# Patient Record
Sex: MALE | Race: BLACK OR AFRICAN AMERICAN | Employment: OTHER | URBAN - METROPOLITAN AREA
[De-identification: names, ages, dates, MRNs, and addresses within clinical notes are randomized per-mention and may not be internally consistent; named-entity substitution may affect disease eponyms.]

---

## 2022-11-27 ENCOUNTER — APPOINTMENT (OUTPATIENT)
Dept: CT IMAGING | Age: 77
DRG: 100 | End: 2022-11-27
Attending: EMERGENCY MEDICINE
Payer: MEDICARE

## 2022-11-27 ENCOUNTER — HOSPITAL ENCOUNTER (INPATIENT)
Age: 77
LOS: 2 days | Discharge: HOME HEALTH | DRG: 100 | End: 2022-11-29
Attending: EMERGENCY MEDICINE | Admitting: GENERAL ACUTE CARE HOSPITAL
Payer: MEDICARE

## 2022-11-27 ENCOUNTER — APPOINTMENT (OUTPATIENT)
Dept: GENERAL RADIOLOGY | Age: 77
DRG: 100 | End: 2022-11-27
Attending: EMERGENCY MEDICINE
Payer: MEDICARE

## 2022-11-27 DIAGNOSIS — G40.901 STATUS EPILEPTICUS (HCC): Primary | ICD-10-CM

## 2022-11-27 DIAGNOSIS — G40.919 BREAKTHROUGH SEIZURE (HCC): ICD-10-CM

## 2022-11-27 DIAGNOSIS — F03.C0 SEVERE DEMENTIA WITHOUT BEHAVIORAL DISTURBANCE, PSYCHOTIC DISTURBANCE, MOOD DISTURBANCE, OR ANXIETY, UNSPECIFIED DEMENTIA TYPE: ICD-10-CM

## 2022-11-27 DIAGNOSIS — I67.9 CEREBRAL VASCULAR DISEASE: ICD-10-CM

## 2022-11-27 LAB
ALBUMIN SERPL-MCNC: 2.7 G/DL (ref 3.5–5)
ALBUMIN/GLOB SERPL: 0.5 {RATIO} (ref 1.1–2.2)
ALP SERPL-CCNC: 156 U/L (ref 45–117)
ALT SERPL-CCNC: 17 U/L (ref 12–78)
ANION GAP SERPL CALC-SCNC: 9 MMOL/L (ref 5–15)
APPEARANCE UR: ABNORMAL
AST SERPL-CCNC: 9 U/L (ref 15–37)
BACTERIA URNS QL MICRO: ABNORMAL /HPF
BASE DEFICIT BLD-SCNC: 6 MMOL/L
BASOPHILS # BLD: 0.1 K/UL (ref 0–0.1)
BASOPHILS NFR BLD: 1 % (ref 0–1)
BILIRUB SERPL-MCNC: 0.6 MG/DL (ref 0.2–1)
BILIRUB UR QL: NEGATIVE
BUN SERPL-MCNC: 20 MG/DL (ref 6–20)
BUN/CREAT SERPL: 10 (ref 12–20)
CA-I BLD-MCNC: 1.28 MMOL/L (ref 1.12–1.32)
CALCIUM SERPL-MCNC: 9 MG/DL (ref 8.5–10.1)
CHLORIDE BLD-SCNC: 103 MMOL/L (ref 100–108)
CHLORIDE SERPL-SCNC: 103 MMOL/L (ref 97–108)
CO2 BLD-SCNC: 20 MMOL/L (ref 19–24)
CO2 SERPL-SCNC: 22 MMOL/L (ref 21–32)
COLOR UR: ABNORMAL
COMMENT, HOLDF: NORMAL
CREAT SERPL-MCNC: 2.07 MG/DL (ref 0.7–1.3)
CREAT UR-MCNC: 2.1 MG/DL (ref 0.6–1.3)
DIFFERENTIAL METHOD BLD: ABNORMAL
EOSINOPHIL # BLD: 0.4 K/UL (ref 0–0.4)
EOSINOPHIL NFR BLD: 5 % (ref 0–7)
EPITH CASTS URNS QL MICRO: ABNORMAL /LPF
ERYTHROCYTE [DISTWIDTH] IN BLOOD BY AUTOMATED COUNT: 14.7 % (ref 11.5–14.5)
GLOBULIN SER CALC-MCNC: 5.9 G/DL (ref 2–4)
GLUCOSE BLD STRIP.AUTO-MCNC: 91 MG/DL (ref 74–106)
GLUCOSE SERPL-MCNC: 93 MG/DL (ref 65–100)
GLUCOSE UR STRIP.AUTO-MCNC: NEGATIVE MG/DL
HCO3 BLDA-SCNC: 20 MMOL/L
HCT VFR BLD AUTO: 32.7 % (ref 36.6–50.3)
HGB BLD-MCNC: 10.6 G/DL (ref 12.1–17)
HGB UR QL STRIP: ABNORMAL
IMM GRANULOCYTES # BLD AUTO: 0 K/UL (ref 0–0.04)
IMM GRANULOCYTES NFR BLD AUTO: 0 % (ref 0–0.5)
KETONES UR QL STRIP.AUTO: ABNORMAL MG/DL
LACTATE BLD-SCNC: 0.89 MMOL/L (ref 0.4–2)
LACTATE BLD-SCNC: 10.5 MMOL/L (ref 0.4–2)
LEUKOCYTE ESTERASE UR QL STRIP.AUTO: ABNORMAL
LYMPHOCYTES # BLD: 2.2 K/UL (ref 0.8–3.5)
LYMPHOCYTES NFR BLD: 27 % (ref 12–49)
MCH RBC QN AUTO: 32.1 PG (ref 26–34)
MCHC RBC AUTO-ENTMCNC: 32.4 G/DL (ref 30–36.5)
MCV RBC AUTO: 99.1 FL (ref 80–99)
MONOCYTES # BLD: 0.6 K/UL (ref 0–1)
MONOCYTES NFR BLD: 7 % (ref 5–13)
NEUTS SEG # BLD: 4.9 K/UL (ref 1.8–8)
NEUTS SEG NFR BLD: 60 % (ref 32–75)
NITRITE UR QL STRIP.AUTO: POSITIVE
NRBC # BLD: 0 K/UL (ref 0–0.01)
NRBC BLD-RTO: 0 PER 100 WBC
PCO2 BLDV: 42.5 MMHG (ref 41–51)
PH BLDV: 7.29 [PH] (ref 7.32–7.42)
PH UR STRIP: 5.5 [PH] (ref 5–8)
PLATELET # BLD AUTO: 280 K/UL (ref 150–400)
PMV BLD AUTO: 11.4 FL (ref 8.9–12.9)
PO2 BLDV: 26 MMHG (ref 25–40)
POTASSIUM BLD-SCNC: 5.7 MMOL/L (ref 3.5–5.5)
POTASSIUM SERPL-SCNC: 5.1 MMOL/L (ref 3.5–5.1)
PROT SERPL-MCNC: 8.6 G/DL (ref 6.4–8.2)
PROT UR STRIP-MCNC: 100 MG/DL
RBC # BLD AUTO: 3.3 M/UL (ref 4.1–5.7)
RBC #/AREA URNS HPF: >100 /HPF (ref 0–5)
SAMPLES BEING HELD,HOLD: NORMAL
SERVICE CMNT-IMP: ABNORMAL
SODIUM BLD-SCNC: 137 MMOL/L (ref 136–145)
SODIUM SERPL-SCNC: 134 MMOL/L (ref 136–145)
SP GR UR REFRACTOMETRY: 1.02
SPECIMEN SITE: ABNORMAL
UROBILINOGEN UR QL STRIP.AUTO: 1 EU/DL (ref 0.2–1)
WBC # BLD AUTO: 8.1 K/UL (ref 4.1–11.1)
WBC URNS QL MICRO: >100 /HPF (ref 0–4)

## 2022-11-27 PROCEDURE — 74011250636 HC RX REV CODE- 250/636

## 2022-11-27 PROCEDURE — 70450 CT HEAD/BRAIN W/O DYE: CPT

## 2022-11-27 PROCEDURE — 93005 ELECTROCARDIOGRAM TRACING: CPT

## 2022-11-27 PROCEDURE — 85025 COMPLETE CBC W/AUTO DIFF WBC: CPT

## 2022-11-27 PROCEDURE — 74011000258 HC RX REV CODE- 258: Performed by: EMERGENCY MEDICINE

## 2022-11-27 PROCEDURE — 95706 EEG WO VID 2-12HR INTMT MNTR: CPT | Performed by: EMERGENCY MEDICINE

## 2022-11-27 PROCEDURE — 65270000046 HC RM TELEMETRY

## 2022-11-27 PROCEDURE — 83605 ASSAY OF LACTIC ACID: CPT

## 2022-11-27 PROCEDURE — 36415 COLL VENOUS BLD VENIPUNCTURE: CPT

## 2022-11-27 PROCEDURE — 74011250636 HC RX REV CODE- 250/636: Performed by: STUDENT IN AN ORGANIZED HEALTH CARE EDUCATION/TRAINING PROGRAM

## 2022-11-27 PROCEDURE — 71045 X-RAY EXAM CHEST 1 VIEW: CPT

## 2022-11-27 PROCEDURE — 96374 THER/PROPH/DIAG INJ IV PUSH: CPT

## 2022-11-27 PROCEDURE — 74011250636 HC RX REV CODE- 250/636: Performed by: EMERGENCY MEDICINE

## 2022-11-27 PROCEDURE — 80053 COMPREHEN METABOLIC PANEL: CPT

## 2022-11-27 PROCEDURE — 87040 BLOOD CULTURE FOR BACTERIA: CPT

## 2022-11-27 PROCEDURE — 81001 URINALYSIS AUTO W/SCOPE: CPT

## 2022-11-27 PROCEDURE — 74011000250 HC RX REV CODE- 250: Performed by: STUDENT IN AN ORGANIZED HEALTH CARE EDUCATION/TRAINING PROGRAM

## 2022-11-27 PROCEDURE — 82947 ASSAY GLUCOSE BLOOD QUANT: CPT

## 2022-11-27 PROCEDURE — 99285 EMERGENCY DEPT VISIT HI MDM: CPT

## 2022-11-27 PROCEDURE — C9113 INJ PANTOPRAZOLE SODIUM, VIA: HCPCS | Performed by: STUDENT IN AN ORGANIZED HEALTH CARE EDUCATION/TRAINING PROGRAM

## 2022-11-27 RX ORDER — LEVETIRACETAM 500 MG/5ML
1000 INJECTION, SOLUTION, CONCENTRATE INTRAVENOUS EVERY 12 HOURS
Status: DISCONTINUED | OUTPATIENT
Start: 2022-11-27 | End: 2022-11-27 | Stop reason: SDUPTHER

## 2022-11-27 RX ORDER — SODIUM CHLORIDE 9 MG/ML
75 INJECTION, SOLUTION INTRAVENOUS CONTINUOUS
Status: DISPENSED | OUTPATIENT
Start: 2022-11-27 | End: 2022-11-28

## 2022-11-27 RX ORDER — LORAZEPAM 2 MG/ML
INJECTION INTRAMUSCULAR
Status: COMPLETED
Start: 2022-11-27 | End: 2022-11-27

## 2022-11-27 RX ORDER — LEVETIRACETAM 500 MG/5ML
1000 INJECTION, SOLUTION, CONCENTRATE INTRAVENOUS EVERY 12 HOURS
Status: DISCONTINUED | OUTPATIENT
Start: 2022-11-28 | End: 2022-11-30 | Stop reason: HOSPADM

## 2022-11-27 RX ORDER — LEVETIRACETAM 500 MG/5ML
1000 INJECTION, SOLUTION, CONCENTRATE INTRAVENOUS
Status: COMPLETED | OUTPATIENT
Start: 2022-11-27 | End: 2022-11-27

## 2022-11-27 RX ORDER — IPRATROPIUM BROMIDE AND ALBUTEROL SULFATE 2.5; .5 MG/3ML; MG/3ML
3 SOLUTION RESPIRATORY (INHALATION)
Status: DISCONTINUED | OUTPATIENT
Start: 2022-11-27 | End: 2022-11-30 | Stop reason: HOSPADM

## 2022-11-27 RX ORDER — LORAZEPAM 2 MG/ML
2 CONCENTRATE ORAL
Status: DISCONTINUED | OUTPATIENT
Start: 2022-11-27 | End: 2022-11-30 | Stop reason: HOSPADM

## 2022-11-27 RX ORDER — MIDAZOLAM HYDROCHLORIDE 5 MG/ML
5 INJECTION INTRAMUSCULAR; INTRAVENOUS
Status: ACTIVE | OUTPATIENT
Start: 2022-11-27 | End: 2022-11-28

## 2022-11-27 RX ORDER — ACETAMINOPHEN 325 MG/1
650 TABLET ORAL
Status: DISCONTINUED | OUTPATIENT
Start: 2022-11-27 | End: 2022-11-30 | Stop reason: HOSPADM

## 2022-11-27 RX ORDER — MIDAZOLAM HYDROCHLORIDE 1 MG/ML
INJECTION, SOLUTION INTRAMUSCULAR; INTRAVENOUS
Status: DISCONTINUED
Start: 2022-11-27 | End: 2022-11-27 | Stop reason: WASHOUT

## 2022-11-27 RX ORDER — HYDRALAZINE HYDROCHLORIDE 20 MG/ML
10 INJECTION INTRAMUSCULAR; INTRAVENOUS
Status: DISCONTINUED | OUTPATIENT
Start: 2022-11-27 | End: 2022-11-30 | Stop reason: HOSPADM

## 2022-11-27 RX ORDER — MIDAZOLAM HYDROCHLORIDE 1 MG/ML
INJECTION, SOLUTION INTRAMUSCULAR; INTRAVENOUS
Status: COMPLETED
Start: 2022-11-27 | End: 2022-11-27

## 2022-11-27 RX ORDER — ONDANSETRON 2 MG/ML
4 INJECTION INTRAMUSCULAR; INTRAVENOUS
Status: DISCONTINUED | OUTPATIENT
Start: 2022-11-27 | End: 2022-11-30 | Stop reason: HOSPADM

## 2022-11-27 RX ADMIN — CEFTRIAXONE 1 G: 1 INJECTION, POWDER, FOR SOLUTION INTRAMUSCULAR; INTRAVENOUS at 17:00

## 2022-11-27 RX ADMIN — SODIUM CHLORIDE 75 ML/HR: 9 INJECTION, SOLUTION INTRAVENOUS at 21:03

## 2022-11-27 RX ADMIN — LORAZEPAM 2 MG: 2 INJECTION INTRAMUSCULAR; INTRAVENOUS at 14:09

## 2022-11-27 RX ADMIN — MIDAZOLAM 5 MG: 1 INJECTION INTRAMUSCULAR; INTRAVENOUS at 14:09

## 2022-11-27 RX ADMIN — SODIUM CHLORIDE 1000 ML: 9 INJECTION, SOLUTION INTRAVENOUS at 14:33

## 2022-11-27 RX ADMIN — SODIUM CHLORIDE 40 MG: 9 INJECTION, SOLUTION INTRAMUSCULAR; INTRAVENOUS; SUBCUTANEOUS at 21:27

## 2022-11-27 RX ADMIN — LEVETIRACETAM 1000 MG: 100 INJECTION INTRAVENOUS at 14:08

## 2022-11-27 NOTE — H&P
Hospitalist Admission Note    NAME: Maxx Pena   :  1945   MRN:  [id number]     Date/Time:  2022 5:17 PM    Patient PCP: No primary care provider on file.  ______________________________________________________________________  Given the patient's current clinical presentation, I have a high level of concern for decompensation if discharged from the emergency department. Complex decision making was performed, which includes reviewing the patient's available past medical records, laboratory results, and x-ray films. My assessment of this patient's clinical condition and my plan of care is as follows. Assessment / Plan:  Assessment:  -witnessed convulsive seizure  -Acute encephalopathy 2/2 above  -CKD  -Dehydration  -HX of seizure disorder, CVA with residual left-sided weakness, advanced dementia, HTN, HLD    Plan:    -Patient was admitted for further workup and treatment  -will admit to PCU  -Keppra 1000 mg b.i.d.  -Ativan p.r.n. For seizures  -p.r.n. Hydralazine for SBP greater than 170  -consulted Neurology appreciate recommendations  -Bedside rapid EEG completed by ICU negative for evidence of status epilepticus/seizures  -TSH/UA ordered  -seizure precautions  -frequent neuro checks  -aspiration precautions  -maintain euglycemia  -restart appropriate home medications  Code Status: Full code    DVT Prophylaxis: Heparin    -PT/OT/ST    -further recommendations pending clinical course    This note was dictated using dictation software. There may be some inadvertently misspelled words or grammatical errors. Subjective:   CHIEF COMPLAINT: seizure    HISTORY OF PRESENT ILLNESS:     Maxx Pena is a 76 y.o.  male who presents for seizures. Patient is bedbound at baseline secondary to a stroke in the past.  Wife at bedside. She reports that the patient was taken off Keppra 2 weeks ago and has ran out of Valproate.   The patient has not had seizures for years. The patient has not had seizures for years and therefore he was taken off Keppra per the wife by his PCP. Patient has recently moved down to this area from [place]. Patient with advanced dementia at baseline. No reported fever, chills, chest pain, shortness of breath, cough, abdominal pain, nausea, vomiting, diarrhea, dysuria, new focal neurological deficits, hearing changes, vision changes, changes to her normal bladder/bowel habits, lower extremity swelling, hematuria, hemoptysis, hematochezia, and melena. We were asked to admit for work up and evaluation of the above problems. Past Medical History:   Diagnosis Date    Cholesterolosis     Hypertension     Kidney disease     Seizure ([facility]     Stroke ([facility]         No past surgical history on file. Social History     Tobacco Use    Smoking status: Not on file    Smokeless tobacco: Not on file   Substance Use Topics    Alcohol use: Not on file        No family history on file. Not on File     Prior to Admission medications    Not on File       REVIEW OF SYSTEMS:     I am not able to complete the review of systems because: The patient is intubated and sedated    The patient has altered mental status due to his acute medical problems    The patient has baseline aphasia from prior stroke(s)    The patient has baseline dementia and is not reliable historian    The patient is in acute medical distress and unable to provide information           Total of 12 systems reviewed as follows:       POSITIVE= underlined text  Negative = text not     Review of system limited secondary to the patient's altered mental status    Objective:   VITALS:    Visit Vitals  BP 92/78   Pulse 91   Temp 98.6 °F (37 °C)   Resp 18   Ht 5' 7\" (1.702 m)   Wt 73.9 kg (163 lb)   SpO2 96%   BMI 25.53 kg/m²       PHYSICAL EXAM:    General:    Altered, no distress, appears stated age.      HEENT: Atraumatic, anicteric sclerae, pink conjunctivae     No oral ulcers, mucosa moist, throat clear, dentition fair  Neck:  Supple, symmetrical,  thyroid: non tender  Lungs:   Clear to auscultation bilaterally. No Wheezing or Rhonchi. No rales. Chest wall:  No tenderness  No Accessory muscle use. Heart:   Regular  rhythm,  No  murmur   No edema  Abdomen:   Soft, non-tender. Not distended. Bowel sounds normal  Extremities: No cyanosis. No clubbing,      Skin turgor normal, Capillary refill normal, Radial dial pulse 2+  Skin:     Not pale. Not Jaundiced  No rashes   Psych:  Unable to assess  Neurologic: No new focal neurodeficit. Left-sided weakness/baseline. Patient is altered at this time secondary to administered sedatives  _______________________________________________________________________  Care Plan discussed with:    Comments   Patient     Family      RN     Care Manager                    Consultant:      _______________________________________________________________________  Expected  Disposition:   Home with Family    HH/PT/OT/RN    SNF/LTC    KIRSTEN    ________________________________________________________________________  TOTAL TIME:  39 Minutes    Critical Care Provided     Minutes non procedure based      Comments     Reviewed previous records   >50% of visit spent in counseling and coordination of care  Discussion with patient and/or family and questions answered       ________________________________________________________________________  Signed: Robert Kaufman DO    Procedures: see electronic medical records for all procedures/Xrays and details which were not copied into this note but were reviewed prior to creation of Plan.     LAB DATA REVIEWED:    Recent Results (from the past 24 hour(s))   BLOOD GAS,CHEM8,LACTIC ACID POC    Collection Time: 11/27/22  2:16 PM   Result Value Ref Range    Calcium, ionized (POC) 1.28 1.12 - 1.32 mmol/L    BICARBONATE 20 mmol/L    Base deficit (POC) 6.0 mmol/L    Sample source VENOUS BLOOD      CO2, POC 20 19 - 24 MMOL/L    Sodium, POC 137 136 - 145 MMOL/L    Potassium, POC 5.7 (H) 3.5 - 5.5 MMOL/L    Chloride,  100 - 108 MMOL/L    Glucose, POC 91 74 - 106 MG/DL    Creatinine, POC 2.1 (H) 0.6 - 1.3 MG/DL    Lactic Acid (POC) 10.50 (HH) 0.40 - 2.00 mmol/L    Critical value read back FAMILIA     pH, venous (POC) 7.29 (L) 7.32 - 7.42      pCO2, venous (POC) 42.5 41 - 51 MMHG    pO2, venous (POC) 26 25 - 40 mmHg   CBC WITH AUTOMATED DIFF    Collection Time: 11/27/22  2:18 PM   Result Value Ref Range    WBC 8.1 4.1 - 11.1 K/uL    RBC 3.30 (L) 4.10 - 5.70 M/uL    HGB 10.6 (L) 12.1 - 17.0 g/dL    HCT 32.7 (L) 36.6 - 50.3 %    MCV 99.1 (H) 80.0 - 99.0 FL    MCH 32.1 26.0 - 34.0 PG    MCHC 32.4 30.0 - 36.5 g/dL    RDW 14.7 (H) 11.5 - 14.5 %    PLATELET 654 663 - 665 K/uL    MPV 11.4 8.9 - 12.9 FL    NRBC 0.0 0  WBC    ABSOLUTE NRBC 0.00 0.00 - 0.01 K/uL    NEUTROPHILS 60 32 - 75 %    LYMPHOCYTES 27 12 - 49 %    MONOCYTES 7 5 - 13 %    EOSINOPHILS 5 0 - 7 %    BASOPHILS 1 0 - 1 %    IMMATURE GRANULOCYTES 0 0.0 - 0.5 %    ABS. NEUTROPHILS 4.9 1.8 - 8.0 K/UL    ABS. LYMPHOCYTES 2.2 0.8 - 3.5 K/UL    ABS. MONOCYTES 0.6 0.0 - 1.0 K/UL    ABS. EOSINOPHILS 0.4 0.0 - 0.4 K/UL    ABS. BASOPHILS 0.1 0.0 - 0.1 K/UL    ABS. IMM. GRANS. 0.0 0.00 - 0.04 K/UL    DF AUTOMATED     METABOLIC PANEL, COMPREHENSIVE    Collection Time: 11/27/22  2:18 PM   Result Value Ref Range    Sodium 134 (L) 136 - 145 mmol/L    Potassium 5.1 3.5 - 5.1 mmol/L    Chloride 103 97 - 108 mmol/L    CO2 22 21 - 32 mmol/L    Anion gap 9 5 - 15 mmol/L    Glucose 93 65 - 100 mg/dL    BUN 20 6 - 20 MG/DL    Creatinine 2.07 (H) 0.70 - 1.30 MG/DL    BUN/Creatinine ratio 10 (L) 12 - 20      eGFR 32 (L) >60 ml/min/1.73m2    Calcium 9.0 8.5 - 10.1 MG/DL    Bilirubin, total 0.6 0.2 - 1.0 MG/DL    ALT (SGPT) 17 12 - 78 U/L    AST (SGOT) 9 (L) 15 - 37 U/L    Alk.  phosphatase 156 (H) 45 - 117 U/L    Protein, total 8.6 (H) 6.4 - 8.2 g/dL    Albumin 2.7 (L) 3.5 - 5.0 g/dL    Globulin 5.9 (H) 2.0 - 4.0 g/dL    A-G Ratio 0.5 (L) 1.1 - 2.2     SAMPLES BEING HELD    Collection Time: 11/27/22  2:18 PM   Result Value Ref Range    SAMPLES BEING HELD 1SST 1BLU 1GREEN 2RED     COMMENT        Add-on orders for these samples will be processed based on acceptable specimen integrity and analyte stability, which may vary by analyte.    URINALYSIS W/ RFLX MICROSCOPIC    Collection Time: 11/27/22  2:44 PM   Result Value Ref Range    Color YELLOW/STRAW      Appearance CLOUDY (A) CLEAR      Specific gravity 1.016      pH (UA) 5.5 5.0 - 8.0      Protein 100 (A) NEG mg/dL    Glucose Negative NEG mg/dL    Ketone TRACE (A) NEG mg/dL    Bilirubin Negative NEG      Blood LARGE (A) NEG      Urobilinogen 1.0 0.2 - 1.0 EU/dL    Nitrites Positive (A) NEG      Leukocyte Esterase LARGE (A) NEG      WBC >100 (H) 0 - 4 /hpf    RBC >100 (H) 0 - 5 /hpf    Epithelial cells FEW FEW /lpf    Bacteria 4+ (A) NEG /hpf   POC LACTIC ACID    Collection Time: 11/27/22  4:52 PM   Result Value Ref Range    Lactic Acid (POC) 0.89 0.40 - 2.00 mmol/L

## 2022-11-27 NOTE — CONSULTS
ICU Consult    Name: Eav Miller MRN: [id number]   : 1945 Hospital: [facility]   Date: 2022        IMPRESSION:     Uncontrolled seizures due to recent lack of antiseizure meds     Patient Active Problem List   Diagnosis Code    [facility] G40.919        RECOMMENDATIONS:   At this time, patient is protecting his airway and is hemodynamically stable, o2 sats are 100% on room air and is not actively seizing   Received loading dose of keppra  Will need more fluids as he is appearing dehydrated     Subjective: This patient has been seen and evaluated at the request of Dr. Gerardo Rose for ICU evaluation. Patient is a 76 y.o. male with h/o seizure disorder is on depakote and valproic acid at home for seizures, bedbound due to stroke in the past, fed pureed diet by his wife, was brought to ER with seizures  Patient's son and wife at bedside, He has missed his anti seizure meds as he just moved here from [place] and could no get the prescription filled  No fever or vomiting    No past medical history on file. No past surgical history on file. Prior to Admission medications    Not on File     Not on File   Social History     Tobacco Use    Smoking status: Not on file    Smokeless tobacco: Not on file   Substance Use Topics    Alcohol use: Not on file      No family history on file. Current Facility-Administered Medications   Medication Dose Route Frequency    midazolam (VERSED) injection 5 mg  5 mg IntraMUSCular NOW    sodium chloride 0.9 % bolus infusion 1,000 mL  1,000 mL IntraVENous ONCE    cefTRIAXone (ROCEPHIN) 1 g in 0.9% sodium chloride (MBP/ADV) 50 mL MBP  1 g IntraVENous Q24H       Review of Systems:  Pertinent items are noted in HPI.   ROS : unable to obtain    Objective:   Vital Signs:    Visit Vitals  BP 92/78   Pulse 91   Temp 98.6 °F (37 °C)   Resp 18   Ht 5' 7\" (1.702 m)   Wt 73.9 kg (163 lb)   SpO2 96%   BMI 25.53 kg/m²       O2 Device: None (Room air) Temp (24hrs), Av.6 °F (37 °C), Min:98.6 °F (37 °C), Max:98.6 °F (37 °C)       Intake/Output:   Last shift:      No intake/output data recorded. Last 3 shifts: No intake/output data recorded. No intake or output data in the 24 hours ending 22 1658     Physical Exam:   General:  Thin built, chronically ill appearing,emaciated   Head:  Normocephalic, without obvious abnormality, atraumatic. Eyes:  Conjunctivae/corneas clear. ANicteric   Nose: Nares normal. Mucosa normal. No drainage or sinus tenderness. Throat: Lips, mucosa dry. Neck: Supple, symmetrical, trachea midline, no adenopathy, thyroid: no enlargment/tenderness/nodules, no carotid bruit and no JVD. No crepitus   Back:   Symmetric, no curvature, no spine tenderness or flank pain   Lungs:   Bilateral auscultation - clear to auscultaion   Chest wall:  No tenderness or deformity. Heart:  Regular rate and rhythm, S1, S2 normal, no murmur, click, rub or gallop. Abdomen:   Soft, non-tender. Bowel sounds normal. No masses,  No organomegaly. No paradox   Extremities: normal, atraumatic, no cyanosis or edema. contracted   Pulses: 1-2+ and symmetric all extremities.    Skin: Skin color, texture, turgor normal. No rashes or lesions   Lymph nodes: Cervical, supraclavicular, and axillary nodes normal.   Neurologic: Drowsy, cannot perform proper neurologic exam, contracted          Data review:   Labs:  Recent Results (from the past 24 hour(s))   BLOOD GAS,CHEM8,LACTIC ACID POC    Collection Time: 22  2:16 PM   Result Value Ref Range    Calcium, ionized (POC) 1.28 1.12 - 1.32 mmol/L    BICARBONATE 20 mmol/L    Base deficit (POC) 6.0 mmol/L    Sample source VENOUS BLOOD      CO2, POC 20 19 - 24 MMOL/L    Sodium,  136 - 145 MMOL/L    Potassium, POC 5.7 (H) 3.5 - 5.5 MMOL/L    Chloride,  100 - 108 MMOL/L    Glucose, POC 91 74 - 106 MG/DL    Creatinine, POC 2.1 (H) 0.6 - 1.3 MG/DL    Lactic Acid (POC) 10.50 (HH) 0.40 - 2.00 mmol/L Critical value read back FAMILIA     pH, venous (POC) 7.29 (L) 7.32 - 7.42      pCO2, venous (POC) 42.5 41 - 51 MMHG    pO2, venous (POC) 26 25 - 40 mmHg   CBC WITH AUTOMATED DIFF    Collection Time: 11/27/22  2:18 PM   Result Value Ref Range    WBC 8.1 4.1 - 11.1 K/uL    RBC 3.30 (L) 4.10 - 5.70 M/uL    HGB 10.6 (L) 12.1 - 17.0 g/dL    HCT 32.7 (L) 36.6 - 50.3 %    MCV 99.1 (H) 80.0 - 99.0 FL    MCH 32.1 26.0 - 34.0 PG    MCHC 32.4 30.0 - 36.5 g/dL    RDW 14.7 (H) 11.5 - 14.5 %    PLATELET 045 490 - 671 K/uL    MPV 11.4 8.9 - 12.9 FL    NRBC 0.0 0  WBC    ABSOLUTE NRBC 0.00 0.00 - 0.01 K/uL    NEUTROPHILS 60 32 - 75 %    LYMPHOCYTES 27 12 - 49 %    MONOCYTES 7 5 - 13 %    EOSINOPHILS 5 0 - 7 %    BASOPHILS 1 0 - 1 %    IMMATURE GRANULOCYTES 0 0.0 - 0.5 %    ABS. NEUTROPHILS 4.9 1.8 - 8.0 K/UL    ABS. LYMPHOCYTES 2.2 0.8 - 3.5 K/UL    ABS. MONOCYTES 0.6 0.0 - 1.0 K/UL    ABS. EOSINOPHILS 0.4 0.0 - 0.4 K/UL    ABS. BASOPHILS 0.1 0.0 - 0.1 K/UL    ABS. IMM. GRANS. 0.0 0.00 - 0.04 K/UL    DF AUTOMATED     METABOLIC PANEL, COMPREHENSIVE    Collection Time: 11/27/22  2:18 PM   Result Value Ref Range    Sodium 134 (L) 136 - 145 mmol/L    Potassium 5.1 3.5 - 5.1 mmol/L    Chloride 103 97 - 108 mmol/L    CO2 22 21 - 32 mmol/L    Anion gap 9 5 - 15 mmol/L    Glucose 93 65 - 100 mg/dL    BUN 20 6 - 20 MG/DL    Creatinine 2.07 (H) 0.70 - 1.30 MG/DL    BUN/Creatinine ratio 10 (L) 12 - 20      eGFR 32 (L) >60 ml/min/1.73m2    Calcium 9.0 8.5 - 10.1 MG/DL    Bilirubin, total 0.6 0.2 - 1.0 MG/DL    ALT (SGPT) 17 12 - 78 U/L    AST (SGOT) 9 (L) 15 - 37 U/L    Alk.  phosphatase 156 (H) 45 - 117 U/L    Protein, total 8.6 (H) 6.4 - 8.2 g/dL    Albumin 2.7 (L) 3.5 - 5.0 g/dL    Globulin 5.9 (H) 2.0 - 4.0 g/dL    A-G Ratio 0.5 (L) 1.1 - 2.2     SAMPLES BEING HELD    Collection Time: 11/27/22  2:18 PM   Result Value Ref Range    SAMPLES BEING HELD 1SST 1BLU 1GREEN 2RED     COMMENT        Add-on orders for these samples will be processed based on acceptable specimen integrity and analyte stability, which may vary by analyte. URINALYSIS W/ RFLX MICROSCOPIC    Collection Time: 11/27/22  2:44 PM   Result Value Ref Range    Color YELLOW/STRAW      Appearance CLOUDY (A) CLEAR      Specific gravity 1.016      pH (UA) 5.5 5.0 - 8.0      Protein 100 (A) NEG mg/dL    Glucose Negative NEG mg/dL    Ketone TRACE (A) NEG mg/dL    Bilirubin Negative NEG      Blood LARGE (A) NEG      Urobilinogen 1.0 0.2 - 1.0 EU/dL    Nitrites Positive (A) NEG      Leukocyte Esterase LARGE (A) NEG      WBC >100 (H) 0 - 4 /hpf    RBC >100 (H) 0 - 5 /hpf    Epithelial cells FEW FEW /lpf    Bacteria 4+ (A) NEG /hpf       PFT Results  (Last 48 hours)      None              Imaging:  I have personally reviewed the patients radiographs and have reviewed the reports:  XR Results (most recent):  Results from Hospital Encounter encounter on 11/27/22    XR CHEST PORT    Narrative  EXAM:  XR CHEST PORT    INDICATION: Status epilepticus. COMPARISON: none    TECHNIQUE: Upright portable chest AP view    FINDINGS: Cardiac monitoring leads are noted. The cardiac silhouette is within  normal limits. The pulmonary vasculature is within normal limits. Atherosclerotic calcifications affect the aortic arch and the thoracic aorta is  tortuous. The lungs and pleural spaces are clear. The visualized bones and upper abdomen  are age-appropriate. Impression  No acute findings. CT Results (most recent):  Results from Hospital Encounter encounter on 11/27/22    CT HEAD WO CONT    Narrative  EXAM: Brain CT without contrast.    INDICATION: status epilepticus    TECHNIQUE: Noncontrast CT of the brain is performed with 5 mm collimation. Bone  algorithm axial and sagittal and coronal reconstructions performed and  evaluated.   CT dose reduction was achieved through use of a standardized  protocol tailored for this examination and automatic exposure control for dose  modulation. COMPARISON: None    FINDINGS: There is no acute intracranial hemorrhage, mass, mass effect or  herniation. Ventricles and sulci show proportionate and symmetric prominence. There is periventricular white matter hypodensity. The gray-white matter  differentiation is well-preserved. Atherosclerotic calcifications are seen  within the carotid siphons and vertebral arteries. The mastoid air cells are  well pneumatized. The visualized paranasal sinuses are normal.    Impression  No acute intracranial hemorrhage, mass or infarct. Cerebral atrophy  and white matter change most compatible with chronic small vessel ischemic  and/or senescent change. Intracranial atherosclerosis. High complexity decision making was performed during the evaluation of this patient at high risk for decompensation with multiple organ involvement     Above mentioned total time spent on reviewing the case/medical record/data/notes/EMR/patient examination/documentation/coordinating care with nurse/consultants, exclusive of procedures with complex decision making performed and > 50% time spent in face to face evaluation.      Jourdan Bryant MD

## 2022-11-27 NOTE — ED NOTES
EMERGENCY DEPARTMENT HISTORY AND PHYSICAL EXAM      MRM 2 PROGRESSIVE CARE: EMERGENCY DEPARTMENT ENCOUNTER     Pt Name: Prince Alfaro MRN: [id number]  Birthdate 1945  Date of evaluation: 11/27/2022  Provider: Dr. Tenorio People   Attending: Jose Rafael Larkin MD   PCP: Nate Carmen MD  Note Started: 2:33 PM     [address]   Chief Complaint   Patient presents with    Seizure     Pt was having seizure activity        History Provided By: Patient    HISTORY OF PRESENT ILLNESS  (Location, Timing/Onset, Context/Setting, Quality, Duration, Modifying Factors, Severity, Associated Signs and Symptoms) Note limiting factors. Chief Complaint: seizure   Prince Alfaro is a 76 y.o. male with PMH CVA with residual left sided weakness, HTN, seizures, type II DM who presents by EMS with convulsions and unresponsiveness. EMS reports he has had 3 prior episodes today, reported to last 1-2 minutes. States current episode has been ongoing for 45 minutes. Patient is breathing on his own. Family reports history of seizures for which patient has been on Keppra and Depakote outpatient, but these medications were discontinued two weeks ago by his PCP. Patient was recently in a rehab facility in [place] and recently came to the area. Has baseline dementia and history of CVA. He is non-ambulatory. Wife reports he has not had a seizure in many years. Nursing Notes were all reviewed and agreed with or any disagreements were addressed in the HPI. REVIEW OF SYSTEMS   (2-9 systems for level 4, 10 or more for level 5)  Review of Systems   Constitutional:  Negative for chills and fever. HENT:  Negative for drooling. Eyes:  Negative for discharge and visual disturbance. Gastrointestinal:  Negative for vomiting. Musculoskeletal:  Positive for gait problem. Neurological:  Positive for tremors and seizures. Negative for syncope. Positives and Pertinent negatives as per HPI.  Except as noted above in the ROS, all other systems were reviewed and negative. PAST MEDICAL HISTORY  Past Medical History:   Diagnosis Date    Cholesterolosis     Hypertension     Kidney disease     Seizure ([facility] [address]     Stroke [facility])        SURGICAL HISTORY  No past surgical history on file. CURRENTMEDICATIONS   There are no discharge medications for this patient. ALLERGIES   Patient has no known allergies. FAMILYHISTORY   No family history on file. SOCIAL HISTORY        PHYSICAL EXAM   (up to 7 for level 4, 8 or more for level 5)  Patient Vitals for the past 12 hrs:   Temp Pulse Resp BP SpO2   11/27/22 2107 97.9 °F (36.6 °C) 83 13 (!) 154/81 100 %   11/27/22 2015 -- 75 11 (!) 170/92 --   11/27/22 1830 -- 81 14 (!) 174/94 --   11/27/22 1800 -- 74 12 (!) 175/86 --   11/27/22 1724 -- 78 16 (!) 167/88 100 %   11/27/22 1447 -- 91 -- -- --   11/27/22 1446 98.6 °F (37 °C) -- 18 92/78 96 %      Physical Exam  HENT:      Head: Normocephalic and atraumatic. Cardiovascular:      Rate and Rhythm: Normal rate. Pulmonary:      Effort: Pulmonary effort is normal.   Skin:     General: Skin is warm and dry. Neurological:      Mental Status: He is disoriented and unresponsive. Motor: Tremor and seizure activity present.         DIAGNOSTIC RESULTS  LABS:  Recent Results (from the past 12 hour(s))   BLOOD GAS,CHEM8,LACTIC ACID POC    Collection Time: 11/27/22  2:16 PM   Result Value Ref Range    Calcium, ionized (POC) 1.28 1.12 - 1.32 mmol/L    BICARBONATE 20 mmol/L    Base deficit (POC) 6.0 mmol/L    Sample source VENOUS BLOOD      CO2, POC 20 19 - 24 MMOL/L    Sodium,  136 - 145 MMOL/L    Potassium, POC 5.7 (H) 3.5 - 5.5 MMOL/L    Chloride,  100 - 108 MMOL/L    Glucose, POC 91 74 - 106 MG/DL    Creatinine, POC 2.1 (H) 0.6 - 1.3 MG/DL    Lactic Acid (POC) 10.50 (HH) 0.40 - 2.00 mmol/L    Critical value read back FAMILIA     pH, venous (POC) 7.29 (L) 7.32 - 7.42      pCO2, venous (POC) 42.5 41 - 51 MMHG    pO2, venous (POC) 26 25 - 40 mmHg   CBC WITH AUTOMATED DIFF    Collection Time: 11/27/22  2:18 PM   Result Value Ref Range    WBC 8.1 4.1 - 11.1 K/uL    RBC 3.30 (L) 4.10 - 5.70 M/uL    HGB 10.6 (L) 12.1 - 17.0 g/dL    HCT 32.7 (L) 36.6 - 50.3 %    MCV 99.1 (H) 80.0 - 99.0 FL    MCH 32.1 26.0 - 34.0 PG    MCHC 32.4 30.0 - 36.5 g/dL    RDW 14.7 (H) 11.5 - 14.5 %    PLATELET 503 114 - 633 K/uL    MPV 11.4 8.9 - 12.9 FL    NRBC 0.0 0  WBC    ABSOLUTE NRBC 0.00 0.00 - 0.01 K/uL    NEUTROPHILS 60 32 - 75 %    LYMPHOCYTES 27 12 - 49 %    MONOCYTES 7 5 - 13 %    EOSINOPHILS 5 0 - 7 %    BASOPHILS 1 0 - 1 %    IMMATURE GRANULOCYTES 0 0.0 - 0.5 %    ABS. NEUTROPHILS 4.9 1.8 - 8.0 K/UL    ABS. LYMPHOCYTES 2.2 0.8 - 3.5 K/UL    ABS. MONOCYTES 0.6 0.0 - 1.0 K/UL    ABS. EOSINOPHILS 0.4 0.0 - 0.4 K/UL    ABS. BASOPHILS 0.1 0.0 - 0.1 K/UL    ABS. IMM. GRANS. 0.0 0.00 - 0.04 K/UL    DF AUTOMATED     METABOLIC PANEL, COMPREHENSIVE    Collection Time: 11/27/22  2:18 PM   Result Value Ref Range    Sodium 134 (L) 136 - 145 mmol/L    Potassium 5.1 3.5 - 5.1 mmol/L    Chloride 103 97 - 108 mmol/L    CO2 22 21 - 32 mmol/L    Anion gap 9 5 - 15 mmol/L    Glucose 93 65 - 100 mg/dL    BUN 20 6 - 20 MG/DL    Creatinine 2.07 (H) 0.70 - 1.30 MG/DL    BUN/Creatinine ratio 10 (L) 12 - 20      eGFR 32 (L) >60 ml/min/1.73m2    Calcium 9.0 8.5 - 10.1 MG/DL    Bilirubin, total 0.6 0.2 - 1.0 MG/DL    ALT (SGPT) 17 12 - 78 U/L    AST (SGOT) 9 (L) 15 - 37 U/L    Alk. phosphatase 156 (H) 45 - 117 U/L    Protein, total 8.6 (H) 6.4 - 8.2 g/dL    Albumin 2.7 (L) 3.5 - 5.0 g/dL    Globulin 5.9 (H) 2.0 - 4.0 g/dL    A-G Ratio 0.5 (L) 1.1 - 2.2     SAMPLES BEING HELD    Collection Time: 11/27/22  2:18 PM   Result Value Ref Range    SAMPLES BEING HELD 1SST 1BLU 1GREEN 2RED     COMMENT        Add-on orders for these samples will be processed based on acceptable specimen integrity and analyte stability, which may vary by analyte.    URINALYSIS W/ RFLX MICROSCOPIC    Collection Time: 11/27/22  2:44 PM   Result Value Ref Range    Color YELLOW/STRAW      Appearance CLOUDY (A) CLEAR      Specific gravity 1.016      pH (UA) 5.5 5.0 - 8.0      Protein 100 (A) NEG mg/dL    Glucose Negative NEG mg/dL    Ketone TRACE (A) NEG mg/dL    Bilirubin Negative NEG      Blood LARGE (A) NEG      Urobilinogen 1.0 0.2 - 1.0 EU/dL    Nitrites Positive (A) NEG      Leukocyte Esterase LARGE (A) NEG      WBC >100 (H) 0 - 4 /hpf    RBC >100 (H) 0 - 5 /hpf    Epithelial cells FEW FEW /lpf    Bacteria 4+ (A) NEG /hpf   POC LACTIC ACID    Collection Time: 11/27/22  4:52 PM   Result Value Ref Range    Lactic Acid (POC) 0.89 0.40 - 2.00 mmol/L        EKG: When ordered, EKG's are interpreted by the Emergency Department Physician in the absence of a cardiologist.  Please see their note for interpretation of EKG. RADIOLOGY: All images such as plain films, CT, Ultrasound and MRI are read by the radiologist. Interpretation per the Radiologist below, if available at the time of this note:  CT HEAD WO CONT    Result Date: 11/27/2022  EXAM: Brain CT without contrast. INDICATION: status epilepticus TECHNIQUE: Noncontrast CT of the brain is performed with 5 mm collimation. Bone algorithm axial and sagittal and coronal reconstructions performed and evaluated. CT dose reduction was achieved through use of a standardized protocol tailored for this examination and automatic exposure control for dose modulation. COMPARISON: None FINDINGS: There is no acute intracranial hemorrhage, mass, mass effect or herniation. Ventricles and sulci show proportionate and symmetric prominence. There is periventricular white matter hypodensity. The gray-white matter differentiation is well-preserved. Atherosclerotic calcifications are seen within the carotid siphons and vertebral arteries. The mastoid air cells are well pneumatized.  The visualized paranasal sinuses are normal.     No acute intracranial hemorrhage, mass or infarct. Cerebral atrophy and white matter change most compatible with chronic small vessel ischemic and/or senescent change. Intracranial atherosclerosis. XR CHEST PORT    Result Date: 11/27/2022  EXAM:  XR CHEST PORT INDICATION: Status epilepticus. COMPARISON: none TECHNIQUE: Upright portable chest AP view FINDINGS: Cardiac monitoring leads are noted. The cardiac silhouette is within normal limits. The pulmonary vasculature is within normal limits. Atherosclerotic calcifications affect the aortic arch and the thoracic aorta is tortuous. The lungs and pleural spaces are clear. The visualized bones and upper abdomen are age-appropriate. No acute findings. PROCEDURES - Unless otherwise noted below, none  Procedures    CRITICAL CARE TIME: I have spent 35 minutes of critical care time in evaluating and treating this patient. This includes time spent at bedside, time with family and decision makers, documentation, review of labs and imaging, and/or consultation with specialists. It does not include time spent on separately billed procedures. This patient presents with a critical illness or injury that acutely impairs one or more vital organ systems such that there is a high probability of imminent or life threatening deterioration in the patient's condition. This case involved decision making of high complexity to assess, manipulate, and support vital organ system failure and/or to prevent further life threatening deterioration of the patient's condition. Failure to initiate these interventions on an urgent basis would likely result in sudden, clinically significant or life threatening deterioration in the patient's condition.     Abnormal findings supporting critical care: Status epilepticus  Interventions to support critical care: Multiple rounds of IV antiepileptic drugs  Failure to intervene may result in: Neurologic deterioration      CONSULTS:  IP CONSULT TO HOSPITALIST  IP CONSULT TO NEUROLOGY    DIFFERENTIAL DIAGNOSIS/MDM:  I reviewed the vital signs, available nursing notes, past medical history, past surgical history, family history and social history. Records Reviewed: Nursing Notes    Provider Notes (Medical Decision Making):   Patient presents to ED in status epilepticus. Received ativan, versed, keppra with resolution of seizure after approx. 10 min, total seizure time approx 1 hr. Lactic acid elevated to 10.5 initially. Improved to 0.89. CXR negative. CT head No acute intracranial hemorrhage, mass or infarct. Cerebral atrophy and white matter change most compatible with chronic small vessel ischemic and/or senescent changes. UTI on UA. Patient started on Rocephin. Admitted to medicine for further evaluation and management. EMERGENCY DEPARTMENT COURSE:   I am the first provider for this patient. Initial assessment performed. The patients presenting problems have been discussed, and they are in agreement with the care plan formulated and outlined with them. I have encouraged them to ask questions as they arise throughout their visit. ED Course as of 11/27/22 2225   Sun Nov 27, 2022   1422 I was notified by nursing of patient's critical lactate of 10.4. I will relay this to physician of record, Dr. Dariana Brito. I believe this is secondary to ongoing seizure activity, not sepsis. [MB]   1649 Patient's lactic acidosis is due to seizures and not to sepsis.  [CC]   3799 Patient seen by the intensive care provider [CC]   0827 Violent Restraint Face-to-Face Evaluation  (must be completed within one hour of initiation of restraints)      Evaluate immediate situation: Patient pulling off EEG device and pulling off lines    Reaction to intervention: Unable to cooperate with verbal or tactile stimuli    Medical Condition/Assessment: Status epilepticus    Behavioral Condition/Assessment: Status epilepticus, acute cephalopathy    The patient's review of systems, history, medications, and recent labs were reviewed at this time.      Continue/Discontinue restraints at this time: Continue    One-Hour Review Evaluation Physician Notification:    Provider Notified (Name): Dr. Murphy Bucio     date 2022     time [address]    Physician or Designated One-Hour Reviewer: Vania Pierce MD                      [CC]      ED Course User Index  [CC] Cheral Simmonds, MD  [MB] Juanpablo Lord MD       Vitals:   Patient Vitals for the past 12 hrs:   Temp Pulse Resp BP SpO2   22 97.9 °F (36.6 °C) 83 13 (!) 154/81 100 %   22 2015 -- 75 11 (!) 170/92 --   22 1830 -- 81 14 (!) 174/94 --   22 1800 -- 74 12 (!) 175/86 --   22 1724 -- 78 16 (!) 167/88 100 %   22 1447 -- 91 -- -- --   22 1446 98.6 °F (37 °C) -- 18 92/78 96 %        Patient was given the following medications:  Medications   midazolam (VERSED) injection 5 mg (has no administration in time range)   cefTRIAXone (ROCEPHIN) 1 g in 0.9% sodium chloride (MBP/ADV) 50 mL MBP (1 g IntraVENous New Bag 22)   acetaminophen (TYLENOL) tablet 650 mg (has no administration in time range)   ondansetron (ZOFRAN) injection 4 mg (has no administration in time range)   pantoprazole (PROTONIX) 40 mg in 0.9% sodium chloride 10 mL injection (40 mg IntraVENous Given 22)   albuterol-ipratropium (DUO-NEB) 2.5 MG-0.5 MG/3 ML (has no administration in time range)   hydrALAZINE (APRESOLINE) 20 mg/mL injection 10 mg (has no administration in time range)   LORazepam (INTENSOL) 2 mg/mL oral concentrate 2 mg (has no administration in time range)   0.9% sodium chloride infusion (75 mL/hr IntraVENous New Bag 22)   levETIRAcetam (KEPPRA) injection 1,000 mg (has no administration in time range)   levETIRAcetam (KEPPRA) injection 1,000 mg (1,000 mg IntraVENous Given 22 1408)   LORazepam (ATIVAN) 2 mg/mL injection (2 mg  Given 22 1409)   midazolam (VERSED) 1 mg/mL injection (5 mg  Given 22 1409)   sodium chloride 0.9 % bolus infusion 1,000 mL (1,000 mL IntraVENous New Bag 11/27/22 1433)       Admit Note:  Pt is being admitted by Dr. Digna Young. The results of their tests and reason(s) for their admission have been discussed with pt and/or available family. They convey agreement and understanding for the need to be admitted and for admission diagnosis. FINAL IMPRESSION  1. Status epilepticus ([address]           Pt seen and note written by Ember OSMAN, in conjunction with this provider. Dr. Mark Perdue         Please note that this dictation was completed with [employer], the computer voice recognition software. Quite often unanticipated grammatical, syntax, homophones, and other interpretive errors are inadvertently transcribed by the computer software. Please disregards these errors. Please excuse any errors that have escaped final proofreading.

## 2022-11-28 ENCOUNTER — APPOINTMENT (OUTPATIENT)
Dept: MRI IMAGING | Age: 77
DRG: 100 | End: 2022-11-28
Attending: PSYCHIATRY & NEUROLOGY
Payer: MEDICARE

## 2022-11-28 LAB
ALBUMIN SERPL-MCNC: 2.3 G/DL (ref 3.5–5)
ALBUMIN/GLOB SERPL: 0.5 {RATIO} (ref 1.1–2.2)
ALP SERPL-CCNC: 131 U/L (ref 45–117)
ALT SERPL-CCNC: 12 U/L (ref 12–78)
ANION GAP SERPL CALC-SCNC: 5 MMOL/L (ref 5–15)
AST SERPL-CCNC: 9 U/L (ref 15–37)
ATRIAL RATE: 112 BPM
ATRIAL RATE: 70 BPM
BASOPHILS # BLD: 0.1 K/UL (ref 0–0.1)
BASOPHILS NFR BLD: 1 % (ref 0–1)
BILIRUB SERPL-MCNC: 0.9 MG/DL (ref 0.2–1)
BUN SERPL-MCNC: 18 MG/DL (ref 6–20)
BUN/CREAT SERPL: 11 (ref 12–20)
CALCIUM SERPL-MCNC: 8.6 MG/DL (ref 8.5–10.1)
CALCULATED P AXIS, ECG09: 40 DEGREES
CALCULATED P AXIS, ECG09: 53 DEGREES
CALCULATED R AXIS, ECG10: 0 DEGREES
CALCULATED R AXIS, ECG10: 23 DEGREES
CALCULATED T AXIS, ECG11: 38 DEGREES
CALCULATED T AXIS, ECG11: 51 DEGREES
CHLORIDE SERPL-SCNC: 109 MMOL/L (ref 97–108)
CO2 SERPL-SCNC: 23 MMOL/L (ref 21–32)
CREAT SERPL-MCNC: 1.67 MG/DL (ref 0.7–1.3)
DIAGNOSIS, 93000: NORMAL
DIAGNOSIS, 93000: NORMAL
DIFFERENTIAL METHOD BLD: ABNORMAL
EOSINOPHIL # BLD: 0.5 K/UL (ref 0–0.4)
EOSINOPHIL NFR BLD: 6 % (ref 0–7)
ERYTHROCYTE [DISTWIDTH] IN BLOOD BY AUTOMATED COUNT: 14.6 % (ref 11.5–14.5)
GLOBULIN SER CALC-MCNC: 5.1 G/DL (ref 2–4)
GLUCOSE SERPL-MCNC: 98 MG/DL (ref 65–100)
HCT VFR BLD AUTO: 28.6 % (ref 36.6–50.3)
HGB BLD-MCNC: 9.4 G/DL (ref 12.1–17)
IMM GRANULOCYTES # BLD AUTO: 0 K/UL (ref 0–0.04)
IMM GRANULOCYTES NFR BLD AUTO: 0 % (ref 0–0.5)
LYMPHOCYTES # BLD: 1.4 K/UL (ref 0.8–3.5)
LYMPHOCYTES NFR BLD: 17 % (ref 12–49)
MCH RBC QN AUTO: 32.4 PG (ref 26–34)
MCHC RBC AUTO-ENTMCNC: 32.9 G/DL (ref 30–36.5)
MCV RBC AUTO: 98.6 FL (ref 80–99)
MONOCYTES # BLD: 0.5 K/UL (ref 0–1)
MONOCYTES NFR BLD: 6 % (ref 5–13)
NEUTS SEG # BLD: 5.6 K/UL (ref 1.8–8)
NEUTS SEG NFR BLD: 70 % (ref 32–75)
NRBC # BLD: 0 K/UL (ref 0–0.01)
NRBC BLD-RTO: 0 PER 100 WBC
P-R INTERVAL, ECG05: 174 MS
P-R INTERVAL, ECG05: 182 MS
PLATELET # BLD AUTO: 205 K/UL (ref 150–400)
PMV BLD AUTO: 11.5 FL (ref 8.9–12.9)
POTASSIUM SERPL-SCNC: 4.4 MMOL/L (ref 3.5–5.1)
PROT SERPL-MCNC: 7.4 G/DL (ref 6.4–8.2)
Q-T INTERVAL, ECG07: 324 MS
Q-T INTERVAL, ECG07: 358 MS
QRS DURATION, ECG06: 72 MS
QRS DURATION, ECG06: 72 MS
QTC CALCULATION (BEZET), ECG08: 386 MS
QTC CALCULATION (BEZET), ECG08: 442 MS
RBC # BLD AUTO: 2.9 M/UL (ref 4.1–5.7)
SODIUM SERPL-SCNC: 137 MMOL/L (ref 136–145)
TSH SERPL DL<=0.05 MIU/L-ACNC: 1.62 UIU/ML (ref 0.36–3.74)
VENTRICULAR RATE, ECG03: 112 BPM
VENTRICULAR RATE, ECG03: 70 BPM
WBC # BLD AUTO: 8 K/UL (ref 4.1–11.1)

## 2022-11-28 PROCEDURE — 97110 THERAPEUTIC EXERCISES: CPT | Performed by: PHYSICAL THERAPIST

## 2022-11-28 PROCEDURE — 93005 ELECTROCARDIOGRAM TRACING: CPT

## 2022-11-28 PROCEDURE — 97165 OT EVAL LOW COMPLEX 30 MIN: CPT | Performed by: OCCUPATIONAL THERAPIST

## 2022-11-28 PROCEDURE — 36415 COLL VENOUS BLD VENIPUNCTURE: CPT

## 2022-11-28 PROCEDURE — 97530 THERAPEUTIC ACTIVITIES: CPT | Performed by: PHYSICAL THERAPIST

## 2022-11-28 PROCEDURE — 65270000046 HC RM TELEMETRY

## 2022-11-28 PROCEDURE — 70551 MRI BRAIN STEM W/O DYE: CPT

## 2022-11-28 PROCEDURE — 97110 THERAPEUTIC EXERCISES: CPT | Performed by: OCCUPATIONAL THERAPIST

## 2022-11-28 PROCEDURE — 77010033678 HC OXYGEN DAILY

## 2022-11-28 PROCEDURE — 80053 COMPREHEN METABOLIC PANEL: CPT

## 2022-11-28 PROCEDURE — C9113 INJ PANTOPRAZOLE SODIUM, VIA: HCPCS | Performed by: STUDENT IN AN ORGANIZED HEALTH CARE EDUCATION/TRAINING PROGRAM

## 2022-11-28 PROCEDURE — 74011000250 HC RX REV CODE- 250: Performed by: STUDENT IN AN ORGANIZED HEALTH CARE EDUCATION/TRAINING PROGRAM

## 2022-11-28 PROCEDURE — 84443 ASSAY THYROID STIM HORMONE: CPT

## 2022-11-28 PROCEDURE — 95816 EEG AWAKE AND DROWSY: CPT | Performed by: PSYCHIATRY & NEUROLOGY

## 2022-11-28 PROCEDURE — 95717 EEG PHYS/QHP 2-12 HR W/O VID: CPT | Performed by: PSYCHIATRY & NEUROLOGY

## 2022-11-28 PROCEDURE — 97161 PT EVAL LOW COMPLEX 20 MIN: CPT | Performed by: PHYSICAL THERAPIST

## 2022-11-28 PROCEDURE — 92610 EVALUATE SWALLOWING FUNCTION: CPT

## 2022-11-28 PROCEDURE — 85025 COMPLETE CBC W/AUTO DIFF WBC: CPT

## 2022-11-28 PROCEDURE — 74011250636 HC RX REV CODE- 250/636: Performed by: EMERGENCY MEDICINE

## 2022-11-28 PROCEDURE — 74011250636 HC RX REV CODE- 250/636: Performed by: STUDENT IN AN ORGANIZED HEALTH CARE EDUCATION/TRAINING PROGRAM

## 2022-11-28 PROCEDURE — 99223 1ST HOSP IP/OBS HIGH 75: CPT | Performed by: PSYCHIATRY & NEUROLOGY

## 2022-11-28 PROCEDURE — 74011000258 HC RX REV CODE- 258: Performed by: EMERGENCY MEDICINE

## 2022-11-28 PROCEDURE — 74011250636 HC RX REV CODE- 250/636: Performed by: GENERAL ACUTE CARE HOSPITAL

## 2022-11-28 RX ADMIN — LEVETIRACETAM 1000 MG: 100 INJECTION INTRAVENOUS at 01:59

## 2022-11-28 RX ADMIN — SODIUM CHLORIDE 40 MG: 9 INJECTION, SOLUTION INTRAMUSCULAR; INTRAVENOUS; SUBCUTANEOUS at 09:09

## 2022-11-28 RX ADMIN — CEFTRIAXONE 1 G: 1 INJECTION, POWDER, FOR SOLUTION INTRAMUSCULAR; INTRAVENOUS at 14:39

## 2022-11-28 RX ADMIN — LEVETIRACETAM 1000 MG: 100 INJECTION INTRAVENOUS at 13:21

## 2022-11-28 RX ADMIN — SODIUM CHLORIDE 40 MG: 9 INJECTION, SOLUTION INTRAMUSCULAR; INTRAVENOUS; SUBCUTANEOUS at 21:27

## 2022-11-28 RX ADMIN — SODIUM CHLORIDE 75 ML/HR: 9 INJECTION, SOLUTION INTRAVENOUS at 10:43

## 2022-11-28 NOTE — PROGRESS NOTES
2021H: TRANSFER - IN REPORT:    Verbal report received from [facility] RN(name) on Richard Gutierrez.  being received from ED(unit) for routine progression of care      Report consisted of patients Situation, Background, Assessment and   Recommendations(SBAR). Information from the following report(s) SBAR, Kardex, Intake/Output, MAR, Recent Results, and Cardiac Rhythm SINUS RHYTHM  was reviewed with the receiving nurse. Opportunity for questions and clarification was provided. Assessment completed upon patients arrival to unit and care assumed. -FREQUENT NEURO CHECK DONE  -CHG BATH GIVEN;    End of Shift Note    Bedside shift change [facility] nurse) by Tania Pyle RN (offgoing nurse). Report included the following information SBAR, Kardex, Intake/Output, MAR, Recent Results, Med Rec Status, and Cardiac Rhythm sinus rhythm    Shift worked:  1900h: 0730h     Shift summary and any significant changes:     Frequent rounds done for neuro check; suctioned oral as necessary.  No seizure observed and reported during the shift- wife on the bedside     Concerns for physician to address:  none     Zone phone for oncoming shift:   N/a       Activity:  Activity Level: Bed Rest  Number times ambulated in hallways past shift: 0  Number of times OOB to chair past shift: 0    Cardiac:   Cardiac Monitoring: Yes      Cardiac Rhythm: Sinus Rhythm    Access:  Current line(s): PIV     Genitourinary:   Urinary status: voiding and external catheter    Respiratory:   O2 Device: None (Room air)  Chronic home O2 use?: NO  Incentive spirometer at bedside: NO       GI:     Current diet:  DIET NPO  Passing flatus: YES  Tolerating current diet: no       Pain Management:   Patient states pain is manageable on current regimen: YES    Skin:  Hunter Score: 13  Interventions: turn team, speciality bed, float heels, foam dressing, PT/OT consult, and internal/external urinary devices    Patient Safety:  Fall Score:  Total Score: 2  Interventions: bed/chair alarm, assistive device (walker, cane, etc), gripper socks, and stay with me (per policy)       Length of Stay:  Expected LOS: - - -  Actual LOS: [address], RN

## 2022-11-28 NOTE — PROGRESS NOTES
OCCUPATIONAL THERAPY EVALUATION/DISCHARGE  Patient: Ching Hoffman (73 y.o. male)  Date: 11/28/2022  Primary Diagnosis: Breakthrough seizure ([facility] [address] [G40.919]       Precautions:        ASSESSMENT  Based on the objective data described below, the patient presents with baseline dementia and a h/o CVA with LUE and LLE residual deficits. Patient's dementia related cognitive impairments have been severe for several months now, with patient unable to follow commands, as well as requiring total A for all ADLs, bed mobility and transfers. Some increased tone was noted in the LLE and spontaneous movement was only noted in the RUE and RLE. He was unable to follow commands for active participation during this evaluation as well. Patient's wife was interested in learning how to prevent patient from getting stiff from being immobile and time was taken to instruct her on the performance of BUE PROM. Also educated patient's wife on the importance of using proper body mechanics when providing care/PROM and proper positioning of patient for prevention of skin break down. Upon completion of all training provided the patient's wife was able to verbalize full understanding. PT provided PROM hand outs and instructed patient's wife on BLE PROM. At this time the patient is not appropriate for OT services due to being at his dependent baseline and being unable to follow commands for active participation. Functional Outcome Measure: The patient scored 0/100 on the Barthel Index outcome measure which is indicative of a 100% decline in function. PLAN :  Recommendation for discharge: (in order for the patient to meet his/her long term goals)  No skilled occupational therapy/ follow up rehabilitation needs identified at this time.     Equipment recommendations for successful discharge: Jena Ritter lift       OBJECTIVE DATA SUMMARY:   HISTORY:   Past Medical History:   Diagnosis Date    Cholesterolosis     Hypertension     Kidney disease Seizure ([facility] [address]     Stroke ([facility] [address]    No past surgical history on file. Prior Level of Function/Environment/Context: Per patient's wife the patient has been in an and out of the hospital multiple times this year, with time spent in SNF rehab facilities in between. Just prior to his recent return home with his wife and son, the patient was in a Nursing facility ([facility]) for 30 days without therapy services. Patient's wife reports that therapy staff at  [facility] rehab in [place], where they moved from, stated that the patient was not following commands during attempted therapy intervention. Patient has been dependent for bed mobility, transfers and all ADLs for several months now. Expanded or extensive additional review of patient history:   Home Situation  Patient Expects to be Discharged to[de-identified] Home    Hand dominance: Right    EXAMINATION OF PERFORMANCE DEFICITS:  Cognitive/Behavioral Status:  Neurologic State: Drowsy  Orientation Level: Unable to verbalize  Cognition: No command following             Hearing: Auditory  Auditory Impairment: None    Vision/Perceptual:              Range of Motion:  AROM: Grossly decreased, non-functional    Strength:  Strength: Grossly decreased, non-functional  Coordination:  Coordination: Grossly decreased, non-functional            Tone & Sensation:  Tone: Abnormal         Functional Mobility and Transfers for ADLs:  Bed Mobility:  Rolling: Total assistance    ADL Assessment:  Feeding: Total assistance    Oral Facial Hygiene/Grooming: Total assistance    Bathing: Total assistance    Upper Body Dressing: Total assistance    Lower Body Dressing: Total assistance    Toileting: Total assistance                Intervention and education:   Instructed patient wife on performance of UE PROM, discussed positioning for prevention of skin break down  and use of proper body mechanics when performing bed level care and PROM.    Functional Measure:  Barthel Index:    Bathin  Bladder: 0  Bowels: 0  Groomin  Dressin  Feedin  Mobility: 0  Stairs: 0  Toilet Use: 0  Transfer (Bed to Chair and Back): 0  Total: 0/100        The Barthel ADL Index: Guidelines  1. The index should be used as a record of what a patient does, not as a record of what a patient could do. 2. The main aim is to establish degree of independence from any help, physical or verbal, however minor and for whatever reason. 3. The need for supervision renders the patient not independent. 4. A patient's performance should be established using the best available evidence. Asking the patient, friends/relatives and nurses are the usual sources, but direct observation and common sense are also important. However direct testing is not needed. 5. Usually the patient's performance over the preceding 24-48 hours is important, but occasionally longer periods will be relevant. 6. Middle categories imply that the patient supplies over 50 per cent of the effort. 7. Use of aids to be independent is allowed. Mark Becker., Barthel, D.W. (9623). Functional evaluation: the Barthel Index. [address] [facility] (14)2. [place] Husbands alondra MIGUEL ANGEL Pool, [facility]., [facility]., [place], [address] [place] [address] (). Measuring the change indisability after inpatient rehabilitation; comparison of the responsiveness of the Barthel Index and Functional [place] Measure. Journal of Neurology, Neurosurgery, and Psychiatry, 66(4), 469-986. Idalia Esquivel, [email or web address].[email or web address], SONYA Pichardo, & Fabby Fontana, M.A. (2004.) Assessment of post-stroke quality of life in cost-effectiveness studies: The usefulness of the Barthel Index and the EuroQoL-5D. Quality of Life Research, 13, 352-12       Activity Tolerance:   Poor  Please refer to the flowsheet for vital signs taken during this treatment.     After treatment patient left in no apparent distress:    Supine in bed, Call bell within reach, Bed / chair alarm activated, and Caregiver / family present    COMMUNICATION/EDUCATION: The patients plan of care was discussed with: Physical Therapist and Registered Nurse.     Thank you for this referral.  Waqas Ingram, OTR/L  Time Calculation: 31 mins

## 2022-11-28 NOTE — PROCEDURES
EEG REPORT    Patient Name: Demi Callahan : 1945  Age: 76 y.o. Ordering physician: Dr. Fawn Mason  Date of EE2022  8:58 - 9:19  Diagnosis: seizure  Interpreting physician: Mariza Barrera D.O. FAAN    Procedure: EEG    CLINICAL INDICATION: The patient is a 76 y.o. male who is being evaluated for baseline electro cerebral activities and to rule out seizure focus. Current Facility-Administered Medications   Medication Dose Route Frequency    cefTRIAXone (ROCEPHIN) 1 g in 0.9% sodium chloride (MBP/ADV) 50 mL MBP  1 g IntraVENous Q24H    acetaminophen (TYLENOL) tablet 650 mg  650 mg Oral Q6H PRN    ondansetron (ZOFRAN) injection 4 mg  4 mg IntraVENous Q4H PRN    pantoprazole (PROTONIX) 40 mg in 0.9% sodium chloride 10 mL injection  40 mg IntraVENous Q12H    albuterol-ipratropium (DUO-NEB) 2.5 MG-0.5 MG/3 ML  3 mL Nebulization Q6H PRN    hydrALAZINE (APRESOLINE) 20 mg/mL injection 10 mg  10 mg IntraVENous Q6H PRN    LORazepam (INTENSOL) 2 mg/mL oral concentrate 2 mg  2 mg Oral Q4H PRN    0.9% sodium chloride infusion  75 mL/hr IntraVENous CONTINUOUS    levETIRAcetam (KEPPRA) injection 1,000 mg  1,000 mg IntraVENous Q12H           DESCRIPTION OF PROCEDURE:     This is a digitally recorded electroencephalogram  Electrodes were applied in accordance with the international 10-20 system of electrode placement. 18 channels of scalp EEG are recorded  A channel was used for EoG  Another channel was used for ECG   The data is stored digitally and reviewed in reformatted montages for optimal display  EEG  was reviewed in both bipolar and referential montages    Description of Activity: The background of this recording contains no well-formed alpha activity seen. There was a mixture of diffuse theta and delta activity identified throughout the study. Throughout the recording, there were no clear areas of focal slowing nor spike or spike-and-wave discharges seen.      Hyperventilation was not performed. Photic stimulation produced no response in the posterior head regions. During the recording, the patient did not achieve stage II sleep      Clinical Interpretation: This EEG is abnormal. There is mild generalized slowing as seen in encephalopathies. There is no focal asymmetry, seizures or epileptiform discharges seen. Clinical correlation is recommended        LIBERTAD Reeves

## 2022-11-28 NOTE — PROGRESS NOTES
MRI Pending:    Need completed online [employer] MRI screening form. Sign and fax to [phone]. Call [phone] once faxed.

## 2022-11-28 NOTE — PROGRESS NOTES
Spiritual Care Assessment/Progress Note  [facility]      NAME: Eliza England MRN: [id number]  AGE: 76 y.o.  SEX: male  Quaker Affiliation: No Pentecostalism   Language: English     11/28/2022     Total Time (in minutes): 15     Spiritual Assessment begun in [facility] 2 PROGRESSIVE CARE through conversation with:         []Patient        [x] Family    [] Friend(s)        Reason for Consult: Advance medical directive consult     Spiritual beliefs: (Please include comment if needed)     [] Identifies with a frances tradition:         [] Supported by a frances community:            [] Claims no spiritual orientation:           [] Seeking spiritual identity:                [] Adheres to an individual form of spirituality:           [x] Not able to assess:                           Identified resources for coping:      [] Prayer                               [] Music                  [] Guided Imagery     [x] Family/friends                 [] Pet visits     [] Devotional reading                         [] Unknown     [] Other:                                                Interventions offered during this visit: (See comments for more details)          Family/Friend(s): Advance medical directive consult, Affirmation of frances, Catharsis/review of pertinent events in supportive environment, Prayer (assurance of)     Plan of Care:     [x] Support spiritual and/or cultural needs    [] Support AMD and/or advance care planning process      [] Support grieving process   [] Coordinate Rites and/or Rituals    [] Coordination with community clergy   [] No spiritual needs identified at this time   [] Detailed Plan of Care below (See Comments)  [] Make referral to Music Therapy  [] Make referral to Pet Therapy     [] Make referral to Addiction services  [] Make referral to [facility]  [] Make referral to Spiritual Care Partner  [] No future visits requested        [x] Contact Spiritual Care for further referrals Comments:  ACP Assessment:     Received request from in basket request for advance medical directive (AMD). Upon review of chart and communication with care team, Spiritual Care will defer Advance Care planning with patient at this time. Patient and Spouse were present in the room during visit. Patient was sleeping. When this  first spoke with wife concerning AMD, she indicated her son would be the person to speak with. Explained to wife that given patient's medical condition, we would be unable to assist with an advance medical directive. She expressed understanding. No spiritual needs or concerns were expressed at this time. Mrs Nikolas Zarate was receptive to assurance of prayer.        NISHA Viveros, [facility], Staff [address]    [address] Paging Service  [phone]

## 2022-11-28 NOTE — PROGRESS NOTES
PHYSICAL THERAPY EVALUATION/DISCHARGE  Patient: Jamey Wilson (73 y.o. male)  Date: 11/28/2022  Primary Diagnosis: Breakthrough seizure ([facility] [address] [G40.919]        ASSESSMENT  Based on the objective data described below, the patient presents with dementia and inability to follow commands and activity participate in therapy. Patient with h/o prior CVA with residual L sided impairment. Spontaneous active movement noted in R UE and LE. He demonstrates abnormal tone in all extremities and lacks end-range ROM in B ankles, knees hips and elbows. Patient is dependent for all mobility. Wife present and reports that patient is dependent for mobility at baseline. Patient had recently started HHPT for family training with only one session completed. Wife interested in information regarding PROM exercises. Wife educated on PROM of B UEs and LEs and written HEP issued. No further PT needs identified at this time. Family is awaiting delivery of w/c and patient has a hospital bed. Will need kendra lift for OOB transfers. Family would benefit from HHPT for transfer training following discharge    Functional Outcome Measure: The patient scored 0/100 on the Barthel Index outcome measure which is indicative of dependence. Further skilled acute physical therapy is not indicated at this time. PLAN :  Recommendation for discharge: (in order for the patient to meet his/her long term goals)  HHPT for family transfer training    This discharge recommendation:  Has not yet been discussed the attending provider and/or case management    IF patient discharges home will need the following DME: mechanical lift       SUBJECTIVE:   Patient is non-verbal    OBJECTIVE DATA SUMMARY:   HISTORY:    Past Medical History:   Diagnosis Date    Cholesterolosis     Hypertension     Kidney disease     Seizure ([facility] [address]     Stroke ([facility] [address]    No past surgical history on file.     Prior level of function: dependence      Home Situation  Patient Expects to be Discharged to[de-identified] Home    EXAMINATION/PRESENTATION/DECISION MAKING:   Critical Behavior:  Neurologic State: Lethargic, Eyes open spontaneously  Orientation Level: Unable to verbalize  Cognition: Decreased attention/concentration, No command following, Poor safety awareness     Hearing: Auditory  Auditory Impairment: None    Range Of Motion:  AROM: Grossly decreased, non-functional           PROM: Generally decreased, functional           Strength:    Strength: Grossly decreased, non-functional                    Tone & Sensation:   Tone: Abnormal                              Coordination:  Coordination: Grossly decreased, non-functional  Vision:      Functional Mobility:  Bed Mobility:  Rolling: Total assistance            Therapeutic Exercises:   Performed and educated wife on PROM exercises to all extremities     Functional Measure:  Barthel Index:    Bathin  Bladder: 0  Bowels: 0  Groomin  Dressin  Feedin  Mobility: 0  Stairs: 0  Toilet Use: 0  Transfer (Bed to Chair and Back): 0  Total: 0/100       The Barthel ADL Index: Guidelines  1. The index should be used as a record of what a patient does, not as a record of what a patient could do. 2. The main aim is to establish degree of independence from any help, physical or verbal, however minor and for whatever reason. 3. The need for supervision renders the patient not independent. 4. A patient's performance should be established using the best available evidence. Asking the patient, friends/relatives and nurses are the usual sources, but direct observation and common sense are also important. However direct testing is not needed. 5. Usually the patient's performance over the preceding 24-48 hours is important, but occasionally longer periods will be relevant. 6. Middle categories imply that the patient supplies over 50 per cent of the effort. 7. Use of aids to be independent is allowed.     Score Interpretation (from [address] [place] [address])    Independent   60-79 Minimally independent   40-59 Partially dependent   20-39 Very dependent   <20 Totally dependent     -Vicki Hernandez., BarthelNGUYEN. (1965). Functional evaluation: the Barthel Index. [address] ([address] (1997). The Barthel activities of daily living index: self-reporting versus actual performance in the old (> or = 75 years). Journal of [address] [place] [address] 457), [facility], MIGUEL ANGEL, Artemio Noel., [place] Gasca. (1999). Measuring the change in disability after inpatient rehabilitation; comparison of the responsiveness of the Barthel Index and Functional [place] Measure. Journal of Neurology, Neurosurgery, and Psychiatry, 66(4), 000-747. DAVIDE Montes, SONYA Pichardo, & Dylan Weaver M.A. (2004) Assessment of post-stroke quality of life in cost-effectiveness studies: The usefulness of the Barthel Index and the EuroQoL-5D. Quality of Life Research, 13, 125-73          After treatment patient left in no apparent distress:   Supine in bed, Call bell within reach, and Caregiver / family present    COMMUNICATION/EDUCATION:   The patients plan of care was discussed with: Occupational therapist, Speech therapist, and Registered nurse. Fall prevention education was provided and the patient/caregiver indicated understanding., Patient/family have participated as able in goal setting and plan of care. , and Patient/family agree to work toward stated goals and plan of care.     Thank you for this referral.  Asuncion Cooper, PT   Time Calculation: 35 mins

## 2022-11-28 NOTE — PROGRESS NOTES
Reason for Admission:  Breakthrough Seizure                     RUR Score:  13%                   Plan for utilizing home health:   Current with [facility]       PCP: First and Last name:  Radha Lima MD     Name of Practice:    Are you a current patient: Yes/No:    Approximate date of last visit: Unsure   Can you participate in a virtual visit with your PCP:                     Current Advanced Directive/Advance Care Plan: Full Code  CM confirmed patient is a Full Code    Healthcare Decision Maker:   Click here to complete [email or web address] including selection of the Healthcare Decision Maker Relationship (ie \"Primary\")           Wife, Jazzy Braxton, [phone]                  Transition of Care Plan:                    Patient lives at home with his wife and son, but was currently at [facility] for rehab. Patient uses a wheelchair at home. Patient requires assistance in care. Patient is current with [facility]. Patient will resume services with them at discharge. Patient uses [facility] on [address]. Current Dispo: Home with .

## 2022-11-28 NOTE — PROGRESS NOTES
Problem: Dysphagia (Adult)  Goal: *Acute Goals and Plan of Care (Insert Text)  Description: Speech pathology goals initiated 11/28/2022   1. Patient will tolerate a puree diet/ thin liquids free of s/s aspiration within 7 days   2. Patient will demonstrate timely mastication of soft solids in order to advance diet within 7 days  Outcome: Not Met   [address] EVALUATION  Patient: Lesley Blackmon (73 y.o. male)  Date: 11/28/2022  Primary Diagnosis: Breakthrough seizure ([facility] [address] [G40.919]       Precautions:       ASSESSMENT :  Based on the objective data described below, the patient presents with moderate oral dysphagia characterized by prolonged bolus manupulation with softened solid, delayed posterior propulsion. Strong cough x1 with successive straw sips of thin only but no overt s/s aspiration with 1 cup applesauce or further thin trials. Wife at bedside who served as historian as patient with advanced Dementia, non-verbal at baseline. Wife reporting patient was at [address] [place] [address],[address] on a puree diet; however, SLP recently started trialing softer solids. At this time, patient at increased aspiration given lethargy, history of CVA, advanced Dementia and dependence for feeding. Patient will benefit from skilled intervention to address the above impairments. Patients rehabilitation potential is considered to be Guarded     PLAN :  Recommendations and Planned Interventions:  Puree diet/ thin liquids. SINGLE straw sips ok  Crush meds  Alterate liquid/puree, Assist with all meals  Hold if too lethargic  Strict upright positioning with all PO intake    Frequency/Duration: Patient will be followed by speech-language pathology 3 times a week to address goals. Discharge Recommendations: Skilled Nursing Facility     SUBJECTIVE:   Patient's wife at bedside providing history.     OBJECTIVE:     Past Medical History:   Diagnosis Date    Cholesterolosis     Hypertension     Kidney disease Seizure ([facility] [address]     Stroke ([facility] [address]    No past surgical history on file. Prior Level of Function/Home Situation:   Home Situation  Patient Expects to be Discharged to[de-identified] Home  Diet prior to admission: puree/thin immediately pta at [address] [place] [address],[address]  Current Diet:  npo   Cognitive and Communication Status:  Neurologic State: Drowsy  Orientation Level: Unable to verbalize  Cognition: No command following           Oral Assessment:  Oral Assessment  Labial:  (no command following)  Dentition: Natural  P.O. Trials:  Patient Position: up in bed  Vocal quality prior to P.O.:    Consistency Presented: Thin liquid;Puree;Mechanical soft  How Presented: Successive swallows;Straw;SLP-fed/presented;Spoon     Bolus Acceptance: No impairment  Bolus Formation/Control: Impaired  Type of Impairment: Delayed;Mastication  Propulsion: Delayed (# of seconds)  Oral Residue: Less than 10% of bolus        Aspiration Signs/Symptoms: Strong cough (x1 with successive sips)     Effective Modifications: Small sips and bites          Oral Phase Severity: Moderate       NOMS:   The NOMS functional outcome measure was used to quantify this patient's level of swallowing impairment. Based on the NOMS, the patient was determined to be at level 4 for swallow function       NOMS Swallowing Levels:  Level 1 (CN): NPO  Level 2 (CM): NPO but takes consistency in therapy  Level 3 (CL): Takes less than 50% of nutrition p.o. and continues with nonoral feedings; and/or safe with mod cues; and/or max diet restriction  Level 4 (CK): Safe swallow but needs mod cues; and/or mod diet restriction; and/or still requires some nonoral feeding/supplements  Level 5 (CJ): Safe swallow with min diet restriction; and/or needs min cues  Level 6 (CI): Independent with p.o.; rare cues; usually self cues; may need to avoid some foods or needs extra time  Level 7 ([address] [place] [address]): Independent for all p.o.  ANÍBAL. (2003).  National Outcomes Measurement System (NOMS): Adult Speech-Language Pathology User's Guide. Pain:  Pain Scale 1: FLACC  Pain Intensity 1: 0       After treatment:   Patient left in no apparent distress in bed, Call bell within reach, Nursing notified, and Caregiver / family present    COMMUNICATION/EDUCATION:     The patient's plan of care including recommendations, planned interventions, and recommended diet changes were discussed with: Registered nurse. Patient/family agree to work toward stated goals and plan of care.     Thank you for this referral.  Marty Booth M.S. CCC-SLP  Time Calculation: 22 mins

## 2022-11-28 NOTE — PROCEDURES
EEG REPORT    Patient Name: Angel Bailon. : 1945  Age: 76 y.o. Ordering physician: Dr. Solo Wilson  Date of EE2022   16:555 - 17:04,  17:23 - 20:20  Diagnosis: encephalopathy  Interpreting physician: Susu Barrera D.O. FAAN    Procedure: EEG    CLINICAL INDICATION: The patient is a 76 y.o. male who is being evaluated for baseline electro cerebral activities and to rule out seizure focus. Current Facility-Administered Medications   Medication Dose Route Frequency    cefTRIAXone (ROCEPHIN) 1 g in 0.9% sodium chloride (MBP/ADV) 50 mL MBP  1 g IntraVENous Q24H    acetaminophen (TYLENOL) tablet 650 mg  650 mg Oral Q6H PRN    ondansetron (ZOFRAN) injection 4 mg  4 mg IntraVENous Q4H PRN    pantoprazole (PROTONIX) 40 mg in 0.9% sodium chloride 10 mL injection  40 mg IntraVENous Q12H    albuterol-ipratropium (DUO-NEB) 2.5 MG-0.5 MG/3 ML  3 mL Nebulization Q6H PRN    hydrALAZINE (APRESOLINE) 20 mg/mL injection 10 mg  10 mg IntraVENous Q6H PRN    LORazepam (INTENSOL) 2 mg/mL oral concentrate 2 mg  2 mg Oral Q4H PRN    levETIRAcetam (KEPPRA) injection 1,000 mg  1,000 mg IntraVENous Q12H           DESCRIPTION OF PROCEDURE:     This is a digitally recorded electroencephalogram    Description of procedure: This EEG was obtained using a 10 lead, 8 channel system positioned circumferentially without any parasagittal coverage (rapid EEG). Computer selected EEG is reviewed as well as background features and all clinically significant events. Clarity algorithm utilized and implemented to provide analysis of underlying activity and seizure detection used to facilitate reading. Description of recording: The background of this recording contains no well-formed alpha activity seen. There was a mixtures of diffuse theta and delta activity identified throughout the study. Throughout the recording, there were no clear areas of focal slowing nor spike or spike-and-wave discharges seen.   Automated seizure detection burden was reviewed. Hyperventilation and photic stimulation were not performed. Clinical Interpretation: This EEG is abnormal. There is mild generalized slowing as seen in encephalopathies. There is no focal asymmetry, seizures or epileptiform discharges seen    Clinical correlation is recommended      Comment:   If there is still persistent suspicion for continued seizure-like  activity, I would recommend obtaining an electroencephalogram with the 10-20 international system for improved spatial resolution and parasagittal coverage. Brigido Barrera D.O.   Roula Nair

## 2022-11-28 NOTE — ACP (ADVANCE CARE PLANNING)
Advance Care Planning   Advance Care Planning Inpatient Note  [address]  Spiritual Care Department    Today's Date: 11/28/2022  Unit: [facility] 2 PROGRESSIVE CARE    Received request from  in basket request for advance medical directive (AMD) . Upon review of chart and communication with care team, Spiritual Care will defer Advance Care planning with patient at this time. Patient and Spouse were present in the room during visit. Patient was sleeping. Explained to wife that given patient's medical condition, we would be unable to assist with an advance medical directive. She expressed understanding. Goals of ACP Conversation:       Health Care Decision Makers:    No healthcare decision makers have been documented. Click here to complete [address] including selection of the Healthcare Decision Maker Relationship (ie \"Primary\")      Summary:  No Decision Maker named by patient at this time    Advance Care Planning Documents (Patient Wishes) on file:  None     Assessment:     Received request from in basket request for advance medical directive (AMD). Upon review of chart and communication with care team, Spiritual Care will defer Advance Care planning with patient at this time. Patient and Spouse were present in the room during visit. Patient was sleeping. When this  first spoke with wife concerning AMD, she indicated her son would be the person to speak with. Explained to wife that given patient's medical condition, we would be unable to assist with an advance medical directive. She expressed understanding.      Interventions:  Deferred conversation as patient is unable to participate or understand AMD conversation    Care Preferences Communicated:  No    Outcomes/Plan:  ACP Discussion Refused    Elliot Valenzuela, [facility]  on 11/28/2022 at 2:12 PM

## 2022-11-28 NOTE — PROGRESS NOTES
End of Shift Note    Bedside shift change report given to Willam Gasca (oncoming nurse) by Tamara Ferguson RN (offgoing nurse). Report included the following information SBAR    Shift worked:  7am-7pm     Shift summary and any significant changes:     No significant pacheco MRI form completed     Concerns for physician to address:  None      Zone phone for oncoming shift:          Activity:  Activity Level: Bed Rest  Number times ambulated in hallways past shift: 0  Number of times OOB to chair past shift: 0    Cardiac:   Cardiac Monitoring: Yes      Cardiac Rhythm: Sinus Rhythm    Access:  Current line(s): PIV     Genitourinary:   Urinary status: incontinent and external catheter    Respiratory:   O2 Device: None (Room air)  Chronic home O2 use? NO  Incentive spirometer at bedside: NO       GI:     Current diet:  ADULT DIET Dysphagia - Pureed  Passing flatus: NO  Tolerating current diet: YES       Pain Management:   Patient states pain is manageable on current regimen: YES    Skin:  Hunter Score: 9  Interventions: float heels    Patient Safety:  Fall Score:  Total Score: 2  Interventions: bed/chair alarm  High Fall Risk: Yes    Length of Stay:  Expected LOS: - - -  Actual LOS: 1      Blaine Pineda RN

## 2022-11-28 NOTE — PROGRESS NOTES
Problem: Falls - Risk of  Goal: *Absence of Falls  Description: Document Yesy Litter Fall Risk and appropriate interventions in the flowsheet. Outcome: Progressing Towards Goal  Note: Fall Risk Interventions:       Mentation Interventions: Adequate sleep, hydration, pain control    Medication Interventions: Assess postural VS orthostatic hypotension, Bed/chair exit alarm    Elimination Interventions: Bed/chair exit alarm, Call light in reach              Problem: Patient Education: Go to Patient Education Activity  Goal: Patient/Family Education  Outcome: Progressing Towards Goal     Problem: Pressure Injury - Risk of  Goal: *Prevention of pressure injury  Description: Document Hunter Scale and appropriate interventions in the flowsheet.   Outcome: Progressing Towards Goal  Note: Pressure Injury Interventions:  Sensory Interventions: Assess changes in LOC    Moisture Interventions: Absorbent underpads, Apply protective barrier, creams and emollients    Activity Interventions: Assess need for specialty bed    Mobility Interventions: Assess need for specialty bed, Float heels    Nutrition Interventions: Document food/fluid/supplement intake    Friction and Shear Interventions: Apply protective barrier, creams and emollients, Feet elevated on foot rest

## 2022-11-29 VITALS
BODY MASS INDEX: 25.58 KG/M2 | OXYGEN SATURATION: 100 % | HEART RATE: 91 BPM | TEMPERATURE: 98.6 F | HEIGHT: 67 IN | DIASTOLIC BLOOD PRESSURE: 61 MMHG | WEIGHT: 163 LBS | SYSTOLIC BLOOD PRESSURE: 117 MMHG | RESPIRATION RATE: 16 BRPM

## 2022-11-29 LAB
ANION GAP SERPL CALC-SCNC: 5 MMOL/L (ref 5–15)
BASOPHILS # BLD: 0.1 K/UL (ref 0–0.1)
BASOPHILS NFR BLD: 1 % (ref 0–1)
BUN SERPL-MCNC: 20 MG/DL (ref 6–20)
BUN/CREAT SERPL: 12 (ref 12–20)
CALCIUM SERPL-MCNC: 8.3 MG/DL (ref 8.5–10.1)
CHLORIDE SERPL-SCNC: 111 MMOL/L (ref 97–108)
CO2 SERPL-SCNC: 23 MMOL/L (ref 21–32)
CREAT SERPL-MCNC: 1.66 MG/DL (ref 0.7–1.3)
DIFFERENTIAL METHOD BLD: ABNORMAL
EOSINOPHIL # BLD: 0.5 K/UL (ref 0–0.4)
EOSINOPHIL NFR BLD: 7 % (ref 0–7)
ERYTHROCYTE [DISTWIDTH] IN BLOOD BY AUTOMATED COUNT: 14.6 % (ref 11.5–14.5)
GLUCOSE SERPL-MCNC: 116 MG/DL (ref 65–100)
HCT VFR BLD AUTO: 27.7 % (ref 36.6–50.3)
HGB BLD-MCNC: 9.1 G/DL (ref 12.1–17)
IMM GRANULOCYTES # BLD AUTO: 0 K/UL (ref 0–0.04)
IMM GRANULOCYTES NFR BLD AUTO: 0 % (ref 0–0.5)
LYMPHOCYTES # BLD: 1.5 K/UL (ref 0.8–3.5)
LYMPHOCYTES NFR BLD: 21 % (ref 12–49)
MCH RBC QN AUTO: 32.4 PG (ref 26–34)
MCHC RBC AUTO-ENTMCNC: 32.9 G/DL (ref 30–36.5)
MCV RBC AUTO: 98.6 FL (ref 80–99)
MONOCYTES # BLD: 0.4 K/UL (ref 0–1)
MONOCYTES NFR BLD: 6 % (ref 5–13)
NEUTS SEG # BLD: 4.8 K/UL (ref 1.8–8)
NEUTS SEG NFR BLD: 65 % (ref 32–75)
NRBC # BLD: 0 K/UL (ref 0–0.01)
NRBC BLD-RTO: 0 PER 100 WBC
PLATELET # BLD AUTO: 218 K/UL (ref 150–400)
PMV BLD AUTO: 11.2 FL (ref 8.9–12.9)
POTASSIUM SERPL-SCNC: 4.3 MMOL/L (ref 3.5–5.1)
RBC # BLD AUTO: 2.81 M/UL (ref 4.1–5.7)
SODIUM SERPL-SCNC: 139 MMOL/L (ref 136–145)
WBC # BLD AUTO: 7.3 K/UL (ref 4.1–11.1)

## 2022-11-29 PROCEDURE — 85025 COMPLETE CBC W/AUTO DIFF WBC: CPT

## 2022-11-29 PROCEDURE — 74011000258 HC RX REV CODE- 258: Performed by: EMERGENCY MEDICINE

## 2022-11-29 PROCEDURE — 74011000250 HC RX REV CODE- 250: Performed by: STUDENT IN AN ORGANIZED HEALTH CARE EDUCATION/TRAINING PROGRAM

## 2022-11-29 PROCEDURE — 74011250636 HC RX REV CODE- 250/636: Performed by: STUDENT IN AN ORGANIZED HEALTH CARE EDUCATION/TRAINING PROGRAM

## 2022-11-29 PROCEDURE — 80048 BASIC METABOLIC PNL TOTAL CA: CPT

## 2022-11-29 PROCEDURE — C9113 INJ PANTOPRAZOLE SODIUM, VIA: HCPCS | Performed by: STUDENT IN AN ORGANIZED HEALTH CARE EDUCATION/TRAINING PROGRAM

## 2022-11-29 PROCEDURE — 74011250636 HC RX REV CODE- 250/636: Performed by: GENERAL ACUTE CARE HOSPITAL

## 2022-11-29 PROCEDURE — 99232 SBSQ HOSP IP/OBS MODERATE 35: CPT | Performed by: PSYCHIATRY & NEUROLOGY

## 2022-11-29 PROCEDURE — 74011250636 HC RX REV CODE- 250/636: Performed by: EMERGENCY MEDICINE

## 2022-11-29 PROCEDURE — 36415 COLL VENOUS BLD VENIPUNCTURE: CPT

## 2022-11-29 RX ORDER — PANTOPRAZOLE SODIUM 40 MG/1
40 GRANULE, DELAYED RELEASE ORAL DAILY
COMMUNITY

## 2022-11-29 RX ORDER — LEVETIRACETAM 1000 MG/1
1000 TABLET ORAL 2 TIMES DAILY
Qty: 60 TABLET | Refills: 0 | Status: SHIPPED | OUTPATIENT
Start: 2022-11-29

## 2022-11-29 RX ORDER — LANOLIN ALCOHOL/MO/W.PET/CERES
CREAM (GRAM) TOPICAL
COMMUNITY

## 2022-11-29 RX ORDER — LEVOFLOXACIN 500 MG/1
500 TABLET, FILM COATED ORAL DAILY
Qty: 3 TABLET | Refills: 0 | Status: SHIPPED | OUTPATIENT
Start: 2022-11-29 | End: 2022-12-02

## 2022-11-29 RX ORDER — GUAIFENESIN 100 MG/5ML
81 LIQUID (ML) ORAL DAILY
COMMUNITY

## 2022-11-29 RX ORDER — DONEPEZIL HYDROCHLORIDE 10 MG/1
10 TABLET, FILM COATED ORAL
COMMUNITY

## 2022-11-29 RX ORDER — FOLIC ACID 1 MG/1
TABLET ORAL DAILY
COMMUNITY

## 2022-11-29 RX ORDER — ISOSORBIDE DINITRATE 20 MG/1
20 TABLET ORAL 3 TIMES DAILY
COMMUNITY

## 2022-11-29 RX ORDER — CARVEDILOL 25 MG/1
25 TABLET ORAL 2 TIMES DAILY WITH MEALS
COMMUNITY

## 2022-11-29 RX ORDER — ATORVASTATIN CALCIUM 40 MG/1
40 TABLET, FILM COATED ORAL DAILY
COMMUNITY

## 2022-11-29 RX ORDER — VALPROIC ACID 250 MG/5ML
250 SOLUTION ORAL 3 TIMES DAILY
COMMUNITY
End: 2022-11-29

## 2022-11-29 RX ADMIN — LEVETIRACETAM 1000 MG: 100 INJECTION INTRAVENOUS at 14:35

## 2022-11-29 RX ADMIN — HYDRALAZINE HYDROCHLORIDE 10 MG: 20 INJECTION INTRAMUSCULAR; INTRAVENOUS at 03:42

## 2022-11-29 RX ADMIN — CEFTRIAXONE 1 G: 1 INJECTION, POWDER, FOR SOLUTION INTRAMUSCULAR; INTRAVENOUS at 14:35

## 2022-11-29 RX ADMIN — SODIUM CHLORIDE 40 MG: 9 INJECTION, SOLUTION INTRAMUSCULAR; INTRAVENOUS; SUBCUTANEOUS at 08:46

## 2022-11-29 RX ADMIN — LEVETIRACETAM 1000 MG: 100 INJECTION INTRAVENOUS at 01:57

## 2022-11-29 NOTE — PROGRESS NOTES
Transition of Care Plan:    RUR: 14% - Medium Risk  Disposition: Home with Win Maier  Follow up appointments:  PCP,Specialist  DME needed:  has wheelchair  Transportation at Discharge:  [facility] to provide transportation at 7:00pm.  [address] or means to access home:  Wife has access to home   IM Medicare Letter: 1st IM Letter was given on 11/27/22  Is patient a  and connected with the [address] ? N/A            If yes, was [place] transfer form completed and [facility] notified? N/A  Caregiver Contact: Wife- March Gens- [phone]  Discharge Caregiver contacted prior to discharge? CM discussed d/c plan with wife via phone. She verbalized understanding. Care Conference needed?:    n/a       Pt cleared for d/c from CM standpoint. Wife request CM to arrange transportation for 7:00pm due to wife having an appointment and no one will be home with pt. Referral sent to [facility] for [facility] services via [employer]. Care Management Interventions  PCP Verified by CM:  Yes  Mode of Transport at Discharge: BLS ([facility])  Transition of Care Consult (CM Consult): Discharge Planning, Home Health ([address])  Support Systems: Spouse/Significant Other, Child(donta)  Confirm Follow Up Transport: Family  Discharge Location  Patient Expects to be Discharged to[de-identified] Home with home health      [address]  Phone: [phone]

## 2022-11-29 NOTE — PROGRESS NOTES
Problem: Falls - Risk of  Goal: *Absence of Falls  Description: Document Yesy Litter Fall Risk and appropriate interventions in the flowsheet. Outcome: Progressing Towards Goal  Note: Fall Risk Interventions:       Mentation Interventions: Adequate sleep, hydration, pain control    Medication Interventions: Assess postural VS orthostatic hypotension, Bed/chair exit alarm    Elimination Interventions: Bed/chair exit alarm, Call light in reach              Problem: Pressure Injury - Risk of  Goal: *Prevention of pressure injury  Description: Document Hunter Scale and appropriate interventions in the flowsheet.   Outcome: Progressing Towards Goal  Note: Pressure Injury Interventions:  Sensory Interventions: Assess changes in LOC    Moisture Interventions: Absorbent underpads, Apply protective barrier, creams and emollients    Activity Interventions: Assess need for specialty bed    Mobility Interventions: Assess need for specialty bed, Float heels    Nutrition Interventions: Document food/fluid/supplement intake    Friction and Shear Interventions: Apply protective barrier, creams and emollients, Feet elevated on foot rest                Problem: Patient Education: Go to Patient Education Activity  Goal: Patient/Family Education  Outcome: Progressing Towards Goal

## 2022-11-29 NOTE — CONSULTS
Neurology Note    Patient ID:  Kay Morrell  [id number]  76 y.o.  1945      Date of Consultation:  November 29, 2022        Assessment and Plan:    The patient is a pleasant 68-year-old gentleman admitted to the hospital with a breakthrough seizure. His neurological examination reveals significant cognitive impairment, generalized quadriparesis with mild asymmetry. Breakthrough seizures:    associated with urinary tract infection reducing the patient's seizure threshold and not receiving seizure medication for close to 2 weeks  MRI with no evidence of an acute stroke however there is severe chronic microvascular ischemic disease and severe atrophy. Continue Keppra 1000 mg twice a day. Dementia:  Severe atrophy seen on brain imaging. Urinary tract infection producing worsening mentation. UTI: Started on antibiotics. Prior history of stroke:  Unless contraindicated, the patient should be on 81 mg aspirin    There is no other additional neurology recommendations at this time. If questions arise, please not hesitate to contact me and I will return to see the patient. The patient should follow-up in clinic in 3 to 4 weeks time after discharge. Subjective: no verbal output       History of Present Illness:   Kay Hernandez is a 76 y.o. male with a history of prior stroke with residual inability to ambulate, hypertension, dementia, and seizure disorder who presented to the hospital for a seizure. The history is from the patient's wife and son who are at the bedside today. The patient has had a difficult 2022 which included a prolonged hospitalization with COVID as well as having had a stroke. He has been in and out of the hospital, skilled nursing facility, rehabilitation. They are just moving to [place] from [place] and are currently living with his son.   After the patient's last discharge from rehabilitation, the patient was supposed to be on seizure medication but appears there has been difficulty in obtaining the medication and he has not been on his seizure medicine for approximately 2 weeks. The wife and son were not exactly sure on the amount of medication he was taking and the frequency of the medication. At baseline, the patient has been bedbound. He does recognize his wife by voice but has a rather severe dementia. He also does have generalized weakness but worse on the left side. He does need help with all of his activities of daily living. No further seizures since admission    Past Medical History:   Diagnosis Date    Cholesterolosis     Hypertension     Kidney disease     Seizure ([facility] [address]     Stroke ([facility] [address]         No pertinent past surgical history    No family history of neurodegenerative disease.       Social History     Tobacco Use    Smoking status: Per family , none    Smokeless tobacco: Not on file   Substance Use Topics    Alcohol use: Per family, none        No Known Allergies       Current Facility-Administered Medications   Medication Dose Route Frequency    cefTRIAXone (ROCEPHIN) 1 g in 0.9% sodium chloride (MBP/ADV) 50 mL MBP  1 g IntraVENous Q24H    acetaminophen (TYLENOL) tablet 650 mg  650 mg Oral Q6H PRN    ondansetron (ZOFRAN) injection 4 mg  4 mg IntraVENous Q4H PRN    pantoprazole (PROTONIX) 40 mg in 0.9% sodium chloride 10 mL injection  40 mg IntraVENous Q12H    albuterol-ipratropium (DUO-NEB) 2.5 MG-0.5 MG/3 ML  3 mL Nebulization Q6H PRN    hydrALAZINE (APRESOLINE) 20 mg/mL injection 10 mg  10 mg IntraVENous Q6H PRN    LORazepam (INTENSOL) 2 mg/mL oral concentrate 2 mg  2 mg Oral Q4H PRN    levETIRAcetam (KEPPRA) injection 1,000 mg  1,000 mg IntraVENous Q12H       Review of Systems:    [x]Unable to obtain  ROS due to  [x]mental status change  []sedated   []intubated    Objective:     Visit Vitals  /64   Pulse 87   Temp 98.5 °F (36.9 °C)   Resp 14   Ht 5' 7\" (1.702 m)   Wt 163 lb (73.9 kg)   SpO2 99%   BMI 25.53 kg/m²       Physical Exam:      General:  appears in no acute distress  Neck: no carotid bruits  Lungs: clear to auscultation  Heart:  no murmurs, regular rate  Lower extremity: peripheral pulses palpable and no edema  Skin: intact. There is a dry healing wound on his heel       Neurological exam:  The patient was minimally arousable. He will not follow any commands. There was no verbal output for me. Cranial nerves:   II-XII were tested    He does resist eye opening  Facial sensation:  normal and symmetric  Facial motor: Symmetric grimace to noxious stimulation  He would not protrude his tongue    Motor: Tone -increased in his left upper and lower extremity    No evidence of fasciculations    Strength testing: He would not participate with manual motor testing. There was spontaneous movement noted in his right upper extremity. He would not follow any commands with that extremity. He does grimace to noxious stimulation to deep painful stimulation in all 4 extremities. He does have a withdrawal in the right upper and lower extremity. Labs:     Lab Results   Component Value Date/Time    Sodium 139 11/29/2022 03:40 AM    Potassium 4.3 11/29/2022 03:40 AM    Chloride 111 (H) 11/29/2022 03:40 AM    Glucose 116 (H) 11/29/2022 03:40 AM    BUN 20 11/29/2022 03:40 AM    Creatinine 1.66 (H) 11/29/2022 03:40 AM    Calcium 8.3 (L) 11/29/2022 03:40 AM    WBC 7.3 11/29/2022 03:40 AM    HCT 27.7 (L) 11/29/2022 03:40 AM    HGB 9.1 (L) 11/29/2022 03:40 AM    PLATELET 631 74/30/2460 03:40 AM       Imaging:    Results from Hospital Encounter encounter on 11/27/22    MRI BRAIN WO CONT    Narrative  Clinical history: Weakness  INDICATION:   Weakness, seizures    COMPARISON: 11/27/2022  TECHNIQUE: MR examination of the brain includes axial and sagittal T1 , axial  T2, axial FLAIR, axial gradient echo, axial DWI, coronal T1 .  CONTRAST: FINDINGS:  There is no intracranial mass, hemorrhage or acute infarction.   There are confluent periventricular and scattered foci of increased T2 signal  intensity demonstrated in the corona radiata, centrum semiovale and central  [place]. There is extensive sulcal and ventricular prominence. There is no evidence of  midline shift or mass-effect. The ventricles are normal in size, position and  configuration. There are no extra-axial fluid collections. Major intracranial  vascular flow-voids are unremarkable. The orbits are grossly symmetric. Dural  venous sinuses are grossly unremarkable. There is no Chiari or syrinx. Pituitary  and infundibulum grossly unremarkable. Cerebellopontine angles are unremarkable. No skull base mass is identified. Cavernous sinuses are symmetric. The mastoid  air cells and are well pneumatized and clear. Impression  Severe chronic microvascular ischemic change and severe cerebral atrophy. No intracranial mass, hemorrhage or evidence of acute infarction. Results from [place] encounter on 11/27/22    CT HEAD WO CONT    Narrative  EXAM: Brain CT without contrast.    INDICATION: status epilepticus    TECHNIQUE: Noncontrast CT of the brain is performed with 5 mm collimation. Bone  algorithm axial and sagittal and coronal reconstructions performed and  evaluated. CT dose reduction was achieved through use of a standardized  protocol tailored for this examination and automatic exposure control for dose  modulation. COMPARISON: None    FINDINGS: There is no acute intracranial hemorrhage, mass, mass effect or  herniation. Ventricles and sulci show proportionate and symmetric prominence. There is periventricular white matter hypodensity. The gray-white matter  differentiation is well-preserved. Atherosclerotic calcifications are seen  within the carotid siphons and vertebral arteries. The mastoid air cells are  well pneumatized. The visualized paranasal sinuses are normal.    Impression  No acute intracranial hemorrhage, mass or infarct.  Cerebral atrophy  and white matter change most compatible with chronic small vessel ischemic  and/or senescent change. Intracranial atherosclerosis. head CT from November 27, 2022. There is no acute abnormalities. There was significant atrophy and chronic microvascular ischemic disease. I spent  30   minutes providing care to this  acutely ill inpatient with > 50% of the time counseling and assisting in the coordination of care of the patient on the patient's hospital floor/unit.            Active Problems:    Breakthrough seizure ([facility] [address] (11/27/2022)                 Signed By:  Milton Doty DO FAAN    November 29, 2022

## 2022-11-29 NOTE — PROGRESS NOTES
1900h: Bedside shift change report given to bernie dhillon (oncoming nurse) by Arcelia Higginbotham (offgoing nurse). Report included the following information SBAR, Kardex, Intake/Output, MAR, Recent Results, Med Rec Status, and Cardiac Rhythm SINUS RHYTHM . -sent TO MRI for diagnostics  2010H: came back from MRI. End of Shift Note    Bedside shift change report given to Afsaneh (oncoming nurse) by Nancy Powers RN (offgoing nurse). Report included the following information SBAR, Kardex, Intake/Output, MAR, Recent Results, Med Rec Status, and Cardiac Rhythm sinus rhythm    Shift worked:  1900h-0730h     Shift summary and any significant changes:     No episode of seizure noted. Patient had uneventful night     Concerns for physician to address:       Zone phone for oncoming shift:   N/a       Activity:  Activity Level: Bed Rest  Number times ambulated in hallways past shift: 0  Number of times OOB to chair past shift: 0    Cardiac:   Cardiac Monitoring: Yes      Cardiac Rhythm: Sinus Rhythm    Access:  Current line(s): PIV     Genitourinary:   Urinary status: voiding and external catheter    Respiratory:   O2 Device: None (Room air)  Chronic home O2 use?: NO  Incentive spirometer at bedside: NO       GI:  Last Bowel Movement Date: 11/28/22  Current diet:  ADULT DIET Dysphagia - Pureed  Passing flatus: YES  Tolerating current diet: YES       Pain Management:   Patient states pain is manageable on current regimen: YES    Skin:  Hunter Score: 9  Interventions: turn team, speciality bed, float heels, and PT/OT consult    Patient Safety:  Fall Score:  Total Score: 2  Interventions: bed/chair alarm, assistive device (walker, cane, etc), gripper socks, and pt to call before getting OOB  High Fall Risk: Yes    Length of Stay:  Expected LOS: - - -  Actual LOS: [facility], RN

## 2022-11-29 NOTE — PROGRESS NOTES
Bedside shift change report given to Madeleine Suarez RN (oncoming nurse) by Juan J Silva RN (offgoing nurse). Report included the following information SBAR, Kardex, ED Summary, Intake/Output, MAR, and Cardiac Rhythm sinus . Patient has discharge order- called CM about transport- CM will follow up. Discharge instructions discussed with wife- wife needs time of transport . Called CM to call wife. Bedside shift change report given to Derrek Dejesus RN (oncoming nurse) by Madeleine Suarez RN (offgoing nurse). Report included the following information SBAR, Kardex, ED Summary, Intake/Output, MAR, and Cardiac Rhythm sinus .

## 2022-11-30 NOTE — PROGRESS NOTES
1930 Bedside and Verbal shift change report given to Rica (oncoming nurse) by Sugar Merino (offgoing nurse). Report included the following information SBAR, Kardex, and Cardiac Rhythm Sinus Rhythm . 2030 Cannulas removed. Placed pt on a diaper. [address] [facility] arrived and came to collect pt. Wife Shauna Lew updated on .

## 2022-12-02 NOTE — PROGRESS NOTES
Physician Progress Note      PATIENT:               Luis A Carranza  [facility] #:                  [id number]  :                       1945  ADMIT DATE:       2022 1:43 PM  DISCH DATE:        2022 10:00 PM  RESPONDING  PROVIDER #:        Holland White MD          QUERY TEXT:    Pt is 75 y/o male noted to be bedbound with generalized weakness post stroke. If possible, please document in the progress notes and/or discharge summary if you are treating and/or evaluating any of the following: The medical record reflects the following:  Risk Factors: He is dependent for all of his activities of daily living. Per neurology \"His neurological examination reveals significant cognitive impairment, generalized quadriparesis with mild asymmetry\"    Clinical Indicators:  H&P: Hx Seizure disorder, CVA with residual left sided weakness, advanced dementia, HTN, HLD  RN flow sheets document Weakness LUE/LLE/RUE/RLE. RN flow sheets document unable to verbalize, speech slurred,  unequal R>L, LLE/RLE Rigid    Treatment: Admit, hospitalist consult, Neurology consult, Keppra, ativan, EEG, seizure precautions, neuro checks, PT/OT/Speech, cardiac monitoring, vitals per unit routine,    Functional quadriplegia (or quadriparesis) is defined as the complete inability to move due to severe disability or frailty caused by another medical condition without physical injury or damage to the brain or spinal cord. 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     Query created by: Shruthi Soler on 12/2/2022 9:30 AM      QUERY TEXT:    Pt admitted with Seizure. Pt noted to have prior CVA. If possible, please document in progress notes and discharge summary if you are evaluating and/or treating any of the following: The medical record reflects the following:  Risk Factors: 77 y/o male noted to be bedbound with generalized weakness post stroke  Hx Seizure disorder, CVA with residual left sided weakness, advanced dementia, HTN, HLD    Clinical Indicators: Patient was admitted with seizure and has a history of stroke. Patient was treated with Keppra. Treatment: Admit, hospitalist consult, Neurology consult, Keppra, Ativan, EEG, seizure precautions, neuro checks, PT/OT/Speech, cardiac monitoring, vitals per unit routine,  Options provided:  -- Seizure is a sequela of prior CVA  -- Seizure is not a sequela of prior CVA  -- Other - I will add my own diagnosis  -- Disagree - Not applicable / Not valid  -- Disagree - Clinically unable to determine / Unknown  -- Refer to Clinical Documentation Reviewer    PROVIDER RESPONSE TEXT:    Seizure is a sequela of prior CVA.     Query created by: Shruthi Soler on 12/2/2022 10:11 AM      Electronically signed by:  Deana Warren MD 12/2/2022 12:29 PM

## 2022-12-03 LAB
BACTERIA SPEC CULT: NORMAL
SERVICE CMNT-IMP: NORMAL

## 2022-12-13 ENCOUNTER — DOCUMENTATION ONLY (OUTPATIENT)
Dept: NEUROLOGY | Age: 77
End: 2022-12-13

## 2022-12-13 NOTE — PROGRESS NOTES
Called patient to schedule hospital follow up per Dr. Fauzia Hurley spoke with patient's wife stated patient is not able to be seen at this time will call back to schedule appointment when available

## 2022-12-18 ENCOUNTER — HOSPITAL ENCOUNTER (EMERGENCY)
Age: 77
Discharge: HOME | End: 2022-12-18
Attending: STUDENT IN AN ORGANIZED HEALTH CARE EDUCATION/TRAINING PROGRAM
Payer: MEDICARE

## 2022-12-18 ENCOUNTER — APPOINTMENT (OUTPATIENT)
Dept: GENERAL RADIOLOGY | Age: 77
End: 2022-12-18
Attending: STUDENT IN AN ORGANIZED HEALTH CARE EDUCATION/TRAINING PROGRAM
Payer: MEDICARE

## 2022-12-18 VITALS
HEART RATE: 54 BPM | WEIGHT: 163 LBS | OXYGEN SATURATION: 95 % | BODY MASS INDEX: 25.58 KG/M2 | DIASTOLIC BLOOD PRESSURE: 93 MMHG | HEIGHT: 67 IN | RESPIRATION RATE: 16 BRPM | TEMPERATURE: 97.9 F | SYSTOLIC BLOOD PRESSURE: 160 MMHG

## 2022-12-18 DIAGNOSIS — R11.11 VOMITING WITHOUT NAUSEA, UNSPECIFIED VOMITING TYPE: Primary | ICD-10-CM

## 2022-12-18 LAB
ALBUMIN SERPL-MCNC: 2.4 G/DL (ref 3.5–5)
ALBUMIN/GLOB SERPL: 0.5 {RATIO} (ref 1.1–2.2)
ALP SERPL-CCNC: 179 U/L (ref 45–117)
ALT SERPL-CCNC: 17 U/L (ref 12–78)
ANION GAP SERPL CALC-SCNC: 6 MMOL/L (ref 5–15)
AST SERPL-CCNC: 16 U/L (ref 15–37)
BASOPHILS # BLD: 0.1 K/UL (ref 0–0.1)
BASOPHILS NFR BLD: 1 % (ref 0–1)
BILIRUB SERPL-MCNC: 0.2 MG/DL (ref 0.2–1)
BUN SERPL-MCNC: 16 MG/DL (ref 6–20)
BUN/CREAT SERPL: 10 (ref 12–20)
CALCIUM SERPL-MCNC: 8.6 MG/DL (ref 8.5–10.1)
CHLORIDE SERPL-SCNC: 108 MMOL/L (ref 97–108)
CO2 SERPL-SCNC: 26 MMOL/L (ref 21–32)
CREAT SERPL-MCNC: 1.68 MG/DL (ref 0.7–1.3)
DIFFERENTIAL METHOD BLD: ABNORMAL
EOSINOPHIL # BLD: 0.3 K/UL (ref 0–0.4)
EOSINOPHIL NFR BLD: 3 % (ref 0–7)
ERYTHROCYTE [DISTWIDTH] IN BLOOD BY AUTOMATED COUNT: 13.8 % (ref 11.5–14.5)
GLOBULIN SER CALC-MCNC: 5.3 G/DL (ref 2–4)
GLUCOSE SERPL-MCNC: 160 MG/DL (ref 65–100)
HCT VFR BLD AUTO: 28.9 % (ref 36.6–50.3)
HGB BLD-MCNC: 9.4 G/DL (ref 12.1–17)
IMM GRANULOCYTES # BLD AUTO: 0 K/UL (ref 0–0.04)
IMM GRANULOCYTES NFR BLD AUTO: 0 % (ref 0–0.5)
LYMPHOCYTES # BLD: 2 K/UL (ref 0.8–3.5)
LYMPHOCYTES NFR BLD: 22 % (ref 12–49)
MCH RBC QN AUTO: 31.9 PG (ref 26–34)
MCHC RBC AUTO-ENTMCNC: 32.5 G/DL (ref 30–36.5)
MCV RBC AUTO: 98 FL (ref 80–99)
MONOCYTES # BLD: 0.5 K/UL (ref 0–1)
MONOCYTES NFR BLD: 6 % (ref 5–13)
NEUTS SEG # BLD: 6.2 K/UL (ref 1.8–8)
NEUTS SEG NFR BLD: 68 % (ref 32–75)
NRBC # BLD: 0 K/UL (ref 0–0.01)
NRBC BLD-RTO: 0 PER 100 WBC
PLATELET # BLD AUTO: 382 K/UL (ref 150–400)
PMV BLD AUTO: 10 FL (ref 8.9–12.9)
POTASSIUM SERPL-SCNC: 4.2 MMOL/L (ref 3.5–5.1)
PROT SERPL-MCNC: 7.7 G/DL (ref 6.4–8.2)
RBC # BLD AUTO: 2.95 M/UL (ref 4.1–5.7)
SODIUM SERPL-SCNC: 140 MMOL/L (ref 136–145)
TROPONIN-HIGH SENSITIVITY: 11 NG/L (ref 0–76)
WBC # BLD AUTO: 9.1 K/UL (ref 4.1–11.1)

## 2022-12-18 PROCEDURE — 99285 EMERGENCY DEPT VISIT HI MDM: CPT

## 2022-12-18 PROCEDURE — 71045 X-RAY EXAM CHEST 1 VIEW: CPT

## 2022-12-18 PROCEDURE — 36415 COLL VENOUS BLD VENIPUNCTURE: CPT

## 2022-12-18 PROCEDURE — 84484 ASSAY OF TROPONIN QUANT: CPT

## 2022-12-18 PROCEDURE — 85025 COMPLETE CBC W/AUTO DIFF WBC: CPT

## 2022-12-18 PROCEDURE — 80053 COMPREHEN METABOLIC PANEL: CPT

## 2022-12-18 PROCEDURE — 93005 ELECTROCARDIOGRAM TRACING: CPT

## 2022-12-18 NOTE — ED PROVIDER NOTES
EMERGENCY DEPARTMENT HISTORY AND PHYSICAL EXAM      Date: 12/18/2022  Patient Name: Trace Luna. History of Presenting Illness     Chief Complaint   Patient presents with    Aspiration     Patient arrives via EMS from home where family state he choked while eating and then vomited. Baseline nonverbal with L sided weakness/paralysis from previous CVA. Patient RR even non labored on arrival. BG en route 212. History Provided By: Patient    HPI: Trace Salazar, 76 y.o. male presents to the ED with cc of concern for aspiration. Family states that he choked while eating today and then vomited. Baseline left-sided weakness and paralysis from previous stroke. EMS reports blood sugar in route was 212. EMS reports patient's neurologic status is at baseline, he has baseline contractures and is nonverbal.  No recent illnesses. No fever, cough, shortness of breath per family. Hx is limited, patient is nonverbal     PCP: Keshia Rivera MD    No current facility-administered medications on file prior to encounter. Current Outpatient Medications on File Prior to Encounter   Medication Sig Dispense Refill    atorvastatin (LIPITOR) 40 mg tablet Take 40 mg by mouth daily. carvediloL (COREG) 25 mg tablet Take 25 mg by mouth two (2) times daily (with meals). isosorbide dinitrate (ISORDIL) 20 mg tablet Take 20 mg by mouth three (3) times daily. donepeziL (Aricept) 10 mg tablet Take 10 mg by mouth nightly. aspirin 81 mg chewable tablet Take 81 mg by mouth daily. pantoprazole (PROTONIX) 40 mg granules for oral suspension 40 mg daily. ferrous sulfate 325 mg (65 mg iron) tablet Take  by mouth Daily (before breakfast). folic acid (FOLVITE) 1 mg tablet Take  by mouth daily. levETIRAcetam (Keppra) 1,000 mg tablet Take 1 Tablet by mouth two (2) times a day.  60 Tablet 0       Past History     Past Medical History:  Past Medical History:   Diagnosis Date Cholesterolosis     Hypertension     Kidney disease     Seizure ([facility] [address]     Stroke [facility])        Past Surgical History:  No past surgical history on file. Family History:  No family history on file. Social History: Allergies:  No Known Allergies      Review of Systems   Review of Systems   Unable to perform ROS: Patient nonverbal     Physical Exam   Physical Exam  Vitals and nursing note reviewed. Constitutional:       Appearance: He is cachectic. HENT:      Head: Normocephalic and atraumatic. Eyes:      Conjunctiva/sclera: Conjunctivae normal.   Cardiovascular:      Rate and Rhythm: Normal rate and regular rhythm. Pulses: Normal pulses. Pulmonary:      Effort: Pulmonary effort is normal. No tachypnea or bradypnea. Breath sounds: Normal breath sounds. No decreased air movement. No decreased breath sounds. Chest:      Chest wall: No mass. Abdominal:      General: Abdomen is flat. Palpations: Abdomen is soft. Musculoskeletal:      Cervical back: Full passive range of motion without pain and neck supple. Skin:     General: Skin is warm. Neurological:      General: No focal deficit present. Mental Status: He is alert. Comments: Largely nonverbal, responds no when asked if he is in pain.   Contracted left upper and left lower extremity, otherwise moving extremities spontaneously, reported neurologic baseline, unable to assess coordination or sensation or cranial nerves due to baseline neurologic deficits   Psychiatric:         Mood and Affect: Mood normal.         Behavior: Behavior normal.       Diagnostic Study Results     Labs -     Recent Results (from the past 12 hour(s))   EKG, 12 LEAD, INITIAL    Collection Time: 12/18/22 12:23 PM   Result Value Ref Range    Ventricular Rate 49 BPM    Atrial Rate 49 BPM    P-R Interval 200 ms    QRS Duration 88 ms    Q-T Interval 422 ms    QTC Calculation (Bezet) 381 ms    Calculated P Axis 89 degrees    Calculated R Axis 47 degrees    Calculated T Axis 60 degrees    Diagnosis       Sinus bradycardia  Early repolarization  When compared with ECG of 28-NOV-2022 10:05,  No significant change was found     CBC WITH AUTOMATED DIFF    Collection Time: 12/18/22  1:20 PM   Result Value Ref Range    WBC 9.1 4.1 - 11.1 K/uL    RBC 2.95 (L) 4.10 - 5.70 M/uL    HGB 9.4 (L) 12.1 - 17.0 g/dL    HCT 28.9 (L) 36.6 - 50.3 %    MCV 98.0 80.0 - 99.0 FL    MCH 31.9 26.0 - 34.0 PG    MCHC 32.5 30.0 - 36.5 g/dL    RDW 13.8 11.5 - 14.5 %    PLATELET 706 286 - 149 K/uL    MPV 10.0 8.9 - 12.9 FL    NRBC 0.0 0  WBC    ABSOLUTE NRBC 0.00 0.00 - 0.01 K/uL    NEUTROPHILS 68 32 - 75 %    LYMPHOCYTES 22 12 - 49 %    MONOCYTES 6 5 - 13 %    EOSINOPHILS 3 0 - 7 %    BASOPHILS 1 0 - 1 %    IMMATURE GRANULOCYTES 0 0.0 - 0.5 %    ABS. NEUTROPHILS 6.2 1.8 - 8.0 K/UL    ABS. LYMPHOCYTES 2.0 0.8 - 3.5 K/UL    ABS. MONOCYTES 0.5 0.0 - 1.0 K/UL    ABS. EOSINOPHILS 0.3 0.0 - 0.4 K/UL    ABS. BASOPHILS 0.1 0.0 - 0.1 K/UL    ABS. IMM. GRANS. 0.0 0.00 - 0.04 K/UL    DF AUTOMATED     METABOLIC PANEL, COMPREHENSIVE    Collection Time: 12/18/22  1:20 PM   Result Value Ref Range    Sodium 140 136 - 145 mmol/L    Potassium 4.2 3.5 - 5.1 mmol/L    Chloride 108 97 - 108 mmol/L    CO2 26 21 - 32 mmol/L    Anion gap 6 5 - 15 mmol/L    Glucose 160 (H) 65 - 100 mg/dL    BUN 16 6 - 20 MG/DL    Creatinine 1.68 (H) 0.70 - 1.30 MG/DL    BUN/Creatinine ratio 10 (L) 12 - 20      eGFR 41 (L) >60 ml/min/1.73m2    Calcium 8.6 8.5 - 10.1 MG/DL    Bilirubin, total 0.2 0.2 - 1.0 MG/DL    ALT (SGPT) 17 12 - 78 U/L    AST (SGOT) 16 15 - 37 U/L    Alk.  phosphatase 179 (H) 45 - 117 U/L    Protein, total 7.7 6.4 - 8.2 g/dL    Albumin 2.4 (L) 3.5 - 5.0 g/dL    Globulin 5.3 (H) 2.0 - 4.0 g/dL    A-G Ratio 0.5 (L) 1.1 - 2.2     TROPONIN-HIGH SENSITIVITY    Collection Time: 12/18/22  1:20 PM   Result Value Ref Range    Troponin-High Sensitivity 11 0 - 76 ng/L       Radiologic Studies -   XR CHEST PORT Final Result   No acute process identified. CT Results  (Last 48 hours)      None          CXR Results  (Last 48 hours)                 12/18/22 1346  XR CHEST PORT Final result    Impression:  No acute process identified. Narrative:  Clinical history: aspiration   INDICATION:   aspiration   COMPARISON: 11/27/2022       FINDINGS:   AP portable upright view of the chest demonstrates a stable  cardiopericardial   silhouette. There is no pleural effusion. .There is no focal consolidation. .There   is no pneumothorax. .                    Medical Decision Making   I am the first provider for this patient. I reviewed the vital signs, available nursing notes, past medical history, past surgical history, family history and social history. Vital Signs-Reviewed the patient's vital signs. Patient Vitals for the past 12 hrs:   Temp Pulse Resp BP SpO2   12/18/22 1537 -- -- -- (!) 160/93 --   12/18/22 1507 -- -- -- -- 95 %   12/18/22 1236 97.9 °F (36.6 °C) (!) 54 16 111/72 100 %       EKG interpretation: (Preliminary)  Sinus bradycardia rate: 49, VA: Normal, QRS: Normal, no significant ST elevations or depressions    Records Reviewed: Nursing Notes, Previous electrocardiograms, Previous Radiology Studies, and Previous Laboratory Studies    Provider Notes (Medical Decision Making):   Patient presenting to the emergency department with chief complaint of aspiration. On exam he is hemodynamically stable, vital signs stable, no abnormal lung sounds auscultated. Rentals include aspiration pneumonitis versus pneumonia versus other abnormality versus anemia. Consider CVA however history does not seem consistent. Consider seizure although no reported shaking by family. Lower suspicion for intra-abdominal process given benign exam and onset of vomiting after choking.   No alternative caregivers with family at bedside to corroborate story, basic labs, chest x-ray, discuss further with family regarding history of today. ED Course:   Initial assessment performed. The patients presenting problems have been discussed, and they are in agreement with the care plan formulated and outlined with them. I have encouraged them to ask questions as they arise throughout their visit. Family at bedside, stated that patient ate and then became hot, coughed and then vomited. She states that he was not unresponsive after the episode, he is acting normally now. This was some time after he had eaten. She stated that sometimes she feels as if he overheats and he improved after she placed a cold towel on his forehead. She denies any other concerns or any recent illnesses. States he has been otherwise well. He eats pureed foods. Labs, work up negative, discussed plan of care for discharge home with outpatient follow up. If he experiences any further episodes, develops fever or any other concerning symptoms to return to ER. Disposition:    DC- Adult Discharges: All of the diagnostic tests were reviewed and questions answered. Diagnosis, care plan and treatment options were discussed. The patient understands the instructions and will follow up as directed. The patients results have been reviewed with them. They have been counseled regarding their diagnosis. The patient verbally convey understanding and agreement of the signs, symptoms, diagnosis, treatment and prognosis and additionally agrees to follow up as recommended with their PCP in 24 - 48 hours. They also agree with the care-plan and convey that all of their questions have been answered. I have also put together some discharge instructions for them that include: 1) educational information regarding their diagnosis, 2) how to care for their diagnosis at home, as well a 3) list of reasons why they would want to return to the ED prior to their follow-up appointment, should their condition change.   DC-The spouse was given verbal follow-up instructions      DISCHARGE PLAN:  1. Discharge Medication List as of 12/18/2022  4:05 PM        2. Follow-up Information       Follow up With Specialties Details Why Contact Info    [facility] EMERGENCY DEPT Emergency Medicine  If symptoms worsen [address] [place] [address]  [phone]    Paresh Mckenzie MD General Practice Schedule an appointment as soon as possible for a visit in 3 days  [address]  [phone]            3. Return to ED if worse     Diagnosis     Clinical Impression:   1. Vomiting without nausea, unspecified vomiting type        Attestations:    Horacio Crowell, DO        Please note that this dictation was completed with HDF, the computer voice recognition software. Quite often unanticipated grammatical, syntax, homophones, and other interpretive errors are inadvertently transcribed by the computer software. Please disregard these errors. Please excuse any errors that have escaped final proofreading. Thank you.

## 2022-12-18 NOTE — DISCHARGE INSTRUCTIONS
If your symptoms change or worsen, return to the ER as soon as possible. Recent Results (from the past 12 hour(s))   EKG, 12 LEAD, INITIAL    Collection Time: 12/18/22 12:23 PM   Result Value Ref Range    Ventricular Rate 49 BPM    Atrial Rate 49 BPM    P-R Interval 200 ms    QRS Duration 88 ms    Q-T Interval 422 ms    QTC Calculation (Bezet) 381 ms    Calculated P Axis 89 degrees    Calculated R Axis 47 degrees    Calculated T Axis 60 degrees    Diagnosis       Sinus bradycardia  Early repolarization  When compared with ECG of 28-NOV-2022 10:05,  No significant change was found     CBC WITH AUTOMATED DIFF    Collection Time: 12/18/22  1:20 PM   Result Value Ref Range    WBC 9.1 4.1 - 11.1 K/uL    RBC 2.95 (L) 4.10 - 5.70 M/uL    HGB 9.4 (L) 12.1 - 17.0 g/dL    HCT 28.9 (L) 36.6 - 50.3 %    MCV 98.0 80.0 - 99.0 FL    MCH 31.9 26.0 - 34.0 PG    MCHC 32.5 30.0 - 36.5 g/dL    RDW 13.8 11.5 - 14.5 %    PLATELET 629 967 - 810 K/uL    MPV 10.0 8.9 - 12.9 FL    NRBC 0.0 0  WBC    ABSOLUTE NRBC 0.00 0.00 - 0.01 K/uL    NEUTROPHILS 68 32 - 75 %    LYMPHOCYTES 22 12 - 49 %    MONOCYTES 6 5 - 13 %    EOSINOPHILS 3 0 - 7 %    BASOPHILS 1 0 - 1 %    IMMATURE GRANULOCYTES 0 0.0 - 0.5 %    ABS. NEUTROPHILS 6.2 1.8 - 8.0 K/UL    ABS. LYMPHOCYTES 2.0 0.8 - 3.5 K/UL    ABS. MONOCYTES 0.5 0.0 - 1.0 K/UL    ABS. EOSINOPHILS 0.3 0.0 - 0.4 K/UL    ABS. BASOPHILS 0.1 0.0 - 0.1 K/UL    ABS. IMM.  GRANS. 0.0 0.00 - 0.04 K/UL    DF AUTOMATED     METABOLIC PANEL, COMPREHENSIVE    Collection Time: 12/18/22  1:20 PM   Result Value Ref Range    Sodium 140 136 - 145 mmol/L    Potassium 4.2 3.5 - 5.1 mmol/L    Chloride 108 97 - 108 mmol/L    CO2 26 21 - 32 mmol/L    Anion gap 6 5 - 15 mmol/L    Glucose 160 (H) 65 - 100 mg/dL    BUN 16 6 - 20 MG/DL    Creatinine 1.68 (H) 0.70 - 1.30 MG/DL    BUN/Creatinine ratio 10 (L) 12 - 20      eGFR 41 (L) >60 ml/min/1.73m2    Calcium 8.6 8.5 - 10.1 MG/DL    Bilirubin, total 0.2 0.2 - 1.0 MG/DL ALT (SGPT) 17 12 - 78 U/L    AST (SGOT) 16 15 - 37 U/L    Alk.  phosphatase 179 (H) 45 - 117 U/L    Protein, total 7.7 6.4 - 8.2 g/dL    Albumin 2.4 (L) 3.5 - 5.0 g/dL    Globulin 5.3 (H) 2.0 - 4.0 g/dL    A-G Ratio 0.5 (L) 1.1 - 2.2     TROPONIN-HIGH SENSITIVITY    Collection Time: 12/18/22  1:20 PM   Result Value Ref Range    Troponin-High Sensitivity 11 0 - 76 ng/L       Please return if any additional symptoms return

## 2022-12-18 NOTE — ED NOTES
Family at bedside: state patient was sitting in wheelchair \"sometime after eating\" and became sweaty and pale, then vomited.

## 2022-12-19 LAB
ATRIAL RATE: 49 BPM
CALCULATED P AXIS, ECG09: 89 DEGREES
CALCULATED R AXIS, ECG10: 47 DEGREES
CALCULATED T AXIS, ECG11: 60 DEGREES
DIAGNOSIS, 93000: NORMAL
P-R INTERVAL, ECG05: 200 MS
Q-T INTERVAL, ECG07: 422 MS
QRS DURATION, ECG06: 88 MS
QTC CALCULATION (BEZET), ECG08: 381 MS
VENTRICULAR RATE, ECG03: 49 BPM

## 2023-02-15 ENCOUNTER — TELEPHONE (OUTPATIENT)
Dept: FAMILY MEDICINE CLINIC | Age: 78
End: 2023-02-15

## 2023-02-21 NOTE — TELEPHONE ENCOUNTER
[address] [place] [address]  Phone:  [phone]        Fax:  [phone]     Patient:  Mike Rojas. YOB: 1945     Incoming call from patient's mother/caregiver Marisol Randolph. She is requesting [facility] for her  Mike Rojas. She states that they moved to [place] from [place] about 6 months ago. Her  established care with [facility] (Dr. Azucena Curry). Ms. Vishal Singleton says her son also needs a PCP that makes home visits and [facility] does not accept son's Medicaid so she is hoping to get [facility] to see both her son and . She would like for them to have the same provider. Preliminary Intake Note:      Address:   [facility] [address] [place] [address]     Does patient live within 15 miles of [address] [place] [address] at address listed above?      yes       Distance from [address] [place] [address]/Travel Time:   8.2 miles/ 19 minutes  Region:   [place] [address]:   Medicare A&B with [employer] supplement    Does patient qualify based on address and insurance? yes     Date of Referral:  2/15/23     Reason for Referral:   Wife would like for  and son to have same PCP     Diagnosis:   hx of CVA, hx seizures, dementia     Last PCP visit:   Dr. Jian Barber, about a month ago     Last Hospitalization:   11/27/22-11/29/22  [id number] (seizure)     Nursing Home/Rehab stay in the past year? no     Primary Caregiver:   Marisol Randolph  Relation to Patient:   mother  Contact Information:   [phone]     Records Needed:   yes, Authorization to Release Health Information form sent to mother via [employer]. Email [email or web address]     Current Controlled Substances:   none per         This nurse explained that the [address] [place] [address] team discusses new referrals at our weekly IDG meetings. The next meeting is scheduled for 2/21/23. This nurse will present the referral to the [address] [place] [address] team on 2/21/23.  Acceptance into the [facility] practice is determined by the [facility] providers. This nurse or SW will call Ms. Vance back to notify her of the decision. Nurse informed MsLisbet Severiano Elder that if patient is accepted for [facility],  [facility] providers may make scheduled home visits between the hours of 8:30am and 5:00pm.   Special appointment time requests (such as no morning appts) are not able to be honored due to the fact that visits are scheduled to optimize the travel route of the provider. We inform patient/family in advance of date of visit and window of arrival time (morning or afternoon). We do not give exact appointment times. Some visits may be Virtual Visits.  will call to arrange a visit prior to provider's first visit for signing of new patient paperwork and review of Emergency Action Plan booklet. She voices understanding.

## 2023-02-28 ENCOUNTER — TELEPHONE (OUTPATIENT)
Dept: FAMILY MEDICINE CLINIC | Age: 78
End: 2023-02-28

## 2023-02-28 NOTE — TELEPHONE ENCOUNTER
LCSW called and spoke with pt's spouse, Irma Lr, to inform [facility] on 3/[facility]/23 with Mary Grace Felton NP. Spouse inquired if there were any sooner appointments. LCSW informed that 3/22/23 was the next available appointment. She expressed understanding. LCSW scheduled in-home appointment for initial social work assessment and completion of intake paperwork on 3/22/23 @ 1:00pm. She said she signs on behalf of her pt, who has dementia.      LORY Lopez, [facility]    [place] Based [address] [place] [address]  (v) [phone] [id number]

## 2023-03-22 ENCOUNTER — OFFICE VISIT (OUTPATIENT)
Dept: FAMILY MEDICINE CLINIC | Age: 78
End: 2023-03-22

## 2023-03-22 DIAGNOSIS — Z76.89 ENCOUNTER FOR SOCIAL WORK INTERVENTION: Primary | ICD-10-CM

## 2023-03-22 NOTE — PROGRESS NOTES
Home Based Primary Care   [phone]  Initial Social Work Assessment    Date and Time of Initial In-Home Visit: 3/22/23, 12:30pm    Reason for Assessment: Completion of [facility] intake paperwork, initial psychosocial assessment    Sources of Information: Pt's spouse, Carlos Shell    SOCIAL HISTORY  Relationship Status:   [] Single  [x] :  for 54 years, 2 sons Aide Solomon and Grand chisholm)  [] Significant Other  []   [] /  [] Other:     Living Circumstances: Pt lives in one-level rental home with his wife and son, Tootie Blackwell    Current/Type of Housing:  [] Public/subsidized housing  [] Apartment, Is there an elevator:   [] Assisted Living  [x] [employer], How many floors: 1    FINANCIAL/INSURANCE  Source of Income:    [x] Social Security   [] SSI   [x] Pension   [] Employment Income   [] [address]:   [x] Medicare   [] Medicaid   [] [employer]   [] Other:     Are you having trouble paying for things like rent, food, medications? Not at this time    Is there an agency or person who pays your bills? If yes, who? Pt's spouse manages finances    [address]: No concerns at this time. Pt's son, Grand chisholm, lives 10 minutes away and can bring groceries to the home. Pt's spouse can drive but her cars are still at their home that they own in [facility]). They moved to [place] in the summer of 2022 to be closer to [place], and lived with him for 3 months before moving to this rental home in November 2022. Problem with bed bugs, odors, pests, or pets? Not at this time    Working smoke alarm? [x] Yes [] No    Difficulty getting to exits from the home? Yes    Firearm Assessment:   Are there firearms in the home? [] Yes, Where are they kept? [x] No      SOCIAL SUPPORT ENVIRONMENT  Support System: Pt's spouse and 2 sons are primary support    How often do social supports visit? Daily, pt lives with spouse and 1 son, Tootie Blackwell. How do you socialize? When people come to visit    Are you involved with any community agencies? Name/Contact: Not at this time, pt had been seen by [facility] practice prior to this transition to [address] [place] [address]    Is or has Adult Protective Services ever been involved? No    In the last 6 months, have you seen a ? Psychiatrist? If yes, they be contacted by [address] [place] [address] team? No    Substance Use:   Tobacco? [] Yes [x] No    Alcohol? [] Yes, How many drinks per week: [x] No   Drugs? [] Yes, which drugs:   [x] No    Do you have an Emergency Call Device system? [] Yes, Which brand? [x] No, Are you interested in obtaining and subscribing to an Emergency Call Device system? No, pt is bedbound, does not attempt to get out of bed    COGNITIVE/EMOTIONAL   Mental Status: Pt was awake but unable to engage in meaningful conversation with LCSW today    How is your memory? Unable to assess    In the last 6 months, has anyone told you that you are forgetful? Unable to assess    Do you receive help with ADLs? [x] Yes, Who helps you? How many hours/days? Pt's wife provides assistance with ADLs  [] No, Do you need help? TRANSPORTATION NEEDS ASSESSMENT  Can you use public transportation? [] Yes [x] No  Do you use transportation services provided by: Managed Care Organization? Community Service Resource? No    EMERGENCY ACTION PLAN  VALDEMAR reviewed the Emergency Action Plan with pt and/or family during this initial in-home Social Work assessment. VALDEMAR completed Emergency Numbers, reviewed the content areas of Power Loss, Natural Disasters, Clinical Emergencies, Severe Weather, Safety in the Home, and Infection Control. Patient's acuity value consider to be HIGH. ADVANCED CARE PLANNING  ACP conversation not initiated during today's initial social work assessment    CAREGIVER BURDEN ASSESSMENT  Does the caregiver feel confident administering medication? Yes  Does the caregiver need any help connecting with community resources?  Not at this time  Does the caregiver feel confident assisting with activities of daily living? Yes      Narrative:  SW visited with pt and spouse in the home. New patient assessment of psychosocial needs and social determinants of health completed. SW introduced Home Based Primary Care and Supportive Services and reviewed Emergency Action Plan booklet. Pt was resting in bed when LCSW arrived. He was awake, but he was unable to engage in conversation due to cognitive limitations. He smiled pleasantly, appeared comfortable and well groomed. LCSW sat with pt's spouse, Nevin Iverson, at the kitchen table to discuss psychosocial history. Pt worked most of his career with the [employer]. He retired when he \"got sick\" in the early 2000's. He rec's Social Security income, a small nursing home from [employer], and a small stipend from [employer]. His insurance ([address]) is through [employer]. Nevin Iverson stated that pt enjoyed his work, and people he worked with enjoyed him as well. Pt and spouse are originally from [place], [place], but had been living in [facility]) for the past 50+ years. They moved to [place] in 2022 to be closer to their son, Lauri Hubbard. Pt has another son, Mala Stratton, who had a stroke and has cognitive limitations of his own. Lauri Hubbard lives 10 minutes away. Pt and spouse still own their home in [place]. Nevin Iverson stated that she plans on selling the home in the next few months. She has 2 vehicles that are still at their house in [place]. She believes her son Lauri Hubbard is going to arrange for at least one of the vehicles to be brought down to [place], as she has no vehicle at this time. LCSW to remain available for support and resources as needed.      Plan:   [] Establish therapeutic relationship through in home visits and telephonic touch points  [] Assist in optimizing levels of psychosocial function  [] Assess safety concerns and address as appropriate  [] Assess for caregiver burden and intervene as psychosocially indicated  [] Assess patient for caregiver needs to optimize psychosocial function and safety  [] Provide information on available community resources  [] Initiate conversation regarding health care wishes   [] Assess current Advance Care Planning documents and make ACP recommendations as appropriate  [] Discuss goals of care and treatment preferences  [] Screen for mental health conditions including but not limited to anxiety, depression, and anticipatory grief  [] Offer counseling services for mental health conditions including but not limited to anxiety, depression, and anticipatory grief  [] Engage family in patient health care goals to ensure support for those goals and minimize patient/family conflict  [] Assess cultural needs of patient/family, educate interdisciplinary team and engage appropriate resources    Frequency of Social Work Follow-Up/Visits  [] As needed  [] Bi-monthly  [] Monthly  [] Weekly  [] Other:    LORY Light, [facility]   Home Based [address] [place] [address]  [phone] [id number]

## 2023-03-23 ENCOUNTER — DOCUMENTATION ONLY (OUTPATIENT)
Dept: FAMILY MEDICINE CLINIC | Age: 78
End: 2023-03-23

## 2023-03-23 ENCOUNTER — HOME VISIT (OUTPATIENT)
Dept: FAMILY MEDICINE CLINIC | Age: 78
End: 2023-03-23
Payer: MEDICARE

## 2023-03-23 VITALS
HEART RATE: 87 BPM | DIASTOLIC BLOOD PRESSURE: 82 MMHG | SYSTOLIC BLOOD PRESSURE: 132 MMHG | RESPIRATION RATE: 14 BRPM | OXYGEN SATURATION: 97 %

## 2023-03-23 DIAGNOSIS — F03.C0 SEVERE DEMENTIA WITHOUT BEHAVIORAL DISTURBANCE, PSYCHOTIC DISTURBANCE, MOOD DISTURBANCE, OR ANXIETY, UNSPECIFIED DEMENTIA TYPE (HCC): Primary | ICD-10-CM

## 2023-03-23 DIAGNOSIS — L89.152 PRESSURE INJURY OF SACRAL REGION, STAGE 2 (HCC): ICD-10-CM

## 2023-03-23 DIAGNOSIS — E78.2 MIXED HYPERLIPIDEMIA: ICD-10-CM

## 2023-03-23 DIAGNOSIS — F03.C0 SEVERE DEMENTIA WITHOUT BEHAVIORAL DISTURBANCE, PSYCHOTIC DISTURBANCE, MOOD DISTURBANCE, OR ANXIETY, UNSPECIFIED DEMENTIA TYPE (HCC): ICD-10-CM

## 2023-03-23 DIAGNOSIS — R73.9 HYPERGLYCEMIA: ICD-10-CM

## 2023-03-23 DIAGNOSIS — R13.10 DYSPHAGIA, UNSPECIFIED TYPE: ICD-10-CM

## 2023-03-23 DIAGNOSIS — I10 PRIMARY HYPERTENSION: ICD-10-CM

## 2023-03-23 DIAGNOSIS — D64.9 NORMOCYTIC ANEMIA: ICD-10-CM

## 2023-03-23 DIAGNOSIS — Z00.00 ENCOUNTER FOR SUBSEQUENT ANNUAL WELLNESS VISIT IN MEDICARE PATIENT: ICD-10-CM

## 2023-03-23 DIAGNOSIS — K21.9 GASTROESOPHAGEAL REFLUX DISEASE WITHOUT ESOPHAGITIS: ICD-10-CM

## 2023-03-23 DIAGNOSIS — Z11.59 NEED FOR HEPATITIS C SCREENING TEST: ICD-10-CM

## 2023-03-23 DIAGNOSIS — Z86.73 HISTORY OF STROKE: ICD-10-CM

## 2023-03-23 DIAGNOSIS — G40.909 SEIZURE DISORDER (HCC): ICD-10-CM

## 2023-03-23 PROBLEM — G40.919 BREAKTHROUGH SEIZURE (HCC): Status: RESOLVED | Noted: 2022-11-27 | Resolved: 2023-03-23

## 2023-03-23 PROCEDURE — 1123F ACP DISCUSS/DSCN MKR DOCD: CPT | Performed by: NURSE PRACTITIONER

## 2023-03-23 PROCEDURE — 99344 HOME/RES VST NEW MOD MDM 60: CPT | Performed by: NURSE PRACTITIONER

## 2023-03-23 PROCEDURE — 3075F SYST BP GE 130 - 139MM HG: CPT | Performed by: NURSE PRACTITIONER

## 2023-03-23 PROCEDURE — 3079F DIAST BP 80-89 MM HG: CPT | Performed by: NURSE PRACTITIONER

## 2023-03-23 PROCEDURE — G0439 PPPS, SUBSEQ VISIT: HCPCS | Performed by: NURSE PRACTITIONER

## 2023-03-23 RX ORDER — ISOSORBIDE DINITRATE 20 MG/1
20 TABLET ORAL 3 TIMES DAILY
Qty: 270 TABLET | Refills: 1 | Status: SHIPPED | OUTPATIENT
Start: 2023-03-23

## 2023-03-23 RX ORDER — ATORVASTATIN CALCIUM 40 MG/1
40 TABLET, FILM COATED ORAL DAILY
Qty: 90 TABLET | Refills: 1 | Status: SHIPPED | OUTPATIENT
Start: 2023-03-23

## 2023-03-23 RX ORDER — CARVEDILOL 25 MG/1
25 TABLET ORAL 2 TIMES DAILY WITH MEALS
Qty: 180 TABLET | Refills: 1 | Status: SHIPPED | OUTPATIENT
Start: 2023-03-23

## 2023-03-23 RX ORDER — LEVETIRACETAM 100 MG/ML
SOLUTION ORAL
Qty: 473 ML | Refills: 2 | Status: SHIPPED | OUTPATIENT
Start: 2023-03-23

## 2023-03-23 RX ORDER — LANOLIN ALCOHOL/MO/W.PET/CERES
325 CREAM (GRAM) TOPICAL
Qty: 90 TABLET | Refills: 1 | Status: SHIPPED | OUTPATIENT
Start: 2023-03-23

## 2023-03-23 RX ORDER — FOLIC ACID 1 MG/1
1 TABLET ORAL DAILY
Qty: 90 TABLET | Refills: 1 | Status: SHIPPED | OUTPATIENT
Start: 2023-03-23

## 2023-03-23 RX ORDER — LANOLIN ALCOHOL/MO/W.PET/CERES
1000 CREAM (GRAM) TOPICAL DAILY
COMMUNITY

## 2023-03-23 RX ORDER — PANTOPRAZOLE SODIUM 40 MG/1
40 GRANULE, DELAYED RELEASE ORAL DAILY
Qty: 90 EACH | Refills: 1 | Status: SHIPPED | OUTPATIENT
Start: 2023-03-23

## 2023-03-23 NOTE — PROGRESS NOTES
Home Based [address] [facility]   [id number]          NOTE: Home Based Primary Care is a PROVIDER (MD/NP) based interdisciplinary practice (RN, LCSW, Pharmacy, Dietician) for patients who cannot access (or have a taxing effort) primary / specialty medical care in an office setting. [facility] is NOT [facility] but works with [facility] when there is a skilled need. Our MD/NPs are integral in Care Transitions; PLEASE CALL [phone] if this patient arrives in the Emergency Department or Hospital.         Date of Current Visit: 3/23/2023     SUMMARY     Marija Tee is a 76 y.o. male seen in the home today due to taxing effort to seek primary care services in the community s/t advanced dementia, bedbound status s/t stroke    Patient/Family present for Current Visit:  Patient, wife Robyn Ndiaye, son Leonora Sosa of Care as stated by the patient/family is: Improve current functional status, would want hospitalization if indicated     ASSESSMENT AND PLAN       ICD-10-CM ICD-9-CM    1. Severe dementia without behavioral disturbance, psychotic disturbance, mood disturbance, or anxiety, unspecified dementia type  F03. C0 294.20 CBC WITH AUTOMATED DIFF      METABOLIC PANEL, COMPREHENSIVE      2. History of stroke  Z86.73 V12.54       3. Primary hypertension  I10 401.9 CBC WITH AUTOMATED DIFF      METABOLIC PANEL, COMPREHENSIVE      4. Seizure disorder ([facility] [address]  G40.909 345.90       5. Mixed hyperlipidemia  E78.2 272.2 LIPID PANEL      6. Normocytic anemia  D64.9 285.9 IRON PROFILE      FERRITIN      7. Gastroesophageal reflux disease without esophagitis  K21.9 530.81       8. Dysphagia, unspecified type  R13.10 787.20       9. Pressure injury of sacral region, stage 2 (HCC)  L89.152 707.03      707.22       10. Need for hepatitis C screening test  Z11.59 V73.89 HEPATITIS C AB      11. Hyperglycemia  R73.9 790.29 HEMOGLOBIN A1C WITH EAG      12.  Encounter for subsequent annual wellness visit in Medicare patient  Z00.00 V70.0         -Dementia: Likely with vascular component given history of multiple strokes. MRI 11/2022 significant for \"Severe chronic microvascular ischemic change and severe cerebral atrophy. \"  Patient says only a few words, states \"yes\" or \"ok\" to questions asked but does not seem related to question. Does not follow commands, very little meaningful movement, reliant on family for total care. Previously saw neurologist Tere Valladares MD in [place] but does not have neurologist in [place] and wife does not believe it would be realistic for him to attend outpatient appointments. Previously managed on donepezil and memantine which he is no longer taking.    -History of CVA: Per wife, two prior strokes with most significant about 4 years ago. She describes activity like TIAs since that time. Per chart review, also had stroke in 2022 during series of hospitalization and rehabilitation events (also with Covid-19 at the time). Has residual L-sided spastic hemiplegia and dysphagia. Currently on aspirin 81mg, atorvastatin 40mg. Wife requesting PT due to increased weakness and stiffness- will consult at this time. -HTN: BP at goal during visit today on current therapy with isosorbide, carvedilol. CBC, CMP to be attempted by team nurse at visit tomorrow.  -Seizure disorder: Secondary to stroke. Last known seizure activity Nov 28, 2022 with subsequent hospitalization. Seizure occurred in setting of UTI and patient had not had medication for about 2 weeks. Seen by Dr. Ko Bridges, neurologist, during hospitalization and he is currently managed on Keppra 500mg BID (liquid). No longer followed by neurologist as he is unable to leave the home for outpatient appointments.    -Anemia: Last H/H in Dec 2022 was 9.4/28.9, normocytic normochromic. He is currently taking Y33, folic acid, and ferrous sulfate daily.   No recent bleeding events, taking aspirin 81mg daily for secondary stroke prevention and pantoprazole for GI protection. Will attempt CBC repeat with nurse visit tomorrow. -GERD: Currently managed on pantoprazole. Also with dysphagia and increased oral secretions, high risk for aspiration. Seated upright for all meals- continue current therapy.   -Dysphagia: Seen by SLP during hospitalization 11/18/22 for bedside swallow exam.  Noted to have moderate oral dysphagia, recommended pureed diet with thin liquids, supervision for all intake and sit upright for all meals. Last ED visit 12/18/22 for vomiting/concern for aspiration pneumonia. CXR negative at that time. Wife giving pureed diet, medications crushed in food. She describes advancing his diet, team nurse counseled to continue to puree as he is high risk for aspiration pneumonia. Wife notes some sputum and worsening secretions overnight. Will consult SLP for further evaluation and treatment.   -Stage II PU: Improving per wife, surrounding pink scar tissue, wound bed with granulation tissue. Wife is applying Vaseline to buttocks and sacrum. Counseled to apply cream with Zinc for additional protection. Continue offloading, encourage protein sources in diet, prompt changing of briefs and incontinence care to keep skin clean/dry. Will consult SN  for additional management. Skin of heels intact bilaterally. -Medicare Wellness Visit: completed today (see separate note for details)     -Labs: CBC, CMP, A1C, Lipid panel, Iron, Ferritin, Hep C screen to be attempted by nurse 3/24/23  -AMD/Code status: Wife working to   -Follow up: nurse visit tomorrow to attempt labs, follow-up with NP in 5 weeks (5/3/23). Will need AMD discussion.        Health Maintenance Due   Topic Date Due    Hepatitis C Screening  Never done    Depression Screen  Never done    COVID-19 Vaccine (1) Never done    Lipid Screen  Never done    DTaP/Tdap/Td series (1 - Tdap) Never done    Shingles Vaccine (1 of 2) Never done    Pneumococcal 65+ years (1 - PCV) Never done    Flu Vaccine (1) Never done    Medicare Yearly Exam  Never done     I have left a SUMMARY / INSTRUCTIONS from today's visit with the patient/family in the home        HISTORY:      CHIEF COMPLAINT:    Chief Complaint   Patient presents with    Establish Care              HPI/SUBJECTIVE:    Mr. Vikki Branch is a 76 y.o. M seen today to establish care with Home Based Primary Care. Visit today is joint visit with team nurse, April. Mr. Khadar Mesa is currently living at home with his two sons and his wife, Ester Lopez. Ester Lopez is primary caretaker and supplements HPI at today's visit. Family moved from [place] to [place] this past year so that they could live with their son. Mr. Khadar Mesa is bedbound from a stroke that occurred, per wife, about 4 years ago. He has had one additional stroke since that time in 2022. He has residual quadriparesis, seizure disorder, dysphagia. He had a hospitalization in Nov 2022 for breakthrough seizure in the setting of UTI and missing medications for about 2 weeks (difficulty obtaining medications after relocation). He is now managed on Keppra 500mg BID and is compliant, has not had any seizures since that time. He is staying on a hospital bed, wife gets him up to wheelchair using a Beni lift several times per week. He has contractures of his left arm and hand, and grimaces if this extremity is moved. He was previously followed by a neurologist in [place], but due to his functional decline, wife feels he would not be able to tolerate outpatient visits at this time. She feels that his stiffness and immobility have worsened recently and requests therapy for strengthening and bed exercises. She also notes worsening drooling and coughing with sputum production in the morning. Patient is eating regular meals that are pureed, and wife crushes medications into his food.   Per chart review, SLP saw him in Nov 2022 during hospitalization and performed bedside swallow evaluation, recommending pureed diet with thin liquids. Wife reports trying to upgrade his diet, but team nurse discussed not advancing diet without further recommendations. Lungs sound clear on exam today, though exam is limited by patient cooperation. Discussed s/s of aspiration pneumonia and risks. Will consult PT and SLP home health at this time. Due to immobility, he has a current pressure ulcer (stage 2) of his sacrum. Wife has been applying Vaseline and it has started healing. Wound bed with pink/red granulation and surrounding scar tissue. Will consult home health nursing for wound care. He also has dementia that worsened after his second stroke. He is mostly nonverbal, but does make eye contact and say \"yes\" or \"ok\" throughout exam, though these answers appear unrelated to question. He is calm, but does not cooperate with exam.  Per chart review, he had several hospitalizations in 2022 for stroke, seizure, and Covid-19. He has had a significant functional decline since that time. His past medical history also includes hypertension, hyperlipidemia, GERD, and anemia. His BP is at goal today, currently managed with carvedilol and isosorbide. He is continued on aspirin 81mg for secondary stroke prevention and takes Pantoprazole for GERD and GI protection. His Hgb has ranged between 9.1-10.6 in the past year. He has had no known bleeding events, but stool is chronically dark as he takes oral iron. He is also currently taking B12 and folate. We completed Medicare Wellness Visit today. Wife was provided with phone number for mobile podiatrist as patient has very thickened toenails. Nurse attempted venipuncture for labwork but patient is very difficult stick, so scheduled appointment 3/24 to re-attempt. REVIEW OF SYSTEMS:     HPI obtained from wife, Mike Pearson, due to patient's cognitive limitations/ mostly nonverbal status    Review of Systems   Constitutional:  Negative for fever.    HENT:  Negative for hearing loss and sinus pain. Difficulty swallowing   Eyes:         Unable to assess vision due to cognitive limitations   Respiratory:  Positive for cough and sputum production. Negative for shortness of breath and wheezing. Morning cough with thick, clear sputum production   Cardiovascular:  Negative for chest pain and leg swelling. Gastrointestinal:  Negative for blood in stool, constipation, diarrhea, melena and vomiting. Genitourinary:  Negative for hematuria. Musculoskeletal:  Positive for joint pain. Negative for falls and myalgias. Apparent pain with movement of L arm   Skin:  Negative for rash. Pressure ulcer to sacrum     Neurological:  Positive for seizures and weakness. Last seizure 11/2022   Psychiatric/Behavioral:  The patient does not have insomnia. PHYSICAL EXAM:     Visit Vitals  /82 (BP 1 Location: Right upper arm, BP Patient Position: Semi fowlers, BP Cuff Size: Adult)   Pulse 87   Resp 14   SpO2 97%       Physical Exam  Constitutional:       Comments: Cachectic, chronically ill-appearing male lying in hospital bed in no acute distress   HENT:      Head: Normocephalic and atraumatic. Nose: Nose normal. No rhinorrhea. Mouth/Throat:      Mouth: Mucous membranes are moist.      Pharynx: Oropharynx is clear. Comments: Some missing teeth  Eyes:      General: No scleral icterus. Conjunctiva/sclera: Conjunctivae normal.      Pupils: Pupils are equal, round, and reactive to light. Comments: Gaze conjugate- unable to assess EOM as he will not follow commands   Neck:      Vascular: No carotid bruit. Cardiovascular:      Rate and Rhythm: Normal rate and regular rhythm. Pulses: Normal pulses. Dorsalis pedis pulses are 2+ on the right side and 2+ on the left side. Heart sounds: Normal heart sounds. Pulmonary:      Effort: Pulmonary effort is normal.      Breath sounds: Normal breath sounds. No wheezing or rhonchi. Abdominal:      General: Abdomen is flat. Bowel sounds are normal. There is no distension. Palpations: Abdomen is soft. There is no mass. Tenderness: There is no guarding. Comments: Healed abdominal scar from previous PEG   Genitourinary:     Penis: Normal.       Testes: Normal.   Musculoskeletal:      Cervical back: Normal range of motion. Right lower leg: No edema. Left lower leg: No edema. Comments: Generalized quadriparesis. Contracture of left arm and hand. Some increased rigidity of RUE- resists extension. Feet:      Right foot:      Skin integrity: Skin integrity normal.      Toenail Condition: Right toenails are abnormally thick. Left foot:      Skin integrity: Skin integrity normal.      Toenail Condition: Left toenails are abnormally thick. Skin:     General: Skin is warm and dry. Comments: Stage II to sacrum- wound edges with pink scarring. Wound bed with pink/red granulation tissue without drainage. Neurological:      Motor: Weakness present. Comments: Grimaces with any movement of L arm. Facial features symmetric during grimace. Verbalizes in response to questions but only states \"yes\" and \"ok. \"  Does not follow commands. HISTORY:     Past Medical and Surgical History reviewed in [facility] on date of initial visit    Patient Active Problem List   Diagnosis Code    Severe dementia without behavioral disturbance, psychotic disturbance, mood disturbance, or anxiety F03. C0    Primary hypertension I10    Mixed hyperlipidemia E78.2    Normocytic anemia D64.9    Gastroesophageal reflux disease without esophagitis K21.9    Dysphagia R13.10    Pressure injury of sacral region, stage 2 ([address] L89.152    History of stroke Z86.73     History reviewed. No pertinent family history.    Social History     Tobacco Use    Smoking status: Not on file    Smokeless tobacco: Not on file   Substance Use Topics    Alcohol use: Not on file     No Known Allergies   Current Outpatient Medications   Medication Sig    cyanocobalamin 1,000 mcg tablet Take 1,000 mcg by mouth daily. atorvastatin (LIPITOR) 40 mg tablet Take 40 mg by mouth daily. carvediloL (COREG) 25 mg tablet Take 25 mg by mouth two (2) times daily (with meals). isosorbide dinitrate (ISORDIL) 20 mg tablet Take 20 mg by mouth three (3) times daily. aspirin 81 mg chewable tablet Take 81 mg by mouth daily. pantoprazole (PROTONIX) 40 mg granules for oral suspension 40 mg daily. ferrous sulfate 325 mg (65 mg iron) tablet Take  by mouth Daily (before breakfast). folic acid (FOLVITE) 1 mg tablet Take  by mouth daily. levETIRAcetam (Keppra) 1,000 mg tablet Take 1 Tablet by mouth two (2) times a day. No current facility-administered medications for this visit. LAB DATA REVIEWED:     No results found for: HBA1C, [id number], [id number]  No results found for: MCACR, MCA1, MCA2, MCA3, MCAU, MCAU2, MCALPOCT  Lab Results   Component Value Date/Time    TSH 1.62 11/28/2022 03:27 AM     No results found for: Rissa Mancilla [id number]      Lab Results   Component Value Date/Time    WBC 9.1 12/18/2022 01:20 PM    HGB 9.4 (L) 12/18/2022 01:20 PM    PLATELET 443 26/15/9201 01:20 PM     Lab Results   Component Value Date/Time    Sodium 140 12/18/2022 01:20 PM    Potassium 4.2 12/18/2022 01:20 PM    Chloride 108 12/18/2022 01:20 PM    CO2 26 12/18/2022 01:20 PM    BUN 16 12/18/2022 01:20 PM    Creatinine 1.68 (H) 12/18/2022 01:20 PM    Calcium 8.6 12/18/2022 01:20 PM      Lab Results   Component Value Date/Time    Alk.  phosphatase 179 (H) 12/18/2022 01:20 PM    Protein, total 7.7 12/18/2022 01:20 PM    Albumin 2.4 (L) 12/18/2022 01:20 PM    Globulin 5.3 (H) 12/18/2022 01:20 PM     No results found for: IRON, FE, TIBC, IBCT, PSAT, FERR             Alvaro Majano, NP

## 2023-03-23 NOTE — PROGRESS NOTES
Joint home visit with Liset Lindsay NP to establish primary care relationship. Met with patient, wife Severo Lindsay and son Jourdan Tejada. S - He had his first stroke back in 2019 per wife. O - Patient lying in bed semi fowlers, he is alert, mumbles when spoken to, makes occasional eye contact. Is cooperative with BP and venipuncture. He is dependent in all ADLs, incontinent of bowel and bladder. His wife feeds him for two pureed meals per day. When wife attempts to move his left arm he immediately grimaces. We discussed passive ROM left arm to prevent contractures, we discussed using rolled washcloth in left hand to prevent nails digging into palm. She is also using ball in left hand to passively exercise and prevent contracture from worsening. He has a sacral wound that has beefy red tissue and evidence of granulation tissue. His skin turgor is good, skin is moisturized and apparently well cared for by wife. He has nail fungus all toes bilateral feet. Attempted venipuncture x 1 without success. Pt tolerated without reaction. A - /82 P 87 R 14  Pulse ox 97%    P - Refer for PT and ST, nurse to return tomorrow to again attempt to draw labs.

## 2023-03-23 NOTE — PROGRESS NOTES
Date: 03/23/23    This is a Subsequent Medicare Annual Wellness Visit (AWV), (Performed more than 12 months after effective date of Medicare Part B enrollment and 12 months after last preventive visit, Once in a lifetime)    I have reviewed the patient's medical history in detail and updated the computerized patient record. History obtained from: spouse. Concerns today   (Patient understands that medical problems addressed today may incur additional cost as this is a preventive visit)  -See separate note for management of chronic conditions. History     Patient Active Problem List   Diagnosis Code    Severe dementia without behavioral disturbance, psychotic disturbance, mood disturbance, or anxiety F03. C0    Primary hypertension I10    Mixed hyperlipidemia E78.2    Normocytic anemia D64.9    Gastroesophageal reflux disease without esophagitis K21.9    Dysphagia R13.10    Pressure injury of sacral region, stage 2 (HCC) L89.152    History of stroke Z86.73     Past Medical History:   Diagnosis Date    Cholesterolosis     Chronic kidney disease     GERD (gastroesophageal reflux disease)     Hypercholesterolemia     Hypertension     Kidney disease     Seizure ([address]     Stroke [facility])       History reviewed. No pertinent surgical history. No Known Allergies  Current Outpatient Medications   Medication Sig Dispense Refill    cyanocobalamin 1,000 mcg tablet Take 1,000 mcg by mouth daily. atorvastatin (LIPITOR) 40 mg tablet Take 1 Tablet by mouth daily. 90 Tablet 1    carvediloL (COREG) 25 mg tablet Take 1 Tablet by mouth two (2) times daily (with meals). 180 Tablet 1    ferrous sulfate 325 mg (65 mg iron) tablet Take 1 Tablet by mouth Daily (before breakfast). 90 Tablet 1    folic acid (FOLVITE) 1 mg tablet Take 1 Tablet by mouth daily. 90 Tablet 1    isosorbide dinitrate (ISORDIL) 20 mg tablet Take 1 Tablet by mouth three (3) times daily.  270 Tablet 1    levETIRAcetam (Keppra) 100 mg/mL solution Give 5 ml by mouth two times daily 473 mL 2    pantoprazole (PROTONIX) 40 mg granules for oral suspension Take 40 mg by mouth daily. 90 Each 1    aspirin 81 mg chewable tablet Take 81 mg by mouth daily. History reviewed. No pertinent family history. Social History     Tobacco Use    Smoking status: Never    Smokeless tobacco: Not on file   Substance Use Topics    Alcohol use: Not Currently       Specialists/Care Team   Dudley iL. has established care with the following healthcare providers:  PCP: Lokesh Smith NP  No other specialists at this time due to difficulty tolerating outpatient visits    Health Risk Assessment     Demographics   male  76 y.o. BLACK/    General Health Questions   -During the past 4 weeks:   -How would you rate your health in general? Fair   -How often have you been bothered by feeling dizzy when standing up? N/A unable to stand   -How much have you been bothered by bodily pain? mildly   -Have you noticed any hearing difficulties? no   -Has your physical and emotional health limited your social activities with family or friends? yes    Emotional Health Questions   -Do you have a history of depression, anxiety, or emotional problems? no  -Over the past 2 weeks, have you felt down, depressed or hopeless? RICKI, mostly nonverbal  -Over the past 2 weeks, have you felt little interest or pleasure in doing things? RICKI, mostly nonverbal    Health Habits   Describe the following:  Diet: Eating 3 meals per day, pureed by wife  Do you get 5 servings of fruits or vegetables daily: yes  Do you exercise regularly? N/A bedbound  Tobacco use: Never  Alcohol use: None current    Activities of Daily Living and Functional Status   -Do you need help with eating, walking, dressing, bathing, toileting, the phone, transportation, shopping, preparing meals, housework, laundry, medications or managing money?  yes  -In the past four weeks, was someone available to help you if you needed and wanted help with anything? yes  -Are you confident are you that you can control and manage most of your health problems? yes  -Have you been given information to help you keep track of your medications? yes  -How often do you have trouble taking your medications as prescribed? occasionally    Fall Risk and Home Safety   Have you fallen 2 or more times in the past year? no  Does your home have rugs in the hallway, lack grab bars in the bathroom, lack handrails on the stairs or have poor lighting? no  Do you have smoke detectors and check them regularly? yes  Do you have difficulties driving a car? yes  Do you always fasten your seat belt when you are in a car? N/a does not travel in car    Review of Systems (if indicated for problems addressed today)   -See separate note for management of chronic conditions. Physical Examination     Vitals:    03/23/23 1015   BP: 132/82   Pulse: 87   Resp: 14   SpO2: 97%     There is no height or weight on file to calculate BMI. No results found. Was the patient's timed Up & Go test unsteady or longer than 30 seconds? N/A unable to obtain    Evaluation of Cognitive Function   Mood/affect:  neutral  Orientation: Only states \"yes\" and \"ok\"- opens eyes to name  Appearance: casually dressed  Family member/caregiver input: -See separate note for management of chronic conditions. Additional exam if indicated for problems addressed today:  -See separate note for physical exam.    Advice/Referrals/Counseling (as indicated)   Education and counseling provided for any problems identified above:   -See separate note for management of chronic conditions. Preventive Services   Hep C and Lipic Screening completed today. UTD on Covid vaccinations. Records requested for other prior vaccinations.     Health Maintenance Due   Topic Date Due    Hepatitis C Screening  Never done    Depression Screen  Never done    COVID-19 Vaccine (1) Never done    Lipid Screen  Never done    DTaP/Tdap/Td series (1 - Tdap) Never done    Shingles Vaccine (1 of 2) Never done    Pneumococcal 65+ years (1 - PCV) Never done    Flu Vaccine (1) Never done    Medicare Yearly Exam  Never done       (Preventive care checklist to be included in patient instructions)  Discussed today Done Previously Not Needed     X  Pneumococcal vaccines    X  Flu vaccine     X Hepatitis B vaccine (if at risk)    X  Shingles vaccine    X  TDAP vaccine     X Digital rectal exam     X PSA     X Colorectal cancer screening     X Low-dose CT for lung cancer screening     X Bone density test     X Glaucoma screening   X   Cholesterol test    X  Diabetes screening test      X Diabetes self-management class     X Nutritionist referral for diabetes or renal disease     Discussion of Advance Directive   Discussed with David Suh. his ability to prepare and advance directive in the case that an injury or illness causes him to be unable to make health care decisions. Wife and son are preparing POA paperwork currently. Wife is primary healthcare surrogate and legal next of kin    Assessment/Plan       ICD-10-CM ICD-9-CM    1. Severe dementia without behavioral disturbance, psychotic disturbance, mood disturbance, or anxiety, unspecified dementia type  F03. C0 294.20 CBC WITH AUTOMATED DIFF      METABOLIC PANEL, COMPREHENSIVE      REFERRAL TO HOME HEALTH      2. History of stroke  Z86.73 V12.54 REFERRAL TO HOME HEALTH      3. Primary hypertension  I10 401.9 CBC WITH AUTOMATED DIFF      METABOLIC PANEL, COMPREHENSIVE      4. Seizure disorder ([facility] [address]  G40.909 345.90       5. Mixed hyperlipidemia  E78.2 272.2 LIPID PANEL      6. Normocytic anemia  D64.9 285.9 IRON PROFILE      FERRITIN      7. Gastroesophageal reflux disease without esophagitis  K21.9 530.81       8. Dysphagia, unspecified type  R13.10 787.20 REFERRAL TO HOME HEALTH      9. Pressure injury of sacral region, stage 2 (HCC)  L89.152 707.03      707.22       10.  Need for hepatitis C screening test Z11.59 V73.89 HEPATITIS C AB      11. Hyperglycemia  R73.9 790.29 HEMOGLOBIN A1C WITH EAG      12. Encounter for subsequent annual wellness visit in Medicare patient  Z00.00 V70.0           -MWV completed today.  -Immunizations: Records requested, Covid-19 vaccination series complete  -Preventive Services: Discussed   -AMD and Code status: Discussed today, formal paperwork pending  -Continue routine follow up for management of chronic conditions.     George Siddiqui NP

## 2023-03-23 NOTE — PROGRESS NOTES
Home Based Primary Care  Plan of Care    [facility] Team Members: Juan Quiroz MD; Jake Miranda NP; Nimesh Mena NP; Mandeep England, RN; Griselda Rooney, RN; Tania Hopson, DEREK; Cristhian Del Real LCSW    Zoila Greyin  1945 / [id number]  male    Date of Initial Visit (Start of Care): 3/23/23    Diagnosis:   Patient Active Problem List   Diagnosis Code    Severe dementia without behavioral disturbance, psychotic disturbance, mood disturbance, or anxiety F03. C0    Primary hypertension I10    Mixed hyperlipidemia E78.2    Normocytic anemia D64.9    Gastroesophageal reflux disease without esophagitis K21.9    Dysphagia R13.10    Pressure injury of sacral region, stage 2 ([facility] [address] L89.152    History of stroke Z86.73       Patient status summary: 76 y.o. male who was referred to the [facility] program due to quadriparesis s/t stroke, severe dementia. Patient discussed today for initial POC review. Advance Care Planning:  Code status: Prior      Primary Decision Maker: Mia Hair - [phone]   Advance Care Planning 11/28/2022   Patient's Healthcare Decision Maker is: Legal Next of Kin   Confirm Advance Directive -   Patient Would Like to Complete Advance Directive Unable       DME/Supplies:  Hospital Bed, [employer], and Wheelchair (non-motorized)     No Known Allergies    Nutritional Requirements:   Oral with supported feeding    Functional/Activity Level:  Bedbound only    Safety Measures:   Aspiration risk and Self-care deficity    Acuity Level Rating: High    Current Outpatient Medications   Medication Sig    cyanocobalamin 1,000 mcg tablet Take 1,000 mcg by mouth daily. atorvastatin (LIPITOR) 40 mg tablet Take 1 Tablet by mouth daily. carvediloL (COREG) 25 mg tablet Take 1 Tablet by mouth two (2) times daily (with meals). ferrous sulfate 325 mg (65 mg iron) tablet Take 1 Tablet by mouth Daily (before breakfast). folic acid (FOLVITE) 1 mg tablet Take 1 Tablet by mouth daily.     isosorbide dinitrate (ISORDIL) 20 mg tablet Take 1 Tablet by mouth three (3) times daily. levETIRAcetam (Keppra) 100 mg/mL solution Give 5 ml by mouth two times daily    pantoprazole (PROTONIX) 40 mg granules for oral suspension Take 40 mg by mouth daily. aspirin 81 mg chewable tablet Take 81 mg by mouth daily. No current facility-administered medications for this visit. The Plan of Care has been initiated for within 15 days of New Visit  and reviewed and updated by the Interdisciplinary Group (IDG) as frequently as the patient condition warrants. Plan of Care by Discipline:    1. Provider  Identify patient's health care goal(s), Reduction of polypharmacy within benefit/burden framework appropriate to age, function and disease state, Determine need for laboratory assessment based on patient disease status , Assess results of laboratory testing and adjust treatment plan as appropriate, and Create and implement disease /symptom specific plan to manage high risk conditions / symptoms    2. Nursing  Identify patient's health care goal(s), Coordination of care with specialty services, Education regarding disease state, Education regarding current medications and adverse effects, and Education for safety; falls    3. Social Work  Crissy [id number], Establish therapeutic relationship through in home visits and telephonic touch points, Assess safety concerns and address as appropriate, Assess for caregiver burden and intervene as psychosocially indicated, and Provide information on available community resources      Plan of Care Orders / Action Items:  -Dementia: Likely with vascular component given history of multiple strokes. MRI 11/2022 significant for \"Severe chronic microvascular ischemic change and severe cerebral atrophy. \"  Patient says only a few words, states \"yes\" or \"ok\" to questions asked but does not seem related to question.   Does not follow commands, very little meaningful movement, reliant on family for total care. Previously saw neurologist Casa Crouch MD in [place] but does not have neurologist in [place] and wife does not believe it would be realistic for him to attend outpatient appointments. Previously managed on donepezil and memantine which he is no longer taking.    -History of CVA: Per wife, two prior strokes with most significant about 4 years ago. She describes activity like TIAs since that time. Per chart review, also had stroke in 2022 during series of hospitalization and rehabilitation events (also with Covid-19 at the time). Has residual L-sided spastic hemiplegia and dysphagia. Currently on aspirin 81mg, atorvastatin 40mg. Wife requesting PT due to increased weakness and stiffness- will consult at this time. -HTN: BP at goal during visit today on current therapy with isosorbide, carvedilol. CBC, CMP to be attempted by team nurse at visit tomorrow.  -Seizure disorder: Secondary to stroke. Last known seizure activity Nov 28, 2022 with subsequent hospitalization. Seizure occurred in setting of UTI and patient had not had medication for about 2 weeks. Seen by Dr. Angelina Sanchez, neurologist, during hospitalization and he is currently managed on Keppra 500mg BID (liquid). No longer followed by neurologist as he is unable to leave the home for outpatient appointments.    -Anemia: Last H/H in Dec 2022 was 9.4/28.9, normocytic normochromic. He is currently taking Z58, folic acid, and ferrous sulfate daily. No recent bleeding events, taking aspirin 81mg daily for secondary stroke prevention and pantoprazole for GI protection. Will attempt CBC repeat with nurse visit tomorrow. -GERD: Currently managed on pantoprazole. Also with dysphagia and increased oral secretions, high risk for aspiration.   Seated upright for all meals- continue current therapy.   -Dysphagia: Seen by SLP during hospitalization 11/18/22 for bedside swallow exam.  Noted to have moderate oral dysphagia, recommended pureed diet with thin liquids, supervision for all intake and sit upright for all meals. Last ED visit 12/18/22 for vomiting/concern for aspiration pneumonia. CXR negative at that time. Wife giving pureed diet, medications crushed in food. She describes advancing his diet, team nurse counseled to continue to puree as he is high risk for aspiration pneumonia. Wife notes some sputum and worsening secretions overnight. Will consult SLP for further evaluation and treatment.   -Stage II PU: Improving per wife, surrounding pink scar tissue, wound bed with granulation tissue. Wife is applying Vaseline to buttocks and sacrum. Counseled to apply cream with Zinc for additional protection. Continue offloading, encourage protein sources in diet, prompt changing of briefs and incontinence care to keep skin clean/dry. Will consult [facility] for additional management. Skin of heels intact bilaterally. -Medicare Wellness Visit: completed today (see separate note for details)      -Labs: CBC, CMP, A1C, Lipid panel, Iron, Ferritin, Hep C screen to be attempted by nurse 3/24/23  -AMD/Code status: Wife working to   -Follow up: nurse visit tomorrow to attempt labs, follow-up with NP in 5 weeks (5/3/23). Will need AMD discussion.        Health Maintenance   Topic Date Due    Hepatitis C Screening  Never done    Depression Screen  Never done    COVID-19 Vaccine (1) Never done    Lipid Screen  Never done    DTaP/Tdap/Td series (1 - Tdap) Never done    Shingles Vaccine (1 of 2) Never done    Pneumococcal 65+ years (1 - PCV) Never done    Flu Vaccine (1) Never done    Medicare Yearly Exam  03/23/2024         Estimated Visit Frequency:  Every 2 month Provider Visit  SW visits PRN

## 2023-03-24 ENCOUNTER — HOME VISIT (OUTPATIENT)
Dept: FAMILY MEDICINE CLINIC | Age: 78
End: 2023-03-24

## 2023-03-24 LAB
ALBUMIN SERPL-MCNC: 2.8 G/DL (ref 3.5–5)
ALBUMIN/GLOB SERPL: 0.7 (ref 1.1–2.2)
ALP SERPL-CCNC: 151 U/L (ref 45–117)
ALT SERPL-CCNC: 8 U/L (ref 12–78)
ANION GAP SERPL CALC-SCNC: 3 MMOL/L (ref 5–15)
AST SERPL-CCNC: 5 U/L (ref 15–37)
BASOPHILS # BLD: 0.1 K/UL (ref 0–0.1)
BASOPHILS NFR BLD: 1 % (ref 0–1)
BILIRUB SERPL-MCNC: 0.3 MG/DL (ref 0.2–1)
BUN SERPL-MCNC: 23 MG/DL (ref 6–20)
BUN/CREAT SERPL: 14 (ref 12–20)
CALCIUM SERPL-MCNC: 9.1 MG/DL (ref 8.5–10.1)
CHLORIDE SERPL-SCNC: 106 MMOL/L (ref 97–108)
CHOLEST SERPL-MCNC: 92 MG/DL
CO2 SERPL-SCNC: 27 MMOL/L (ref 21–32)
CREAT SERPL-MCNC: 1.66 MG/DL (ref 0.7–1.3)
DIFFERENTIAL METHOD BLD: ABNORMAL
EOSINOPHIL # BLD: 0.4 K/UL (ref 0–0.4)
EOSINOPHIL NFR BLD: 5 % (ref 0–7)
ERYTHROCYTE [DISTWIDTH] IN BLOOD BY AUTOMATED COUNT: 13.8 % (ref 11.5–14.5)
EST. AVERAGE GLUCOSE BLD GHB EST-MCNC: 108 MG/DL
FERRITIN SERPL-MCNC: 148 NG/ML (ref 26–388)
GLOBULIN SER CALC-MCNC: 3.8 G/DL (ref 2–4)
GLUCOSE SERPL-MCNC: 105 MG/DL (ref 65–100)
HBA1C MFR BLD: 5.4 % (ref 4–5.6)
HCT VFR BLD AUTO: 31.1 % (ref 36.6–50.3)
HCV AB SERPL QL IA: NONREACTIVE
HDLC SERPL-MCNC: 48 MG/DL
HDLC SERPL: 1.9 (ref 0–5)
HGB BLD-MCNC: 9.7 G/DL (ref 12.1–17)
IMM GRANULOCYTES # BLD AUTO: 0 K/UL (ref 0–0.04)
IMM GRANULOCYTES NFR BLD AUTO: 0 % (ref 0–0.5)
IRON SATN MFR SERPL: 23 % (ref 20–50)
IRON SERPL-MCNC: 42 UG/DL (ref 35–150)
LDLC SERPL CALC-MCNC: 31.6 MG/DL (ref 0–100)
LYMPHOCYTES # BLD: 2 K/UL (ref 0.8–3.5)
LYMPHOCYTES NFR BLD: 27 % (ref 12–49)
MCH RBC QN AUTO: 29.8 PG (ref 26–34)
MCHC RBC AUTO-ENTMCNC: 31.2 G/DL (ref 30–36.5)
MCV RBC AUTO: 95.4 FL (ref 80–99)
MONOCYTES # BLD: 0.6 K/UL (ref 0–1)
MONOCYTES NFR BLD: 8 % (ref 5–13)
NEUTS SEG # BLD: 4.3 K/UL (ref 1.8–8)
NEUTS SEG NFR BLD: 59 % (ref 32–75)
NRBC # BLD: 0 K/UL (ref 0–0.01)
NRBC BLD-RTO: 0 PER 100 WBC
PLATELET # BLD AUTO: 265 K/UL (ref 150–400)
PMV BLD AUTO: 11.3 FL (ref 8.9–12.9)
POTASSIUM SERPL-SCNC: 5.2 MMOL/L (ref 3.5–5.1)
PROT SERPL-MCNC: 6.6 G/DL (ref 6.4–8.2)
RBC # BLD AUTO: 3.26 M/UL (ref 4.1–5.7)
SODIUM SERPL-SCNC: 136 MMOL/L (ref 136–145)
TIBC SERPL-MCNC: 186 UG/DL (ref 250–450)
TRIGL SERPL-MCNC: 62 MG/DL (ref ?–150)
VLDLC SERPL CALC-MCNC: 12.4 MG/DL
WBC # BLD AUTO: 7.4 K/UL (ref 4.1–11.1)

## 2023-03-24 NOTE — PROGRESS NOTES
Home visit for lab draw. Patient in bed, semi-beltrán's position, opens eyes to calling his name. Venipuncture x 1 in right inner forearm, blood sample obtained for Hep C AB, lipid panel, CBC, CMP, Hgb A1c, iron profile, ferritin. Pt tolerated well. Labs taken to [facility] lab.

## 2023-03-29 ENCOUNTER — TELEPHONE (OUTPATIENT)
Dept: FAMILY MEDICINE CLINIC | Age: 78
End: 2023-03-29

## 2023-03-29 NOTE — TELEPHONE ENCOUNTER
Clinical information - Last NP visit - faxed to [facility] as requested. Confirmation uploaded to Media.

## 2023-03-29 NOTE — TELEPHONE ENCOUNTER
Call placed to [facility] to follow up on the Referral status. I spoke with Mustapha Syed, who informed that she did not see any referral pending for this patient. She did see the referral to his son, and that was declined due to insurance. Mustapha Syed informed me that she will be watching the fax income in case we decide to send the Referral F# [phone]. I discussed the information above with Lisa Gan NP. NP clarified that patient does need to be referred to this agency as he NEEDS speech therapy and [facility] is the only agency that provides the service in the area. Referral was faxed to [facility]. Confirmation scanned to Media.

## 2023-03-29 NOTE — TELEPHONE ENCOUNTER
Aldine Schilder called from [facility], [place]. She called to request an H & P for the patient be faxed to [phone]. She said that they are getting ready to call the patient and are hoping to visit the patient tomorrow.   Yessy Bauer said thank you for the referral.

## 2023-04-21 ENCOUNTER — TELEPHONE (OUTPATIENT)
Dept: FAMILY MEDICINE CLINIC | Age: 78
End: 2023-04-21

## 2023-04-21 NOTE — TELEPHONE ENCOUNTER
Jesusita Bose reports that ST has advanced diet to finely chopped. States that patient had [facility] chicken nuggets yesterday and was very enthusiastic chewing them. She states that [address] and PT have discharged but ST is still visiting.

## 2023-04-27 ENCOUNTER — TELEPHONE (OUTPATIENT)
Dept: FAMILY MEDICINE CLINIC | Age: 78
End: 2023-04-27

## 2023-04-27 NOTE — TELEPHONE ENCOUNTER
Called patient to confirm their in home visit with Ramona Sr on Wednesday, 5/3/2023, arrival between 12-4pm.  Spoke with Marisol Randolph, they confirmed the appointment and answered the covid screen questions.  Does anyone in the household have covid NO  Have there been any changes to insurance NO or location NO

## 2023-05-01 ENCOUNTER — TELEPHONE (OUTPATIENT)
Dept: FAMILY MEDICINE CLINIC | Age: 78
End: 2023-05-01

## 2023-05-01 NOTE — TELEPHONE ENCOUNTER
LCSW called and spoke to pt's spouse for routine psychosocial check-in. She stated that she was currently feeding pt, but is open to in-home visit from LCSW. LCSW to visit on Friday 5/5/23 @ 1:30pm for routine psychosocial assessment of pt's needs and resources.      Luis Alberto Walsh, MSW, [facility]    [facility] [address] [place] [address]  (n) [phone]

## 2023-05-03 ENCOUNTER — DOCUMENTATION ONLY (OUTPATIENT)
Dept: FAMILY MEDICINE CLINIC | Age: 78
End: 2023-05-03

## 2023-05-03 ENCOUNTER — HOME VISIT (OUTPATIENT)
Dept: FAMILY MEDICINE CLINIC | Age: 78
End: 2023-05-03
Payer: MEDICARE

## 2023-05-03 VITALS
DIASTOLIC BLOOD PRESSURE: 62 MMHG | RESPIRATION RATE: 16 BRPM | OXYGEN SATURATION: 96 % | HEART RATE: 67 BPM | TEMPERATURE: 98.2 F | SYSTOLIC BLOOD PRESSURE: 110 MMHG

## 2023-05-03 DIAGNOSIS — G40.909 SEIZURE DISORDER (HCC): ICD-10-CM

## 2023-05-03 DIAGNOSIS — E87.5 HYPERKALEMIA: ICD-10-CM

## 2023-05-03 DIAGNOSIS — F03.C0 SEVERE DEMENTIA WITHOUT BEHAVIORAL DISTURBANCE, PSYCHOTIC DISTURBANCE, MOOD DISTURBANCE, OR ANXIETY, UNSPECIFIED DEMENTIA TYPE (HCC): Primary | ICD-10-CM

## 2023-05-03 DIAGNOSIS — Z86.73 HISTORY OF STROKE: ICD-10-CM

## 2023-05-03 DIAGNOSIS — Z23 NEED FOR VACCINATION: ICD-10-CM

## 2023-05-03 DIAGNOSIS — D64.9 NORMOCYTIC ANEMIA: ICD-10-CM

## 2023-05-03 DIAGNOSIS — E78.2 MIXED HYPERLIPIDEMIA: ICD-10-CM

## 2023-05-03 DIAGNOSIS — T14.8XXA FRICTION INJURY TO SKIN: ICD-10-CM

## 2023-05-03 DIAGNOSIS — I10 PRIMARY HYPERTENSION: ICD-10-CM

## 2023-05-03 DIAGNOSIS — N18.32 STAGE 3B CHRONIC KIDNEY DISEASE (CKD) (HCC): ICD-10-CM

## 2023-05-03 DIAGNOSIS — R13.11 ORAL PHASE DYSPHAGIA: ICD-10-CM

## 2023-05-03 DIAGNOSIS — K21.9 GASTROESOPHAGEAL REFLUX DISEASE WITHOUT ESOPHAGITIS: ICD-10-CM

## 2023-05-03 PROCEDURE — 1123F ACP DISCUSS/DSCN MKR DOCD: CPT | Performed by: NURSE PRACTITIONER

## 2023-05-03 PROCEDURE — 99350 HOME/RES VST EST HIGH MDM 60: CPT | Performed by: NURSE PRACTITIONER

## 2023-05-03 PROCEDURE — 3078F DIAST BP <80 MM HG: CPT | Performed by: NURSE PRACTITIONER

## 2023-05-03 PROCEDURE — 3074F SYST BP LT 130 MM HG: CPT | Performed by: NURSE PRACTITIONER

## 2023-05-03 RX ORDER — ZINC OXIDE 20 G/100G
OINTMENT TOPICAL DAILY
Qty: 56 G | Refills: 1 | Status: SHIPPED | OUTPATIENT
Start: 2023-05-03

## 2023-05-04 ENCOUNTER — TELEPHONE (OUTPATIENT)
Dept: FAMILY MEDICINE CLINIC | Age: 78
End: 2023-05-04

## 2023-05-05 ENCOUNTER — NURSE ONLY (OUTPATIENT)
Facility: CLINIC | Age: 78
End: 2023-05-05

## 2023-05-30 ENCOUNTER — TELEPHONE (OUTPATIENT)
Facility: CLINIC | Age: 78
End: 2023-05-30

## 2023-05-30 NOTE — TELEPHONE ENCOUNTER
Remy Cabrera called to request PT for the patient. She said the patient needs exercise. Sindy Chan also said that the patient's toenails are getting thick and hard to cut. She said that she called [address] [place] [address] but they wanted to come at 4:30 pm and that was not a convenient time. I recommended to Sindy Chan to call [address] [place] [address] at [phone]. She acknowledged.

## 2023-05-30 NOTE — TELEPHONE ENCOUNTER
Telephone call returned to wife Joseph Branham. She advises that the patient needs physical therapy for exercise. She states that [facility] PT visited patient one time and told Joseph Branham that there was nothing they could do for him. We discussed that as Jorge Smith is unable to follow instructions and participate with therapy directions, all that can be done is for wife to perform passive range of motion to keep arms and legs from anam further. Joseph Branham understands this. She also reports that patient is in need of podiatry services. Advised that [facility] does not provide podiatry services in the home and that we have given [facility] but that is the only podiatry service that we are aware of that makes home visits. She reports that someone came to the home to measure him for high back reclining wheelchair but the cost is $5000 and so far Medicare has not agreed to pay this cost.  During our conversation Joseph Branham asked who was going to order his medications, I advised that Gerald Samuel NP is his PCP and will continue to prescribe his medications - advised to check the bottles and see that the name Jonny Pearce is listed as prescriber. At another point in our conversation she inquired why are your people continuing to visit him at home. Again advised that Gerald Samuel NP is patient's PCP and will continue to visit patient unless Joseph Branham cancels our services.

## 2023-07-27 ENCOUNTER — TELEPHONE (OUTPATIENT)
Facility: CLINIC | Age: 78
End: 2023-07-27

## 2023-07-27 NOTE — TELEPHONE ENCOUNTER
Called patient to confirm their in home visit with Elizabeth Garcia on Wednesday, 8/2/2023, arrival between 12-4pm.  Spoke with Jeffrey Weston, they confirmed the appointment and answered the covid screen questions.  Does anyone in the household have covid NO  Have there been any changes to insurance NO or location NO.

## 2023-08-02 ENCOUNTER — OFFICE VISIT (OUTPATIENT)
Facility: CLINIC | Age: 78
End: 2023-08-02
Payer: MEDICARE

## 2023-08-02 ENCOUNTER — TELEPHONE (OUTPATIENT)
Facility: CLINIC | Age: 78
End: 2023-08-02

## 2023-08-02 VITALS
DIASTOLIC BLOOD PRESSURE: 62 MMHG | SYSTOLIC BLOOD PRESSURE: 122 MMHG | OXYGEN SATURATION: 97 % | HEART RATE: 72 BPM | RESPIRATION RATE: 16 BRPM | TEMPERATURE: 97.8 F

## 2023-08-02 DIAGNOSIS — R13.11 ORAL PHASE DYSPHAGIA: ICD-10-CM

## 2023-08-02 DIAGNOSIS — D64.9 NORMOCYTIC ANEMIA: ICD-10-CM

## 2023-08-02 DIAGNOSIS — I10 PRIMARY HYPERTENSION: ICD-10-CM

## 2023-08-02 DIAGNOSIS — G40.909 SEIZURE DISORDER (HCC): ICD-10-CM

## 2023-08-02 DIAGNOSIS — E87.5 HYPERKALEMIA: ICD-10-CM

## 2023-08-02 DIAGNOSIS — Z86.73 HISTORY OF STROKE: ICD-10-CM

## 2023-08-02 DIAGNOSIS — E78.2 MIXED HYPERLIPIDEMIA: ICD-10-CM

## 2023-08-02 DIAGNOSIS — L89.152 PRESSURE INJURY OF SACRAL REGION, STAGE 2 (HCC): ICD-10-CM

## 2023-08-02 DIAGNOSIS — F03.C0 SEVERE DEMENTIA WITHOUT BEHAVIORAL DISTURBANCE, PSYCHOTIC DISTURBANCE, MOOD DISTURBANCE, OR ANXIETY, UNSPECIFIED DEMENTIA TYPE (HCC): Primary | ICD-10-CM

## 2023-08-02 DIAGNOSIS — N18.32 STAGE 3B CHRONIC KIDNEY DISEASE (CKD) (HCC): ICD-10-CM

## 2023-08-02 DIAGNOSIS — K21.9 GASTROESOPHAGEAL REFLUX DISEASE WITHOUT ESOPHAGITIS: ICD-10-CM

## 2023-08-02 DIAGNOSIS — I69.359 HEMIPLEGIA OF NONDOMINANT SIDE AS LATE EFFECT OF CEREBROVASCULAR ACCIDENT (CVA) (HCC): ICD-10-CM

## 2023-08-02 PROCEDURE — G8419 CALC BMI OUT NRM PARAM NOF/U: HCPCS | Performed by: NURSE PRACTITIONER

## 2023-08-02 PROCEDURE — 3078F DIAST BP <80 MM HG: CPT | Performed by: NURSE PRACTITIONER

## 2023-08-02 PROCEDURE — 1036F TOBACCO NON-USER: CPT | Performed by: NURSE PRACTITIONER

## 2023-08-02 PROCEDURE — 3074F SYST BP LT 130 MM HG: CPT | Performed by: NURSE PRACTITIONER

## 2023-08-02 PROCEDURE — 1123F ACP DISCUSS/DSCN MKR DOCD: CPT | Performed by: NURSE PRACTITIONER

## 2023-08-02 PROCEDURE — 99349 HOME/RES VST EST MOD MDM 40: CPT | Performed by: NURSE PRACTITIONER

## 2023-08-02 ASSESSMENT — PATIENT HEALTH QUESTIONNAIRE - PHQ9: DEPRESSION UNABLE TO ASSESS: FUNCTIONAL CAPACITY MOTIVATION LIMITS ACCURACY

## 2023-08-02 NOTE — ASSESSMENT & PLAN NOTE
Likely with vascular component given history of multiple strokes. MRI 11/2022 significant for \"Severe chronic microvascular ischemic change and severe cerebral atrophy. \"  Patient says only a few words, does not follow commands, very little meaningful movement, reliant on family for total care. Previously saw neurologist in [place] but does not have neurologist in [facility] and wife does not believe it would be realistic for him to attend outpatient appointments. Previously managed on donepezil(no longer taking) and memantine (dc due to diarrhea).

## 2023-08-02 NOTE — ASSESSMENT & PLAN NOTE
Major stroke 6/27/17 found on floor with L-sided weakness and dysarthria,  intubated but did not receive TPA due to unknown time of onset. Per chart review, also had stroke in 2022 during series of hospitalization and rehabilitation events (also with Covid-19 at the time). Has residual L-sided spastic hemiplegia and dysphagia. Previously saw neurologist Dr. Yris Quinn, neurologist in [facility] but is no longer able to attend outpatient appointments. Managed on aspirin 81mg, atorvastatin 40mg. Wife requests additional PT referral for equipment evaluation and bed exercises.

## 2023-08-02 NOTE — ASSESSMENT & PLAN NOTE
Last H/H in March 2023 was 9.7/31.1, normocytic normochromic. Iron and ferritin WNL. He is currently taking P98, folic acid, and ferrous sulfate daily. No recent bleeding  events, taking aspirin 81mg daily for secondary stroke prevention and pantoprazole for GI protection.   Will ask team nurse to recheck CBC with nurse visit

## 2023-08-02 NOTE — TELEPHONE ENCOUNTER
Rahel Hart called to find out if the patient is on the visit schedule today and when Farhad Marino will arrive. I reminded Mikki Alexander that we give an arrival window of either 9am-1pm or 12-4pm depending on when the provider schedules the patient's appointment. Mikki Alexander said that she did not know that and said that she has two people there waiting and asked if the provider is close by or on their way to her house. I reminded Mikki Alexander that we do not guarantee arrival times and providers do not typically text or call when on their way. She acknowledged. I repeated the arrival window again and she said that she would have preferred the earlier window. I acknowledged and let her know that I would send a message to the clinical team.  When I initially confirmed the patient's appointment, I went over the arrival date and time during the confirmation call.   Lillian's callback if needed is [phone]

## 2023-08-02 NOTE — ASSESSMENT & PLAN NOTE
Currently managed on pantoprazole. Also with dysphagia and increased oral secretions, high risk for aspiration. Seated upright for all meals.

## 2023-08-02 NOTE — ASSESSMENT & PLAN NOTE
Secondary to stroke. EEG 2/2020 abnormal due to generalized epileptiform activity . Last known seizure activity Nov 28, 2022 with subsequent hospitalization. Seizure occurred in setting of UTI and patient had not had medication for about 2 weeks. Seen by Dr. Trinity Flaherty, neurologist, during hospitalization and he is currently managed on Keppra 500mg BID (liquid). No longer followed by neurologist as he is unable to leave the home for outpatient appointments.   Will plan to recheck level with nurse visit

## 2023-08-02 NOTE — ASSESSMENT & PLAN NOTE
Last renal panel March 2023 with Cr 1.66, eGFR 42. Previously saw nephrologist Dr. Santhosh Lai  with most recent labs 5/19 with Cr 1.79, GFR 36. Avoid nephrotoxic medications and consider renal dosing.   Not able to follow up with nephrology outpatient at this time

## 2023-08-02 NOTE — ASSESSMENT & PLAN NOTE
Seen by SLP during hospitalization 11/18/22 for bedside swallow exam significant for moderate oral dysphagia, recommended pureed diet with thin liquids, supervision for all intake and sit upright for all meals. Worked with home health SLP, has been upgraded to chopped foods which he is tolerating without choking or signs of aspiration.

## 2023-08-03 PROBLEM — I69.359: Status: ACTIVE | Noted: 2023-08-03

## 2023-08-03 NOTE — ASSESSMENT & PLAN NOTE
Significantly improved today- wound improves and worsens. Reviewed use of Zinc as barrier cream, do not scrub. Reposition every 2 hours using pillows as wedge, prompt incontinence care and adequate protein sources/hydration. No apparent pain or s/s of infection.

## 2023-08-03 NOTE — PROGRESS NOTES
[facility]    [phone]     Date of Current Visit: 8/2/23       SUMMARY       Patient seen in the home today due to taxing effort to seek primary care services in the community s/t advanced dementia, bedbound status s/t stroke     Patient/Family present for Current Visit:  Patient, wife Mazin Cotto, julio césar Eden Signs of Care as stated by the patient/family is: Improve current functional status, would want hospitalization if indicated         ASSESSMENT AND PLAN       1. Severe dementia without behavioral disturbance, psychotic disturbance, mood disturbance, or anxiety, unspecified dementia type ([address])    2. History of stroke    3. 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 Primary hypertension    5. Seizure disorder ([address])    6. Normocytic anemia    7. Gastroesophageal reflux disease without esophagitis    8. Oral phase dysphagia    9. Stage 3b chronic kidney disease (CKD) ([address])    10. Mixed hyperlipidemia    11. Pressure injury of sacral region, stage 2 ([address])    12. Hyperkalemia            1. Severe dementia without behavioral disturbance, psychotic disturbance, mood disturbance, or anxiety, unspecified dementia type ([address])  Likely with vascular component given history of multiple strokes. MRI 11/2022 significant for \"Severe chronic microvascular ischemic change and severe cerebral atrophy. \"  Patient says only a few words, does not follow commands, very little meaningful movement, reliant on family for total care. Previously saw neurologist in [place] but does not have neurologist in [facility] and wife does not believe it would be realistic for him to attend outpatient appointments. Previously managed on donepezil(no longer taking) and memantine (dc due to diarrhea). 2. History of stroke/ 3.  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

## 2023-08-04 ENCOUNTER — TELEPHONE (OUTPATIENT)
Facility: CLINIC | Age: 78
End: 2023-08-04

## 2023-08-04 ENCOUNTER — HOME HEALTH ADMISSION (OUTPATIENT)
Dept: HOME HEALTH SERVICES | Facility: HOME HEALTH | Age: 78
End: 2023-08-04
Payer: MEDICARE

## 2023-08-04 ENCOUNTER — TELEPHONE (OUTPATIENT)
Age: 78
End: 2023-08-04

## 2023-08-04 DIAGNOSIS — D64.9 NORMOCYTIC ANEMIA: ICD-10-CM

## 2023-08-04 DIAGNOSIS — I10 PRIMARY HYPERTENSION: ICD-10-CM

## 2023-08-04 DIAGNOSIS — E87.5 HYPERKALEMIA: ICD-10-CM

## 2023-08-04 DIAGNOSIS — G40.909 SEIZURE DISORDER (HCC): ICD-10-CM

## 2023-08-04 NOTE — TELEPHONE ENCOUNTER
Telephone call returned to [facility] in [facility] Intake to advise that either Tues or Wed is fine for PT start of care for patient.

## 2023-08-04 NOTE — TELEPHONE ENCOUNTER
Rafiq Rapp called from [facility] and stated that they can schedule the patient's [facility] for Tuesday, 8/8 or Wednesday, 8/9 next week. She asks for a callback to confirm which date is preferred. CB# [phone].

## 2023-08-04 NOTE — TELEPHONE ENCOUNTER
Telephone call to wife Choco Nielson, appt made for this nurse to visit patient on Monday 8/7 at 8 AM to obtain blood sample for labs. Advised wife to make sure that patient has extra fluids on Sunday so that he is extra well hydrated and to have him with extra blankets so his blood vessels will be dilated for venipuncture. She verbalized understanding of instructions and confirmed appt with this nurse.

## 2023-08-04 NOTE — TELEPHONE ENCOUNTER
Spouse called wanting to inform that the wheelchair company contacted her and the wheelchair will be delivered Aug. 10     CB: [phone]

## 2023-08-07 ENCOUNTER — OFFICE VISIT (OUTPATIENT)
Facility: CLINIC | Age: 78
End: 2023-08-07

## 2023-08-07 VITALS
RESPIRATION RATE: 16 BRPM | HEART RATE: 84 BPM | SYSTOLIC BLOOD PRESSURE: 112 MMHG | OXYGEN SATURATION: 97 % | DIASTOLIC BLOOD PRESSURE: 78 MMHG

## 2023-08-07 DIAGNOSIS — G40.909 SEIZURE DISORDER (HCC): Primary | ICD-10-CM

## 2023-08-07 NOTE — PROGRESS NOTES
Home visit for assessment and labs     S - Wife reports he has been doing well    O - Pt awake, eyes open, does not make eye contact. He murmurs when spoken to. Venipuncture x 1 in right posterior forearm, blood sample obtained for CBC, BMP and Keppra level. Pt tolerated procedure well. Wife assisted to immobilize arm during blood sampling. A - Appetite good, bowels moving without issues. /78 P 84 R 16 Pulse ox 97%    P - Update provider, labs taken to [facility] lab dropoff.

## 2023-08-08 LAB
ANION GAP SERPL CALC-SCNC: 4 MMOL/L (ref 5–15)
BUN SERPL-MCNC: 16 MG/DL (ref 6–20)
BUN/CREAT SERPL: 10 (ref 12–20)
CALCIUM SERPL-MCNC: 8.9 MG/DL (ref 8.5–10.1)
CHLORIDE SERPL-SCNC: 106 MMOL/L (ref 97–108)
CO2 SERPL-SCNC: 24 MMOL/L (ref 21–32)
CREAT SERPL-MCNC: 1.63 MG/DL (ref 0.7–1.3)
ERYTHROCYTE [DISTWIDTH] IN BLOOD BY AUTOMATED COUNT: 13.3 % (ref 11.5–14.5)
GLUCOSE SERPL-MCNC: 101 MG/DL (ref 65–100)
HCT VFR BLD AUTO: 29.2 % (ref 36.6–50.3)
HGB BLD-MCNC: 9.3 G/DL (ref 12.1–17)
MCH RBC QN AUTO: 31.4 PG (ref 26–34)
MCHC RBC AUTO-ENTMCNC: 31.8 G/DL (ref 30–36.5)
MCV RBC AUTO: 98.6 FL (ref 80–99)
NRBC # BLD: 0 K/UL (ref 0–0.01)
NRBC BLD-RTO: 0 PER 100 WBC
PLATELET # BLD AUTO: 227 K/UL (ref 150–400)
PMV BLD AUTO: 12.1 FL (ref 8.9–12.9)
POTASSIUM SERPL-SCNC: 4.7 MMOL/L (ref 3.5–5.1)
RBC # BLD AUTO: 2.96 M/UL (ref 4.1–5.7)
SODIUM SERPL-SCNC: 134 MMOL/L (ref 136–145)
WBC # BLD AUTO: 7 K/UL (ref 4.1–11.1)

## 2023-08-09 ENCOUNTER — HOME CARE VISIT (OUTPATIENT)
Facility: HOME HEALTH | Age: 78
End: 2023-08-09

## 2023-08-09 VITALS
HEART RATE: 75 BPM | SYSTOLIC BLOOD PRESSURE: 129 MMHG | OXYGEN SATURATION: 98 % | DIASTOLIC BLOOD PRESSURE: 83 MMHG | TEMPERATURE: 98.1 F | RESPIRATION RATE: 16 BRPM

## 2023-08-09 LAB — LEVETIRACETAM SERPL-MCNC: 33.5 UG/ML (ref 10–40)

## 2023-08-09 PROCEDURE — G0151 HHCP-SERV OF PT,EA 15 MIN: HCPCS

## 2023-08-09 PROCEDURE — 0221000100 HH NO PAY CLAIM PROCEDURE

## 2023-08-09 ASSESSMENT — ENCOUNTER SYMPTOMS
BOWEL INCONTINENCE: 1
SKIN LESIONS: 1

## 2023-08-10 ENCOUNTER — TELEPHONE (OUTPATIENT)
Facility: CLINIC | Age: 78
End: 2023-08-10

## 2023-08-10 NOTE — TELEPHONE ENCOUNTER
Telephone call from Bong with [facility] reporting that patient has a sacral pressure sore. Mulugeta Ruelas that provider Ayaz Tadeo NP is aware of wound and offered HH SN to wife Tim Kiran but she declined to have SN, wanting only PT. Amanda plans to see 2 x wk for 2 weeks and 1 x week for one week for instruction to wife on preventing contractures. Encouraged Amanda to provide picture instructions to wife for reinforcement of instructions as wife has short term memory issues herself.

## 2023-08-10 NOTE — TELEPHONE ENCOUNTER
Telephone call to wife Cj Hassan, lab results discuss as noted in provider's call attempt. Wife pleased with good results.

## 2023-08-10 NOTE — TELEPHONE ENCOUNTER
----- Message from THANH Da Silva NP sent at 8/9/2023  4:54 PM EDT -----  Call placed to [facility] to relay results but no answer and no voicemail. Labs are stable- potassium has returned to normal.  Sodium very slightly low as it has been before but no concerns for symptoms or risks at that level. Kidneys and blood count are stable. Keppra level was WNL.

## 2023-08-10 NOTE — HOME HEALTH
number, physician and pharmacy phone number, agency phone number, and your medications in one place for easy access and in a zip lock bag to protect them. Take your Admission Handbook, written emergency preparedness plan, written medication list, necessary supplies and folder if you relocate in the event of an emergency, if possible. Call agency if you relocate so we can contact you. Patient/Caregiver verbalize knowledge of above through teach back with 15 percent accuracy.

## 2023-08-15 ENCOUNTER — HOME CARE VISIT (OUTPATIENT)
Facility: HOME HEALTH | Age: 78
End: 2023-08-15

## 2023-08-15 PROCEDURE — G0151 HHCP-SERV OF PT,EA 15 MIN: HCPCS

## 2023-08-18 VITALS
DIASTOLIC BLOOD PRESSURE: 70 MMHG | RESPIRATION RATE: 18 BRPM | HEART RATE: 69 BPM | SYSTOLIC BLOOD PRESSURE: 120 MMHG | TEMPERATURE: 98.6 F | OXYGEN SATURATION: 93 %

## 2023-08-19 NOTE — HOME HEALTH
Subjective: \" good day\"  Falls since last visit no falls  Caregiver involvement changes: NA  Home health supplies by type and quantity ordered/delivered this visit include: NA     Clinician asked if patient has had any physician contact since last home care visit and patient states: no  Clinician asked if patient has any new or changed medications and patient states:no  If Yes, were medications reconciled? yes  Was the certifying physician notified of changes in medications? NA    Clinical assessment (what this visit means for the patient overall and need for ongoing skilled care) and progress or lack of progress towards SPECIFIC goals: The PT instructed the cg on how to properly position the pt in the bed to include supine and sidelying with pillows properly placed to protect the pressure points. The PT also instructed the cg on how to stretch the Pt's lower legs to gain extension and the cg will need additional instruction as she needs additional practice helping the pt to extend the knees and gain more ROM so that he does not always have his knees drawn up to his chest.     Written Teaching Material Utilized NA    Interdisciplinary communication with: NA  Discharge planning as follows:  Is no longer homebound, Per physician order, Will discharge when the patient has reached their maximum functional potential and maximum safety in their home and When goals are met    Specific plan for next visit: Re-instruct patient/caregiver on HEP/ROM, Educate in s/s of infection, worsening pressure sore and when to call [facility], MD or [phone]

## 2023-08-23 ENCOUNTER — HOME CARE VISIT (OUTPATIENT)
Facility: HOME HEALTH | Age: 78
End: 2023-08-23

## 2023-08-23 PROCEDURE — G0157 HHC PT ASSISTANT EA 15: HCPCS

## 2023-08-24 NOTE — HOME HEALTH
Subjective: Wife reporting pt is fatigued today  Falls since last visit No(if yes complete the Fall Tracking Form and include bsrifallreport):   Caregiver involvement changes: pt's wife present for visit  Home health supplies by type and quantity ordered/delivered this visit include: n/a    Clinician asked if patient has had any physician contact since last home care visit and patient states: No  Clinician asked if patient has any new or changed medications and patient states:No  If Yes, were medications reconciled? NA  Was the certifying physician notified of changes in medications? NA    Clinical assessment (what this visit means for the patient overall and need for ongoing skilled care) and progress or lack of progress towards SPECIFIC goals: Focused on LE stretching/ROM training with wife who was present throughout visit. Pt able to tolerate seated position EOB with SBA to min A for safety x10 min     Written Teaching Material Utilized: written HEP in home    Interdisciplinary communication with: Carley Medina PT for the purpose of POC collaboration    Discharge planning as follows:  Will discharge when the patient has reached their maximum functional potential and maximum safety in their home    Specific plan for next visit: ongoing caregiver training for LE ROM/stretching

## 2023-08-25 ENCOUNTER — HOME CARE VISIT (OUTPATIENT)
Facility: HOME HEALTH | Age: 78
End: 2023-08-25
Payer: MEDICARE

## 2023-08-25 PROCEDURE — G0157 HHC PT ASSISTANT EA 15: HCPCS

## 2023-08-26 NOTE — HOME HEALTH
Subjective: He's awake today. Falls since last visit No(if yes complete the Fall Tracking Form and include bsrifallreport):   Caregiver involvement changes: pt's wife present for visit   Home health supplies by type and quantity ordered/delivered this visit include: n/a    Clinician asked if patient has had any physician contact since last home care visit and patient states: No  Clinician asked if patient has any new or changed medications and patient states:  No  If Yes, were medications reconciled? NA  Was the certifying physician notified of changes in medications? NA    Clinical assessment (what this visit means for the patient overall and need for ongoing skilled care) and progress or lack of progress towards SPECIFIC goals: Pt more alert today and answereing some of questions asked by PTA. Pt unable to follow VC's with attempts at [facility] of LE's. Wife involved with full session today for instruction for UE and LE stretching program.    Written Teaching Material Utilized: N/A    Interdisciplinary communication with: none at today's visit    Discharge planning as follows: Will discharge when the patient has reached their maximum functional potential and maximum safety in their home    Specific plan for next visit: trial standing from EOB.

## 2023-08-27 VITALS
DIASTOLIC BLOOD PRESSURE: 68 MMHG | HEART RATE: 74 BPM | TEMPERATURE: 98 F | SYSTOLIC BLOOD PRESSURE: 120 MMHG | OXYGEN SATURATION: 93 %

## 2023-08-28 ENCOUNTER — HOME CARE VISIT (OUTPATIENT)
Facility: HOME HEALTH | Age: 78
End: 2023-08-28
Payer: MEDICARE

## 2023-08-28 VITALS
HEART RATE: 77 BPM | SYSTOLIC BLOOD PRESSURE: 122 MMHG | DIASTOLIC BLOOD PRESSURE: 74 MMHG | OXYGEN SATURATION: 94 % | TEMPERATURE: 97.9 F

## 2023-08-28 PROCEDURE — G0157 HHC PT ASSISTANT EA 15: HCPCS

## 2023-08-29 VITALS
TEMPERATURE: 98 F | DIASTOLIC BLOOD PRESSURE: 58 MMHG | OXYGEN SATURATION: 97 % | SYSTOLIC BLOOD PRESSURE: 100 MMHG | HEART RATE: 75 BPM

## 2023-08-29 NOTE — HOME HEALTH
Subjective: Pt's wife reporting pt just finished eating. Falls since last visit No(if yes complete the Fall Tracking Form and include bsrifallreport):   Caregiver involvement changes: wife present and involved in care  Home health supplies by type and quantity ordered/delivered this visit include: n/a    Clinician asked if patient has had any physician contact since last home care visit and patient states: NO  Clinician asked if patient has any new or changed medications and patient states:  NO   If Yes, were medications reconciled? NA  Was the certifying physician notified of changes in medications? NA    Clinical assessment (what this visit means for the patient overall and need for ongoing skilled care) and progress or lack of progress towards SPECIFIC goals: Trial of WB'ing through LE's from EOB with A from wife. Pt unable tolerate any WB'ing through LE's and knees buckled requiring max A x2 to sit back onto bed. Wife reporting compliance of B UE and LE stretching/ROM but pt still postures with LE's flexed and rotated to L side when in supine position. Instructed wife to place pillow between knees to prevent R hip from adducting and IR across L LE. Written Teaching Material Utilized: N/A    Interdisciplinary communication with: Ryan Childress PT for the purpose of POC collaboration    Discharge planning as follows:  Will discharge when the patient has reached their maximum functional potential and maximum safety in their home    Specific plan for next visit: resume visits with primary PT

## 2023-08-31 ENCOUNTER — HOME CARE VISIT (OUTPATIENT)
Facility: HOME HEALTH | Age: 78
End: 2023-08-31
Payer: MEDICARE

## 2023-08-31 PROCEDURE — G0151 HHCP-SERV OF PT,EA 15 MIN: HCPCS

## 2023-09-03 VITALS
TEMPERATURE: 98.4 F | SYSTOLIC BLOOD PRESSURE: 110 MMHG | RESPIRATION RATE: 16 BRPM | HEART RATE: 76 BPM | OXYGEN SATURATION: 96 % | DIASTOLIC BLOOD PRESSURE: 60 MMHG

## 2023-09-05 ENCOUNTER — TELEPHONE (OUTPATIENT)
Facility: CLINIC | Age: 78
End: 2023-09-05

## 2023-09-05 NOTE — TELEPHONE ENCOUNTER
Telephone call returned to wife Satyakaitlin Prasantharden. No answer and VM is full so unable to leave a message.     Return call from wife

## 2023-09-05 NOTE — TELEPHONE ENCOUNTER
Return call from wife Lasha Nixon. She states that patient began having twitching of left lip and along left jawline that she noticed on Sunday 9/4. It happens several times a day, he has no apparent pain from this. Left lip/eye is not drooped, it does not happen when she is feeding. She reports that he is often chewing when there is nothing in his mouth to chew, advised that this is a common behavior with dementia. Lasha Nixon also reports that her often has thick clear mucous drain from his mouth and wet his shirt. Advised to make sure that patient is placed on his side and has the head of bed elevated at least 30 degrees to keep him from choking on his secretions. Discussed that the twitching that she describes does not seem to be seizure activity, it does not sound like stroke or TIA. We discussed that sometimes if patient is a little dehydrated that it can cause muscle twitching. Suggested that Lasha Nixon give him 2 - 8 oz servings of extra liquids each day for the next 3 days to see if the twitching improves. We reviewed s/s of stroke and to call 911 of these are present. We discussed that when we [place] labs his Keppra level was good. Advised to call our office if twitch worsens.

## 2023-09-05 NOTE — TELEPHONE ENCOUNTER
Angela Garcia and stated that the aptient has a twitch on the left side of his face at the jaw line. She asks for a callback at [phone].

## 2023-09-11 ENCOUNTER — IMMUNIZATION (OUTPATIENT)
Age: 78
End: 2023-09-11

## 2023-09-11 DIAGNOSIS — Z23 ENCOUNTER FOR IMMUNIZATION: Primary | ICD-10-CM

## 2023-09-12 NOTE — PROGRESS NOTES
Patient requests flu vaccine and Prevnar 20 vaccine; consent form obtained; denies fever, egg allergy nor past reactions to any injection or immunization. Immunizations given per protocol and recorded in [address] [place] [address] [place]. VIS information sheets given, explained possible S/E. Reviewed sx indicating need to be seen in ER. Pt had no adverse reaction at time of discharge.   Emeli Camara RN

## 2023-09-13 ENCOUNTER — TELEPHONE (OUTPATIENT)
Facility: CLINIC | Age: 78
End: 2023-09-13

## 2023-09-13 NOTE — TELEPHONE ENCOUNTER
Gold Lyons called to ask if we had a  that could advise her on the patient's POA. I let her know that we do not have an  as part of our medical practice but that I would send a message to our , Sumaya Hoffman to call Merlin Grover if she has any resources for her regarding the POA.  # [phone]

## 2023-09-14 ENCOUNTER — TELEPHONE (OUTPATIENT)
Facility: CLINIC | Age: 78
End: 2023-09-14

## 2023-09-14 SDOH — ECONOMIC STABILITY: HOUSING INSECURITY: IN THE LAST 12 MONTHS, HOW MANY PLACES HAVE YOU LIVED?: 1

## 2023-09-14 SDOH — ECONOMIC STABILITY: INCOME INSECURITY: HOW HARD IS IT FOR YOU TO PAY FOR THE VERY BASICS LIKE FOOD, HOUSING, MEDICAL CARE, AND HEATING?: NOT VERY HARD

## 2023-09-14 SDOH — ECONOMIC STABILITY: INCOME INSECURITY: IN THE LAST 12 MONTHS, WAS THERE A TIME WHEN YOU WERE NOT ABLE TO PAY THE MORTGAGE OR RENT ON TIME?: NO

## 2023-09-14 SDOH — HEALTH STABILITY: PHYSICAL HEALTH: ON AVERAGE, HOW MANY MINUTES DO YOU ENGAGE IN EXERCISE AT THIS LEVEL?: 0 MIN

## 2023-09-14 SDOH — HEALTH STABILITY: PHYSICAL HEALTH: ON AVERAGE, HOW MANY DAYS PER WEEK DO YOU ENGAGE IN MODERATE TO STRENUOUS EXERCISE (LIKE A BRISK WALK)?: 0 DAYS

## 2023-09-14 SDOH — ECONOMIC STABILITY: TRANSPORTATION INSECURITY
IN THE PAST 12 MONTHS, HAS LACK OF TRANSPORTATION KEPT YOU FROM MEETINGS, WORK, OR FROM GETTING THINGS NEEDED FOR DAILY LIVING?: NO

## 2023-09-14 SDOH — ECONOMIC STABILITY: HOUSING INSECURITY
IN THE LAST 12 MONTHS, WAS THERE A TIME WHEN YOU DID NOT HAVE A STEADY PLACE TO SLEEP OR SLEPT IN A SHELTER (INCLUDING NOW)?: NO

## 2023-09-14 SDOH — ECONOMIC STABILITY: FOOD INSECURITY: WITHIN THE PAST 12 MONTHS, YOU WORRIED THAT YOUR FOOD WOULD RUN OUT BEFORE YOU GOT MONEY TO BUY MORE.: NEVER TRUE

## 2023-09-14 SDOH — ECONOMIC STABILITY: FOOD INSECURITY: WITHIN THE PAST 12 MONTHS, THE FOOD YOU BOUGHT JUST DIDN'T LAST AND YOU DIDN'T HAVE MONEY TO GET MORE.: NEVER TRUE

## 2023-09-14 NOTE — TELEPHONE ENCOUNTER
NUBIA rec'd message from [facility] office that pt's spouse, Moira Cobb, had called the office to inquire about Power of LUKE SERA information. LCSW called Moira Cobb. She stated that she was told by people through the [employer] (where pt retired from) that in order for her to sign on his behalf, she would have to have [place]. LCSW stated that due to pt's limited cognitive abilities, he would not be able to complete a Power of LUKE SERA, both medical and durable. She expressed understanding and agreement. LCSW explained that as far as medical decision making, in the state of [place], she would be his legal next of kin to make medical decisions on his behalf since she is his spouse. Moira Cobb requested a letter from pt's PCP stating that he does not have capacity to complete a Power of  document due to his diagnoses. She does not know if a letter would be helpful, but she is willing to try, because it has been frustrating for her to explain to individuals through the [employer] [employer] [facility] [employer] that he cannot sign documents independently. NUBIA also talked with Moira Cbob briefly about guardianship in [place], in the event that there are documents outside of the medical field that would require guardianship since he cannot complete a durable POA. LCSW to visit with pt next week after letter from PCP has been completed. LCSW spoke with Alisha Martinez NP, about Lillian's request for letter stating pt does not have capacity to sign documents for himself due to his diagnoses.      JEANNINE Akers, LISAW  Licensed Clinical   [facility] [place]  (G) [phone]  (D) [phone]

## 2023-09-20 ENCOUNTER — TELEPHONE (OUTPATIENT)
Facility: CLINIC | Age: 78
End: 2023-09-20

## 2023-09-20 NOTE — TELEPHONE ENCOUNTER
Chaya Crowell is the patient's spouse. She asks for a callback from April. No other information given.   # [phone]

## 2023-09-20 NOTE — TELEPHONE ENCOUNTER
Telephone call returned to wife Radha Lopez. She reports that for the past several days she has noted patient twitching, especially lower extremities with left more than right. She noted that left arm twitches as well but notes this may be because leg movement causes arm movement. She states that it is not enough movement to be a jerk but more of a twitch. She states that since she last called me about twitching along his jaw line there has been no more twitching in this area. She states that it does not appear to cause him any discomfort, his LOC remains unchanged. He will still speak to her and take liquids after twitch subsides. These are occurring about 3 - 4 x day both at night and during day, in bed and in wheelchair. Advised that this nurse will speak to provider and call her back with any recommendations.

## 2023-09-21 ENCOUNTER — OFFICE VISIT (OUTPATIENT)
Facility: CLINIC | Age: 78
End: 2023-09-21

## 2023-09-21 ENCOUNTER — TELEPHONE (OUTPATIENT)
Facility: CLINIC | Age: 78
End: 2023-09-21

## 2023-09-21 VITALS
SYSTOLIC BLOOD PRESSURE: 126 MMHG | RESPIRATION RATE: 16 BRPM | DIASTOLIC BLOOD PRESSURE: 80 MMHG | TEMPERATURE: 97.9 F | HEART RATE: 73 BPM | OXYGEN SATURATION: 97 %

## 2023-09-21 DIAGNOSIS — G72.9 MYOPATHY, UNSPECIFIED: ICD-10-CM

## 2023-09-21 DIAGNOSIS — G40.909 SEIZURE DISORDER (HCC): Primary | ICD-10-CM

## 2023-09-21 DIAGNOSIS — G25.3 MYOCLONIC JERKING: Primary | ICD-10-CM

## 2023-09-21 DIAGNOSIS — I69.359 HEMIPLEGIA OF NONDOMINANT SIDE AS LATE EFFECT OF CEREBROVASCULAR ACCIDENT (CVA) (HCC): ICD-10-CM

## 2023-09-21 DIAGNOSIS — F03.C0 SEVERE DEMENTIA WITHOUT BEHAVIORAL DISTURBANCE, PSYCHOTIC DISTURBANCE, MOOD DISTURBANCE, OR ANXIETY, UNSPECIFIED DEMENTIA TYPE (HCC): ICD-10-CM

## 2023-09-21 DIAGNOSIS — Z86.73 HISTORY OF STROKE: ICD-10-CM

## 2023-09-21 DIAGNOSIS — G25.3 MYOCLONIC JERKING: ICD-10-CM

## 2023-09-21 DIAGNOSIS — G40.909 SEIZURE DISORDER (HCC): ICD-10-CM

## 2023-09-21 LAB
ALBUMIN SERPL-MCNC: 3.1 G/DL (ref 3.5–5)
ALBUMIN/GLOB SERPL: 0.7 (ref 1.1–2.2)
ALP SERPL-CCNC: 161 U/L (ref 45–117)
ALT SERPL-CCNC: 10 U/L (ref 12–78)
ANION GAP SERPL CALC-SCNC: 7 MMOL/L (ref 5–15)
AST SERPL-CCNC: 5 U/L (ref 15–37)
BILIRUB SERPL-MCNC: 0.3 MG/DL (ref 0.2–1)
BUN SERPL-MCNC: 16 MG/DL (ref 6–20)
BUN/CREAT SERPL: 9 (ref 12–20)
CALCIUM SERPL-MCNC: 9 MG/DL (ref 8.5–10.1)
CHLORIDE SERPL-SCNC: 104 MMOL/L (ref 97–108)
CO2 SERPL-SCNC: 23 MMOL/L (ref 21–32)
CREAT SERPL-MCNC: 1.74 MG/DL (ref 0.7–1.3)
ERYTHROCYTE [DISTWIDTH] IN BLOOD BY AUTOMATED COUNT: 12.7 % (ref 11.5–14.5)
GLOBULIN SER CALC-MCNC: 4.3 G/DL (ref 2–4)
GLUCOSE SERPL-MCNC: 114 MG/DL (ref 65–100)
HCT VFR BLD AUTO: 33.7 % (ref 36.6–50.3)
HGB BLD-MCNC: 11.1 G/DL (ref 12.1–17)
MAGNESIUM SERPL-MCNC: 1.8 MG/DL (ref 1.6–2.4)
MCH RBC QN AUTO: 31.5 PG (ref 26–34)
MCHC RBC AUTO-ENTMCNC: 32.9 G/DL (ref 30–36.5)
MCV RBC AUTO: 95.7 FL (ref 80–99)
NRBC # BLD: 0 K/UL (ref 0–0.01)
NRBC BLD-RTO: 0 PER 100 WBC
PLATELET # BLD AUTO: 205 K/UL (ref 150–400)
PMV BLD AUTO: 11.4 FL (ref 8.9–12.9)
POTASSIUM SERPL-SCNC: 5.1 MMOL/L (ref 3.5–5.1)
PROT SERPL-MCNC: 7.4 G/DL (ref 6.4–8.2)
RBC # BLD AUTO: 3.52 M/UL (ref 4.1–5.7)
SODIUM SERPL-SCNC: 134 MMOL/L (ref 136–145)
TSH SERPL DL<=0.05 MIU/L-ACNC: 1.97 UIU/ML (ref 0.36–3.74)
WBC # BLD AUTO: 7.9 K/UL (ref 4.1–11.1)

## 2023-09-21 NOTE — ASSESSMENT & PLAN NOTE
S/t to stroke in 2017 and 2022. Recently worked with PT but unable to significantly progress due to inability to follow commands. Wife continues with daily transfers to chair via Pastor Butter, range of motion exercises and frequent repositioning in bed.

## 2023-09-21 NOTE — ASSESSMENT & PLAN NOTE
Major stroke 6/27/17 found on floor with L-sided weakness and dysarthria, intubated but did not receive TPA due to unknown time of onset. Per chart review, also had stroke in 2022 during series of hospitalization and rehabilitation events (also with Covid-19 at the time). Has residual L-sided spastic hemiplegia and dysphagia. Previously saw neurologist Dr. Flores Becerra, neurologist in [facility] but is no longer able to attend outpatient appointments. Managed on aspirin 81mg, atorvastatin 40mg.

## 2023-09-21 NOTE — ASSESSMENT & PLAN NOTE
Secondary to stroke. 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 Last known seizure activity Nov 28, 2022 with subsequent hospitalization. Seizure occurred in setting of UTI and patient had not had medication for about 2 weeks. Seen by Dr. Conrad Hutton, neurologist, during hospitalization and he is currently managed on Keppra 500mg BID (liquid). Keppra level obtained 6/2023 and WNL. No longer followed by neurologist as he is unable to leave the home for outpatient appointments.

## 2023-09-21 NOTE — TELEPHONE ENCOUNTER
Telephone call to wife Toby Stone to advise that Sarina Nath NP can visit pt this afternoon to assess twitching. Toby Stone agrees to visit but cautions that team SW will be in home for 12 noon visit. Notified Carolyn to schedule.

## 2023-09-21 NOTE — PROGRESS NOTES
[address] [facility]    [phone]     Date of Current Visit: 9/21/2023        SUMMARY       Patient seen in the home today due to taxing effort to seek primary care services in the community s/t advanced dementia, bedbound status s/t stroke     Patient/Family present for Current Visit:  Patient, wife Cj Hassan, son Randa Mandujano of Care as stated by the patient/family is: Improve current functional status, would want hospitalization if indicated         ASSESSMENT AND PLAN       1. Myoclonic jerking    2. Severe dementia without behavioral disturbance, psychotic disturbance, mood disturbance, or anxiety, unspecified dementia type ([address])    3. Seizure disorder ([address])    4. 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 History of stroke            1. Myoclonic jerking  Assessment & Plan: Wife endorses this occurred on L side of his face (resolved x several weeks) and his left leg (present at visit today). Movement appear involuntary and do not seem to be causing any pain or distress. Per wife they seem to have worsened when he worked with PT. Not on any acetylcholinesterase inhibitors. Discussed most likely s/t neurodegenerative process (dementia and history of stroke) with cortical involvement, worsened by dehydration and muscle fatigue or worsening contractures. Labs including CBC, CMP, Mg, TSH obtained today with anemia and kidney function stable, electrolytes and thyroid WNL, no signs of infection or new abnormalities. Encouraged hydration, ROM exercises. .    2. Severe dementia without behavioral disturbance, psychotic disturbance, mood disturbance, or anxiety, unspecified dementia type [facility])  Assessment & Plan:  Likely with vascular component given history of multiple strokes. MRI 11/2022 significant for \"Severe chronic microvascular ischemic change and severe cerebral atrophy. \"  Patient says only a few words, does not follow commands, very little

## 2023-09-21 NOTE — ASSESSMENT & PLAN NOTE
Likely with vascular component given history of multiple strokes. MRI 11/2022 significant for \"Severe chronic microvascular ischemic change and severe cerebral atrophy. \"  Patient says only a few words, does not follow commands, very little meaningful movement, reliant on family for total care. Previously saw neurologist in [place] but does not have neurologist in [facility] and wife does not believe it would be realistic for him to attend outpatient appointments. Previously managed on donepezil(no longer taking) and memantine (dc due to diarrhea).

## 2023-09-21 NOTE — ASSESSMENT & PLAN NOTE
Wife endorses this occurred on L side of his face (resolved x several weeks) and his left leg (present at visit today). Movement appear involuntary and do not seem to be causing any pain or distress. Per wife they seem to have worsened when he worked with PT. Not on any acetylcholinesterase inhibitors. Discussed most likely s/t neurodegenerative process (dementia and history of stroke) with cortical involvement, worsened by dehydration and muscle fatigue or worsening contractures. Labs including CBC, CMP, Mg, TSH obtained today with anemia and kidney function stable, electrolytes and thyroid WNL, no signs of infection or new abnormalities. Encouraged hydration, ROM exercises. Shantell Mg

## 2023-09-22 ENCOUNTER — TELEPHONE (OUTPATIENT)
Facility: CLINIC | Age: 78
End: 2023-09-22

## 2023-09-22 NOTE — TELEPHONE ENCOUNTER
Telephone call returned to wife Micheal Hanna to advise that provider Annabella Zamora wanted her to know that patient's labs were all stable and she could see no reason for the twitching. Micheal Hanna reports that he has twitched more today than before but it does not seem to cause him any pain. She is glad to know that his labs are okay.

## 2023-09-26 ENCOUNTER — TELEPHONE (OUTPATIENT)
Facility: CLINIC | Age: 78
End: 2023-09-26

## 2023-09-26 NOTE — TELEPHONE ENCOUNTER
Monika Shay is the patient's spouse. She called to request a callback from Humza Maurice NP. I let her know that the NP is not in the office today but that I would send a message to her clinical team.  Radha Lopez acknowledged and stated that it was not urgent. No other info given.   # [phone]

## 2023-09-27 NOTE — TELEPHONE ENCOUNTER
Telephone call returned to wife Tim Kiran, she reports that she is concerned about patient's thick white sputum that she pulls out of his mouth every morning. She states when he first wakes up he coughs on and off for a while. Hilda Booth that when provider saw pt last week his lungs were clear. Discussed that he needs to be upright at 90 degree when being fed and wife assures me this is being done. There are no changes in amount of mucous, no color to mucous, no fever, no increased cough. Tim Kiran had more questions about whether or not she can crush all of his medications, we reviewed all meds and advised that it is safe to crush all meds. She was also concerned about his B12 pill color changing to white the last time it was filled. Reassured her that pill color may change depending on the  of the medications. We reviewed s/s to report with change in sputum color, increased cough/congestion, fever.

## 2023-10-09 ENCOUNTER — TELEPHONE (OUTPATIENT)
Facility: CLINIC | Age: 78
End: 2023-10-09

## 2023-10-09 NOTE — TELEPHONE ENCOUNTER
Moira Cobb called and stated that she received a bill for the patient's wheel chair from the insurance and the wheel chair company. She asked if we had submitted the wheel chair order to the insurance. I let her know that the DME company would have submitted the claim when they received the order with the patient's insurance information. She said she would call the wheel chair company and ask them about the bill.   Her callback if needed is [phone]

## 2023-10-13 RX ORDER — CARVEDILOL 25 MG/1
25 TABLET ORAL 2 TIMES DAILY WITH MEALS
Qty: 180 TABLET | Refills: 1 | Status: SHIPPED | OUTPATIENT
Start: 2023-10-13

## 2023-10-23 ENCOUNTER — TELEPHONE (OUTPATIENT)
Facility: CLINIC | Age: 78
End: 2023-10-23

## 2023-10-23 DIAGNOSIS — R05.1 ACUTE COUGH: Primary | ICD-10-CM

## 2023-10-23 NOTE — TELEPHONE ENCOUNTER
Colt Renee called to update [place] NP that the patient had a choking incident on Thursday last week, 10/19/23. She said that he slumped down in his chair, lip drooping and mouth open. She said that he also vomited quite a bit. She said that the patient is back to normal self now but seem sitred. She states that she is allowing him to stay in bed longer.     She asks for a callback at [phone]

## 2023-10-23 NOTE — TELEPHONE ENCOUNTER
Telephone call to wife Lisbet Mercado to advise that this nurse spoke with Vilma Carrasco NP and she is ordering a chest x-ray to make sure patient did not aspirate at time of choking episode. Wife in agreement with plan. Chest x-ray order sent to [facility].

## 2023-10-23 NOTE — TELEPHONE ENCOUNTER
Telephone call returned to wife Branden Bassett. She reports that on 10/19 she was feeding pt small bits of pepperoni and he seemed to do fine with them. She then glanced back at him and he had dropped his head down, his mouthy was open and he was very pale. Branden Bassett performed Heimlich and he started throwing up (quite a bit) and then returned to normal.  She states he has a rattling cough mostly when being fed but 2 or 3 times during the night as well. She states this was happening before choking spell last week. She states he had loose stools for a couple of days and now back to normal.  She denies fever, no sweats, states he has had no more shaking spells for the last 2 weeks. She is only leaving him up in wheelchair for about 2 hours before putting back to bed. She says that his behavior is no different, she is just letting him rest more. She has returned his diet to blenderized feedings. Advised this nurse will speak with provider about this call and call her back with any recommendations.

## 2023-10-24 ENCOUNTER — TELEPHONE (OUTPATIENT)
Facility: CLINIC | Age: 78
End: 2023-10-24

## 2023-10-24 NOTE — TELEPHONE ENCOUNTER
LCSW rec'd message from [facility] office that spouse had called with questions about a notary for annuity paperwork. LCSW called spouse. She informed that she had papers for pt's annuity, life insurance, and health insurance, and she needed a letter saying that he could not sign paperwork so she would sign for him. LCSW reminded her that [facility] team provided a letter of this nature to her recently. She said that she would like an additional letter from the LCSW to submit along with the letter from the provider. LCSW inquired what the forms that she rec'd said, so that LCSW can better understand what the goal is, but she had a difficult time explaining the forms and said it would be more helpful for LCSW to visit in-person and look at the paperwork she rec'd.  LCSW to visit on Monday 10/30/23 @ 2:30pm.     JEANNINE Hong, LCSW  Licensed Clinical   [facility]  [phone]  [phone]

## 2023-10-26 ENCOUNTER — TELEPHONE (OUTPATIENT)
Facility: CLINIC | Age: 78
End: 2023-10-26

## 2023-10-26 NOTE — TELEPHONE ENCOUNTER
Victorino Gitelman is the patient's spouse. She called seeking the results of the patient's recent xray with [facility].     Lillian's CB# [phone]

## 2023-10-27 DIAGNOSIS — R05.1 ACUTE COUGH: ICD-10-CM

## 2023-10-27 NOTE — TELEPHONE ENCOUNTER
Telephone call to wife Patito Rodriguez to advise that we had to call [facility] to get results of chest x-ray and we just received them. Advised that x-ray shows no evidence of pneumonia. She states he is feeling better, acting normal.  She reports that she has gone back to pureeing his food and he is fine with that and eats everything. She states he coughs a couple of times during the day and a couple of times at night. No fever, no SOB. Patito Rodriguez mentioned again that she would like for him to have PT again. Rock Adhikariy that I don't think he would be eligible for more PT as he has had HH PT 3 times in the short time that we have been his PCP. Discussed that patient is unable to meet PT goals and most they could do is to teach Patito Rodriguez a home exercise program and they have done that 3 times. Advised this nurse will let provider know her request and they can discuss it at time of next home visit. Patito Rodriguez was in agreement with this plan and appreciative of call with results of x-ray.

## 2023-10-30 ENCOUNTER — TELEPHONE (OUTPATIENT)
Facility: CLINIC | Age: 78
End: 2023-10-30

## 2023-10-30 ENCOUNTER — OFFICE VISIT (OUTPATIENT)
Facility: CLINIC | Age: 78
End: 2023-10-30

## 2023-10-30 DIAGNOSIS — Z76.89 ENCOUNTER FOR SOCIAL WORK INTERVENTION: Primary | ICD-10-CM

## 2023-10-30 NOTE — TELEPHONE ENCOUNTER
Called patient to confirm their in home visit with Monico Dakin on 11/3/23, arrival between 9-1. Spoke with wife, they confirmed the appointment and answered the covid screen questions.  Does anyone in the household have covid no  Have there been any changes to insurance no or location no

## 2023-10-30 NOTE — TELEPHONE ENCOUNTER
I called wife to remind of their in home visit w/ Hank Busch on 11/3/23, arrival between 9-1. I reached wife's VM and left a message to please call the [address] [place] [address] office to confirm the visit. Quality 110: Preventive Care And Screening: Influenza Immunization: Influenza immunization was not ordered or administered, reason not given Detail Level: Detailed

## 2023-10-31 SDOH — ECONOMIC STABILITY: FOOD INSECURITY: WITHIN THE PAST 12 MONTHS, YOU WORRIED THAT YOUR FOOD WOULD RUN OUT BEFORE YOU GOT MONEY TO BUY MORE.: NEVER TRUE

## 2023-10-31 SDOH — ECONOMIC STABILITY: TRANSPORTATION INSECURITY
IN THE PAST 12 MONTHS, HAS THE LACK OF TRANSPORTATION KEPT YOU FROM MEDICAL APPOINTMENTS OR FROM GETTING MEDICATIONS?: NO

## 2023-10-31 SDOH — ECONOMIC STABILITY: INCOME INSECURITY: IN THE LAST 12 MONTHS, WAS THERE A TIME WHEN YOU WERE NOT ABLE TO PAY THE MORTGAGE OR RENT ON TIME?: NO

## 2023-10-31 SDOH — HEALTH STABILITY: PHYSICAL HEALTH: ON AVERAGE, HOW MANY MINUTES DO YOU ENGAGE IN EXERCISE AT THIS LEVEL?: 0 MIN

## 2023-10-31 SDOH — ECONOMIC STABILITY: HOUSING INSECURITY: IN THE LAST 12 MONTHS, HOW MANY PLACES HAVE YOU LIVED?: 1

## 2023-10-31 SDOH — HEALTH STABILITY: PHYSICAL HEALTH: ON AVERAGE, HOW MANY DAYS PER WEEK DO YOU ENGAGE IN MODERATE TO STRENUOUS EXERCISE (LIKE A BRISK WALK)?: 0 DAYS

## 2023-10-31 SDOH — ECONOMIC STABILITY: INCOME INSECURITY: HOW HARD IS IT FOR YOU TO PAY FOR THE VERY BASICS LIKE FOOD, HOUSING, MEDICAL CARE, AND HEATING?: NOT VERY HARD

## 2023-10-31 SDOH — ECONOMIC STABILITY: FOOD INSECURITY: WITHIN THE PAST 12 MONTHS, THE FOOD YOU BOUGHT JUST DIDN'T LAST AND YOU DIDN'T HAVE MONEY TO GET MORE.: NEVER TRUE

## 2023-10-31 NOTE — PROGRESS NOTES
[employer] [facility] at [address] [facility]    Patient name: Jerome Sampson    Date of visit: 10/30/23    Session today completed with: Joana Roger    Relationship to patient: Spouse, pt    Purpose of visit: Review paperwork that spouse rec'd re: documentation needed for Competency Determination    Visit narrative: LCSW visited with pt and spouse in their home. Pt was sitting up in wheelchair in the living room. His eyes were open, but he was unable to engage in conversation. Spouse showed LCSW a paper she obtained from the [employer] senior living Operations outlining the documentation needed for Competency Determination. She said that she was trying to speak with people on behalf of her  who is unable to speak for himself, but she kept running in to challenge of people not understanding or not being able to speak with her without his consent, which he cannot give. She is requesting a notarized letter from the LCSW stating that pt is unable to speak for himself. Per the paper from \[facility] [place] [facility]\"" that she gave to LCSW, the letter can be an affadavit from at least 2 people who know facts concerning the pt's competency, preferably one from a family members, and one from a non-family members (LCSW). LCSW to type letter and having notarized. Spouse is requesting \"at least 2 wet copies\". LCSW will work on this task later this week. She said there is no rush, but in the next few weeks would be helpful. Plan for follow up: LCSW to remain available for support and resources as needed.        Ida Arreola, MSW, LCSW    Primary Care at Home  (D) [phone]

## 2023-11-03 ENCOUNTER — OFFICE VISIT (OUTPATIENT)
Facility: CLINIC | Age: 78
End: 2023-11-03

## 2023-11-03 VITALS
SYSTOLIC BLOOD PRESSURE: 136 MMHG | DIASTOLIC BLOOD PRESSURE: 82 MMHG | HEART RATE: 69 BPM | OXYGEN SATURATION: 96 % | RESPIRATION RATE: 16 BRPM | TEMPERATURE: 98.4 F

## 2023-11-03 DIAGNOSIS — N18.32 STAGE 3B CHRONIC KIDNEY DISEASE (CKD) (HCC): ICD-10-CM

## 2023-11-03 DIAGNOSIS — Z86.73 HISTORY OF STROKE: ICD-10-CM

## 2023-11-03 DIAGNOSIS — G25.3 MYOCLONIC JERKING: ICD-10-CM

## 2023-11-03 DIAGNOSIS — G40.909 SEIZURE DISORDER (HCC): ICD-10-CM

## 2023-11-03 DIAGNOSIS — I69.359 HEMIPLEGIA OF NONDOMINANT SIDE AS LATE EFFECT OF CEREBROVASCULAR ACCIDENT (CVA) (HCC): ICD-10-CM

## 2023-11-03 DIAGNOSIS — E78.2 MIXED HYPERLIPIDEMIA: ICD-10-CM

## 2023-11-03 DIAGNOSIS — I10 PRIMARY HYPERTENSION: Primary | ICD-10-CM

## 2023-11-03 DIAGNOSIS — F03.C0 SEVERE DEMENTIA WITHOUT BEHAVIORAL DISTURBANCE, PSYCHOTIC DISTURBANCE, MOOD DISTURBANCE, OR ANXIETY, UNSPECIFIED DEMENTIA TYPE (HCC): ICD-10-CM

## 2023-11-03 DIAGNOSIS — D64.9 NORMOCYTIC ANEMIA: ICD-10-CM

## 2023-11-03 DIAGNOSIS — R13.11 ORAL PHASE DYSPHAGIA: ICD-10-CM

## 2023-11-03 DIAGNOSIS — K21.9 GASTROESOPHAGEAL REFLUX DISEASE WITHOUT ESOPHAGITIS: ICD-10-CM

## 2023-11-03 ASSESSMENT — PATIENT HEALTH QUESTIONNAIRE - PHQ9: DEPRESSION UNABLE TO ASSESS: FUNCTIONAL CAPACITY MOTIVATION LIMITS ACCURACY

## 2023-11-03 NOTE — ASSESSMENT & PLAN NOTE
Last renal panel Sept 2023 with Cr 1.74, eGFR 39. Previously saw nephrologist Dr. Shanna Mead, last visit 2019 with baseline Cr about 1.7. Avoid nephrotoxic medications and consider renal dosing.   Not able to follow up with nephrology outpatient at this time

## 2023-11-03 NOTE — ASSESSMENT & PLAN NOTE
Last CBC in Sept 2023 with Hgb 11.1, normocytic normochromic. Iron and ferritin WNL March 2023. He is currently taking K23, folic acid, and ferrous sulfate daily. No recent bleeding  events, taking aspirin 81mg daily for secondary stroke prevention and pantoprazole for GI protection.

## 2023-11-03 NOTE — PROGRESS NOTES
09/21/2023 11:48 AM    RDW 12.7 09/21/2023 11:48 AM     09/21/2023 11:48 AM    WBC 7.9 09/21/2023 11:48 AM     Lab Results   Component Value Date/Time    CREATININE 1.74 (H) 09/21/2023 11:48 AM    BUN 16 09/21/2023 11:48 AM    EGFR 42 (L) 03/24/2023 08:22 AM     (L) 09/21/2023 11:48 AM    K 5.1 09/21/2023 11:48 AM    CALCIUM 9.0 09/21/2023 11:48 AM    CO2 23 09/21/2023 11:48 AM     09/21/2023 11:48 AM    AST 5 (L) 09/21/2023 11:48 AM    ALT 10 (L) 09/21/2023 11:48 AM    ALKPHOS 161 (H) 09/21/2023 11:48 AM    ALKPHOS 151 (H) 03/24/2023 08:22 AM    BILITOT 0.3 09/21/2023 11:48 AM    LABALBU 3.1 (L) 09/21/2023 11:48 AM     Lab Results   Component Value Date/Time    LABA1C 5.4 03/24/2023 08:22 AM    TSH 1.62 11/28/2022 03:27 AM    IRON 42 03/24/2023 08:22 AM    FERRITIN 148 03/24/2023 08:22 AM    TIBC 186 (L) 03/24/2023 08:22 AM                THANH Posadas NP

## 2023-11-03 NOTE — ASSESSMENT & PLAN NOTE
S/t to stroke in 2017 and 2022. Worked with PT earlier this year, but unable to significantly progress due to inability to follow commands. Wife continues with daily transfers to chair via John Chol, range of motion exercises and frequent repositioning in bed.

## 2023-11-03 NOTE — ASSESSMENT & PLAN NOTE
Secondary to stroke. 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 Last known seizure activity Nov 28, 2022 with subsequent hospitalization. Seizure occurred in setting of UTI and patient had not had medication for about 2 weeks. Seen by Dr. Theresa Al, neurologist, during hospitalization and he is currently managed on Keppra 500mg BID (liquid). Keppra level obtained 8/2023 and WNL. No longer followed by neurologist as he is unable to leave the home for outpatient appointments.

## 2023-11-03 NOTE — ASSESSMENT & PLAN NOTE
Likely with vascular component given history of multiple strokes. MRI 11/2022 significant for \"Severe chronic microvascular ischemic changes and severe cerebral atrophy. \"  Patient says only a few words, does not follow commands, very little meaningful movement, reliant on family for total care. Previously saw neurologist in [place] but does not have neurologist in [facility] and wife does not believe it would be realistic for him to attend outpatient appointments. Previously managed on donepezil (no longer taking) and memantine (dc due to diarrhea).  No changes to behavior since last visit

## 2023-11-03 NOTE — ASSESSMENT & PLAN NOTE
Seen by SLP during hospitalization 11/18/22 for bedside swallow exam significant for moderate oral dysphagia, recommended pureed diet with thin liquids, supervision for all intake and sit upright for all meals. Worked with home health SLP, has been upgraded to chopped foods.   Episode of choking on a pepperoni since last visit with coughing that persisted- CXR 10/25 negative for pneumonia and coughing has improved

## 2023-11-03 NOTE — ASSESSMENT & PLAN NOTE
Major stroke 6/27/17 found on floor with L-sided weakness and dysarthria, intubated but did not receive TPA due to unknown time of onset. Per chart review, also had stroke in 2022 during series of hospitalization and rehabilitation events (also with Covid-19 at the time). Has residual L-sided spastic hemiplegia and dysphagia. Previously saw neurologist Dr. Stanislav Vargas, neurologist in [facility] but is no longer able to attend outpatient appointments. Managed on aspirin 81mg, atorvastatin 40mg.

## 2023-11-03 NOTE — ASSESSMENT & PLAN NOTE
None since last visit- Wife previously endorsed jerking/twitching of left face and leg (witnessed at last visit). Movement appear involuntary and do not seem to be causing any pain or distress. Per wife they seem to have worsened when he worked with PT. Not on any acetylcholinesterase inhibitors. Discussed most likely s/t neurodegenerative process (dementia and history of stroke) with cortical involvement, worsened by dehydration and muscle fatigue or worsening contractures. Labs including CBC, CMP, Mg, TSH obtained 9/2023 with anemia and kidney function stable, electrolytes and thyroid WNL, no signs of infection or new abnormalities. Encouraged hydration, ROM exercises. Lucas Parks

## 2023-11-07 ENCOUNTER — CLINICAL DOCUMENTATION (OUTPATIENT)
Facility: CLINIC | Age: 78
End: 2023-11-07

## 2023-11-14 ENCOUNTER — TELEPHONE (OUTPATIENT)
Facility: CLINIC | Age: 78
End: 2023-11-14

## 2023-11-14 NOTE — TELEPHONE ENCOUNTER
Lillian called to request a callback from Tatiana Montana regarding paperwork completion for the patient.  Her CB# [phone].

## 2023-11-14 NOTE — TELEPHONE ENCOUNTER
Forms for the Patient - Lillian Benjy called again to remind Tatiana Nan about the forms she is completing for the patient.  She said that Tatiana can mail the forms to her when they are finished.    [facility] - Lillian received a letter from [employer] stating that they were coming to visit the patient.  She read me most of the letter over the phone.  I let her know that this company was based in [place] and we did not send the letter or ask them to send the letter.  I also asked her to hang on to the letter and let a provider, nurse or  take a picture of it.  I also recommended that she not call the company since it is out of state.  She acknowledged.

## 2023-11-22 RX ORDER — FOLIC ACID 1 MG/1
1000 TABLET ORAL DAILY
Qty: 90 TABLET | Refills: 1 | Status: SHIPPED | OUTPATIENT
Start: 2023-11-22

## 2023-12-04 ENCOUNTER — OFFICE VISIT (OUTPATIENT)
Facility: CLINIC | Age: 78
End: 2023-12-04

## 2023-12-04 DIAGNOSIS — Z76.89 ENCOUNTER FOR SOCIAL WORK INTERVENTION: Primary | ICD-10-CM

## 2023-12-04 NOTE — PROGRESS NOTES
[facility] at [address] [facility] Assessment    Patient name: Marla Eli    Date of visit: 12/4/23    Session today completed with: Kaya Davies    Relationship to patient: Spouse, self    Purpose of visit: Routine social work assessment, provide pt's spouse with letter stating pt is under care of [facility] team, pt unable to make informed decisions for himself due to dementia. Visit narrative: LCSW visited with pt, spouse, and 2 sons in the home where he resides with wife and 1 son. Their other son was visiting this afternoon. He lives about 5-10 minutes away, he stated. Pt was sitting up in wheelchair in the living room when LCSW arrived. He opened his eyes, but was unable to engage in conversation or answer simple questions due to advanced dementia. LCSW provided pt's spouse with a letter that she needs to provide to pt's [employer] company, stating that he is under the care of the [facility] program and his wife is his legal next of kin since pt does not have cognitive capacity to make informed decisions for himself. Assessment and Plan: Pt appears to be well taken care of. His wife denied current caregiver burden or distress. She expressed appreciation for the letter and visit today. Plan for follow up: LCSW to remain available for support and resources as needed.        JEANNINE Nelson, [facility]    Primary Care at Home  (D) [phone]

## 2023-12-12 ENCOUNTER — SOCIAL WORK (OUTPATIENT)
Facility: CLINIC | Age: 78
End: 2023-12-12

## 2023-12-12 SDOH — HEALTH STABILITY: PHYSICAL HEALTH: ON AVERAGE, HOW MANY DAYS PER WEEK DO YOU ENGAGE IN MODERATE TO STRENUOUS EXERCISE (LIKE A BRISK WALK)?: 0 DAYS

## 2023-12-12 SDOH — ECONOMIC STABILITY: HOUSING INSECURITY: IN THE LAST 12 MONTHS, HOW MANY PLACES HAVE YOU LIVED?: 1

## 2023-12-12 SDOH — ECONOMIC STABILITY: INCOME INSECURITY: IN THE LAST 12 MONTHS, WAS THERE A TIME WHEN YOU WERE NOT ABLE TO PAY THE MORTGAGE OR RENT ON TIME?: NO

## 2023-12-12 SDOH — ECONOMIC STABILITY: FOOD INSECURITY: WITHIN THE PAST 12 MONTHS, YOU WORRIED THAT YOUR FOOD WOULD RUN OUT BEFORE YOU GOT MONEY TO BUY MORE.: NEVER TRUE

## 2023-12-12 SDOH — ECONOMIC STABILITY: FOOD INSECURITY: WITHIN THE PAST 12 MONTHS, THE FOOD YOU BOUGHT JUST DIDN'T LAST AND YOU DIDN'T HAVE MONEY TO GET MORE.: NEVER TRUE

## 2023-12-12 SDOH — ECONOMIC STABILITY: INCOME INSECURITY: HOW HARD IS IT FOR YOU TO PAY FOR THE VERY BASICS LIKE FOOD, HOUSING, MEDICAL CARE, AND HEATING?: NOT HARD AT ALL

## 2023-12-12 SDOH — HEALTH STABILITY: PHYSICAL HEALTH: ON AVERAGE, HOW MANY MINUTES DO YOU ENGAGE IN EXERCISE AT THIS LEVEL?: 0 MIN

## 2023-12-26 ENCOUNTER — OFFICE VISIT (OUTPATIENT)
Facility: CLINIC | Age: 78
End: 2023-12-26
Payer: MEDICARE

## 2023-12-26 VITALS
OXYGEN SATURATION: 98 % | SYSTOLIC BLOOD PRESSURE: 126 MMHG | DIASTOLIC BLOOD PRESSURE: 62 MMHG | TEMPERATURE: 98.3 F | HEART RATE: 94 BPM | RESPIRATION RATE: 18 BRPM

## 2023-12-26 DIAGNOSIS — N18.32 STAGE 3B CHRONIC KIDNEY DISEASE (CKD) (HCC): ICD-10-CM

## 2023-12-26 DIAGNOSIS — G25.3 MYOCLONIC JERKING: ICD-10-CM

## 2023-12-26 DIAGNOSIS — F03.C0 SEVERE DEMENTIA WITHOUT BEHAVIORAL DISTURBANCE, PSYCHOTIC DISTURBANCE, MOOD DISTURBANCE, OR ANXIETY, UNSPECIFIED DEMENTIA TYPE (HCC): ICD-10-CM

## 2023-12-26 DIAGNOSIS — D64.9 NORMOCYTIC ANEMIA: ICD-10-CM

## 2023-12-26 DIAGNOSIS — R13.11 ORAL PHASE DYSPHAGIA: ICD-10-CM

## 2023-12-26 DIAGNOSIS — I69.359 HEMIPLEGIA OF NONDOMINANT SIDE AS LATE EFFECT OF CEREBROVASCULAR ACCIDENT (CVA) (HCC): ICD-10-CM

## 2023-12-26 DIAGNOSIS — I10 PRIMARY HYPERTENSION: Primary | ICD-10-CM

## 2023-12-26 DIAGNOSIS — Z86.73 HISTORY OF STROKE: ICD-10-CM

## 2023-12-26 DIAGNOSIS — G40.909 SEIZURE DISORDER (HCC): ICD-10-CM

## 2023-12-26 DIAGNOSIS — E78.2 MIXED HYPERLIPIDEMIA: ICD-10-CM

## 2023-12-26 PROCEDURE — 3074F SYST BP LT 130 MM HG: CPT | Performed by: NURSE PRACTITIONER

## 2023-12-26 PROCEDURE — G8421 BMI NOT CALCULATED: HCPCS | Performed by: NURSE PRACTITIONER

## 2023-12-26 PROCEDURE — 1036F TOBACCO NON-USER: CPT | Performed by: NURSE PRACTITIONER

## 2023-12-26 PROCEDURE — 99349 HOME/RES VST EST MOD MDM 40: CPT | Performed by: NURSE PRACTITIONER

## 2023-12-26 PROCEDURE — 1123F ACP DISCUSS/DSCN MKR DOCD: CPT | Performed by: NURSE PRACTITIONER

## 2023-12-26 PROCEDURE — G8484 FLU IMMUNIZE NO ADMIN: HCPCS | Performed by: NURSE PRACTITIONER

## 2023-12-26 PROCEDURE — 3078F DIAST BP <80 MM HG: CPT | Performed by: NURSE PRACTITIONER

## 2023-12-26 NOTE — ASSESSMENT & PLAN NOTE
Last CBC in Sept 2023 with Hgb 11.1, normocytic normochromic. Iron and ferritin WNL March 2023. He is currently taking E34, folic acid, and ferrous sulfate daily. No recent bleeding  events, taking aspirin 81mg daily for secondary stroke prevention and pantoprazole for GI protection.   Continue current plan

## 2023-12-26 NOTE — ASSESSMENT & PLAN NOTE
Major stroke 6/27/17 with subsequent L-sided weakness and dysarthria, intubated but did not receive TPA due to unknown time of onset. Per chart review, also had stroke in 2022 during series of hospitalization and rehabilitation events (also with Covid-19 at the time). Has residual L-sided spastic hemiplegia and dysphagia. Previously saw neurologist Dr. Enoc Triana, neurologist in [facility] but is no longer able to attend outpatient appointments. Managed on aspirin 81mg, atorvastatin 40mg.

## 2023-12-26 NOTE — ASSESSMENT & PLAN NOTE
Likely with vascular component given history of multiple strokes. MRI 11/2022 significant for \"Severe chronic microvascular ischemic changes and severe cerebral atrophy. \"  Patient says only a few words, does not follow commands, very little meaningful movement, reliant on family for total care. Previously saw neurologist in [place] but does not have neurologist in [facility] and wife does not believe it would be realistic for him to attend outpatient appointments. Previously managed on donepezil (no longer taking) and memantine (dc due to diarrhea).  No changes to behavior or apparent decline since last visit

## 2023-12-26 NOTE — ASSESSMENT & PLAN NOTE
Seen by SLP during hospitalization 11/18/22 for bedside swallow exam significant for moderate oral dysphagia, recommended pureed diet with thin liquids, supervision for all intake and sit upright for all meals. Worked with home health SLP, has been upgraded to chopped foods. Episode of possible aspiration in Oct with subsequent cough, CXR 10/25 negative for pneumonia and coughing has since improved.   No aspiration events since last visit, eating primarily pureed foods

## 2023-12-26 NOTE — ASSESSMENT & PLAN NOTE
Secondary to stroke. 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 Last known seizure activity Nov 28, 2022 with subsequent hospitalization. Seizure occurred in setting of UTI and patient had not had medication for about 2 weeks. Seen by Dr. Saint Ghazi, neurologist, during hospitalization and he is currently managed on Keppra 500mg BID (liquid). Keppra level obtained 8/2023 and WNL.   No longer followed by neurologist as he is unable to leave the home

## 2023-12-26 NOTE — ASSESSMENT & PLAN NOTE
Intermittent and primarily in L leg. Movements appear involuntary and do not seem to be causing any pain or distress. Not on any acetylcholinesterase inhibitors. Discussed most likely s/t neurodegenerative process (dementia and history of stroke) with cortical involvement, worsened by dehydration and muscle fatigue or worsening contractures. Labs including CBC, CMP, Mg, TSH, Keppra level obtained 8-9/2023 with anemia and kidney function stable, electrolytes and thyroid WNL, no signs of infection or new abnormalities. Continue to encouraged hydration, ROM exercises.   No jerking noted by wife since last visit

## 2023-12-26 NOTE — ASSESSMENT & PLAN NOTE
Last renal panel 9/2023 with Cr 1.74, eGFR 39. Previously saw nephrologist Dr. Jaylen Seo, last visit 2019 with baseline Cr about 1.7. Avoid nephrotoxic medications and consider renal dosing.   Not able to follow up with nephrology outpatient at this time

## 2023-12-26 NOTE — PROGRESS NOTES
involuntary and do not seem to be causing any pain or distress. Not on any acetylcholinesterase inhibitors. Discussed most likely s/t neurodegenerative process (dementia and history of stroke) with cortical involvement, worsened by dehydration and muscle fatigue or worsening contractures. Labs including CBC, CMP, Mg, TSH, Keppra level obtained 8-9/2023 with anemia and kidney function stable, electrolytes and thyroid WNL, no signs of infection or new abnormalities. Continue to encouraged hydration, ROM exercises. No jerking noted by wife since last visit  8. Stage 3b chronic kidney disease (CKD) ([address])  Assessment & Plan:  Last renal panel 9/2023 with Cr 1.74, eGFR 39. Previously saw nephrologist Dr. Federico Yun, last visit 2019 with baseline Cr about 1.7. Avoid nephrotoxic medications and consider renal dosing. Not able to follow up with nephrology outpatient at this time  9. Mixed hyperlipidemia  Assessment & Plan:  Last Lipid panel March 2023 at goal on atorvastatin 40mg. 10. Normocytic anemia  Assessment & Plan:  Last CBC in Sept 2023 with Hgb 11.1, normocytic normochromic. Iron and ferritin WNL March 2023. He is currently taking T59, folic acid, and ferrous sulfate daily. No recent bleeding  events, taking aspirin 81mg daily for secondary stroke prevention and pantoprazole for GI protection. Continue current plan     -Labs: CBC, CMP, TSH, Mg on 9/21/23, Keppra level 8/2023   -AMD/Code status: No AMD on file and patient is unable to speak for himself at this time. Wife and son preparing will through  currently. Wife would want full code, full scope of interventions- \"I want everything, everything done\"   -Follow up:  In 6 weeks (2/5/24), sooner as needed    Health Maintenance Due   Topic Date Due    DTaP/Tdap/Td vaccine (1 - Tdap) Never done    Shingles vaccine (1 of 2) Never done    Respiratory Syncytial Virus (RSV) Pregnant or age 61 yrs+ (1 - 1-dose 60+ series) Never done    COVID-19 Vaccine

## 2023-12-26 NOTE — ASSESSMENT & PLAN NOTE
S/t to stroke in 2017 and 2022. Worked with PT in the past year, but unable to significantly progress due to inability to follow commands. Wife continues with daily transfers to chair via [place] Dark, range of motion exercises and frequent repositioning in bed.

## 2024-01-26 RX ORDER — ATORVASTATIN CALCIUM 40 MG/1
40 TABLET, FILM COATED ORAL DAILY
Qty: 90 TABLET | Refills: 1 | Status: SHIPPED | OUTPATIENT
Start: 2024-01-26

## 2024-01-31 ENCOUNTER — TELEPHONE (OUTPATIENT)
Facility: CLINIC | Age: 79
End: 2024-01-31

## 2024-01-31 NOTE — TELEPHONE ENCOUNTER
I called patient to remind of their in home visit w/ Abida Sloan on 2/5/2024, arrival between 9-1.  I reached wife VM and left a message to please call the [facility] office to confirm the visit.

## 2024-02-05 ENCOUNTER — OFFICE VISIT (OUTPATIENT)
Facility: CLINIC | Age: 79
End: 2024-02-05
Payer: MEDICARE

## 2024-02-05 VITALS
DIASTOLIC BLOOD PRESSURE: 86 MMHG | OXYGEN SATURATION: 99 % | RESPIRATION RATE: 18 BRPM | SYSTOLIC BLOOD PRESSURE: 144 MMHG | TEMPERATURE: 97.9 F | HEART RATE: 68 BPM

## 2024-02-05 DIAGNOSIS — D64.9 NORMOCYTIC ANEMIA: ICD-10-CM

## 2024-02-05 DIAGNOSIS — G40.909 SEIZURE DISORDER (HCC): ICD-10-CM

## 2024-02-05 DIAGNOSIS — I69.359 HEMIPLEGIA OF NONDOMINANT SIDE AS LATE EFFECT OF CEREBROVASCULAR ACCIDENT (CVA) (HCC): ICD-10-CM

## 2024-02-05 DIAGNOSIS — G25.3 MYOCLONIC JERKING: ICD-10-CM

## 2024-02-05 DIAGNOSIS — F03.C0 SEVERE DEMENTIA WITHOUT BEHAVIORAL DISTURBANCE, PSYCHOTIC DISTURBANCE, MOOD DISTURBANCE, OR ANXIETY, UNSPECIFIED DEMENTIA TYPE (HCC): ICD-10-CM

## 2024-02-05 DIAGNOSIS — Z86.73 HISTORY OF STROKE: ICD-10-CM

## 2024-02-05 DIAGNOSIS — R13.11 ORAL PHASE DYSPHAGIA: ICD-10-CM

## 2024-02-05 DIAGNOSIS — I10 PRIMARY HYPERTENSION: Primary | ICD-10-CM

## 2024-02-05 DIAGNOSIS — N18.32 STAGE 3B CHRONIC KIDNEY DISEASE (CKD) (HCC): ICD-10-CM

## 2024-02-05 PROBLEM — L89.152 PRESSURE INJURY OF SACRAL REGION, STAGE 2 (HCC): Status: RESOLVED | Noted: 2023-03-23 | Resolved: 2024-02-05

## 2024-02-05 PROCEDURE — 1123F ACP DISCUSS/DSCN MKR DOCD: CPT | Performed by: NURSE PRACTITIONER

## 2024-02-05 PROCEDURE — 3079F DIAST BP 80-89 MM HG: CPT | Performed by: NURSE PRACTITIONER

## 2024-02-05 PROCEDURE — G8421 BMI NOT CALCULATED: HCPCS | Performed by: NURSE PRACTITIONER

## 2024-02-05 PROCEDURE — 1036F TOBACCO NON-USER: CPT | Performed by: NURSE PRACTITIONER

## 2024-02-05 PROCEDURE — 3077F SYST BP >= 140 MM HG: CPT | Performed by: NURSE PRACTITIONER

## 2024-02-05 PROCEDURE — 99349 HOME/RES VST EST MOD MDM 40: CPT | Performed by: NURSE PRACTITIONER

## 2024-02-05 PROCEDURE — G8484 FLU IMMUNIZE NO ADMIN: HCPCS | Performed by: NURSE PRACTITIONER

## 2024-02-05 ASSESSMENT — PATIENT HEALTH QUESTIONNAIRE - PHQ9
SUM OF ALL RESPONSES TO PHQ QUESTIONS 1-9: 0
SUM OF ALL RESPONSES TO PHQ QUESTIONS 1-9: 0
DEPRESSION UNABLE TO ASSESS: FUNCTIONAL CAPACITY MOTIVATION LIMITS ACCURACY
1. LITTLE INTEREST OR PLEASURE IN DOING THINGS: 0
SUM OF ALL RESPONSES TO PHQ QUESTIONS 1-9: 0
SUM OF ALL RESPONSES TO PHQ QUESTIONS 1-9: 0
SUM OF ALL RESPONSES TO PHQ9 QUESTIONS 1 & 2: 0

## 2024-02-05 NOTE — ASSESSMENT & PLAN NOTE
-Last CBC in Sept 2023 with Hgb 11.1, normocytic normochromic  -Iron and ferritin WNL March 2023  -Currently taking B12, folic acid, and ferrous sulfate daily  -taking aspirin 81mg daily for secondary stroke prevention and pantoprazole for GI protection  -No recent bleeding events

## 2024-02-05 NOTE — ASSESSMENT & PLAN NOTE
-S/t to stroke in 2017 and 2022  -Wife continues with daily transfers to chair via Alli, range of motion exercises  -continue skin protective measures, frequent repositioning

## 2024-02-05 NOTE — ASSESSMENT & PLAN NOTE
-Movements appear involuntary and do not seem to be causing any pain or distress  -Not on any acetylcholinesterase inhibitors.    -most likely s/t neurodegenerative process (dementia and history of stroke) with cortical involvement, worsened by dehydration and muscle fatigue or worsening contractures  -Labs including CBC, CMP, Mg, TSH, Keppra level obtained 8-9/2023 with anemia and kidney function stable, electrolytes and thyroid WNL, no signs of infection or new abnormalities  -Continue to encouraged hydration, ROM exercises  -No jerking noted by wife since last visit

## 2024-02-05 NOTE — ASSESSMENT & PLAN NOTE
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

## 2024-02-05 NOTE — ASSESSMENT & PLAN NOTE
-managed on isosorbide, carvedilol    -slightly above goal during visit, has not yet had medications and is not cooperative with BP, would not make changes at this time  -CBC, CMP obtained 9/2023 and stable

## 2024-02-05 NOTE — PROGRESS NOTES
tablet Take 1 tablet by mouth daily    aspirin 81 MG chewable tablet Take 1 tablet by mouth daily    ferrous sulfate (IRON 325) 325 (65 Fe) MG tablet Take 1 tablet by mouth every morning (before breakfast)    isosorbide dinitrate (ISORDIL) 20 MG tablet Take 1 tablet by mouth 3 times daily    levETIRAcetam (KEPPRA) 100 MG/ML solution Give 5 ml by mouth two times daily    pantoprazole sodium (PROTONIX) 40 MG PACK packet Take 1 packet by mouth daily     No current facility-administered medications for this visit.         LAB DATA REVIEWED:          Lab Results   Component Value Date/Time    HGB 11.1 (L) 09/21/2023 11:48 AM    HCT 33.7 (L) 09/21/2023 11:48 AM    MCV 95.7 09/21/2023 11:48 AM    MCH 31.5 09/21/2023 11:48 AM    MCHC 32.9 09/21/2023 11:48 AM    RDW 12.7 09/21/2023 11:48 AM     09/21/2023 11:48 AM    WBC 7.9 09/21/2023 11:48 AM     Lab Results   Component Value Date/Time    CREATININE 1.74 (H) 09/21/2023 11:48 AM    BUN 16 09/21/2023 11:48 AM    EGFR 42 (L) 03/24/2023 08:22 AM     (L) 09/21/2023 11:48 AM    K 5.1 09/21/2023 11:48 AM    CALCIUM 9.0 09/21/2023 11:48 AM    CO2 23 09/21/2023 11:48 AM     09/21/2023 11:48 AM    AST 5 (L) 09/21/2023 11:48 AM    ALT 10 (L) 09/21/2023 11:48 AM    ALKPHOS 161 (H) 09/21/2023 11:48 AM    ALKPHOS 151 (H) 03/24/2023 08:22 AM    BILITOT 0.3 09/21/2023 11:48 AM    LABALBU 3.1 (L) 09/21/2023 11:48 AM     Lab Results   Component Value Date/Time    LABA1C 5.4 03/24/2023 08:22 AM    TSH 1.62 11/28/2022 03:27 AM    IRON 42 03/24/2023 08:22 AM    FERRITIN 148 03/24/2023 08:22 AM    TIBC 186 (L) 03/24/2023 08:22 AM                THANH MARS NP

## 2024-02-05 NOTE — ASSESSMENT & PLAN NOTE
-Major stroke 6/27/17 with subsequent L-sided weakness and dysarthria, did not receive TPA due to unknown time of onset  -Per chart review, also had stroke in 2022 during series of hospitalization and rehabilitation events   -Has residual L-sided spastic hemiplegia and dysphagia  -Previously saw neurologist Dr. Ambriz, neurologist in [place] but is no longer able to attend outpatient appointments  -Managed on aspirin 81mg, atorvastatin 40mg

## 2024-02-05 NOTE — ASSESSMENT & PLAN NOTE
-Seen by SLP during hospitalization 11/18/22 for bedside swallow exam significant for moderate oral dysphagia, recommended pureed diet with thin liquids  -Worked with home health SLP, has been upgraded to chopped foods  -Episode of possible aspiration in Oct with subsequent cough, CXR 10/25 negative for pneumonia   -No aspiration events since last visit, eating primarily pureed foods

## 2024-02-05 NOTE — ASSESSMENT & PLAN NOTE
-Last renal panel 9/2023 with Cr 1.74, eGFR 39  -Previously saw nephrologist Dr. Melendez, last visit 2019 with baseline Cr about 1.7  -Avoid nephrotoxic medications and consider renal dosing  -Not able to follow up with nephrology outpatient as he is unable to leave home

## 2024-02-20 ENCOUNTER — CLINICAL DOCUMENTATION (OUTPATIENT)
Facility: CLINIC | Age: 79
End: 2024-02-20

## 2024-02-20 NOTE — PROGRESS NOTES
[facility] - Plan of Care  [address], [address]  [place] [address]    [facility] Team Members: Montserrat Cummings MD; Doreen Doe NP; Abida Sloan NP; Dulce Robin, MAG; Timi Mcgovern, RN; Tri Arriaga, RN; Tatiana Montana LCSW    Nehemias Burleson Jr.  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)    Myoclonic jerking       Advance Care Planning:    Code Status: Full Code        Primary Decision Maker (Active): Lillian Burleson - Spouse - [phone]       2/20/2024    11:32 AM   Demographics   Marital Status        DME/Supplies:  Hospital Bed, Alli Lift, and Wheelchair (non-motorized)     No Known Allergies    Nutritional Requirements:   Oral with supported feeding    Functional/Activity Level:  Bedbound only    Safety Measures:   Aspiration risk, Fall risk, Self-care deficit, and risk for skin breakdown    Acuity Level Rating: High    Current Outpatient Medications   Medication Sig    atorvastatin (LIPITOR) 40 MG tablet TAKE 1 TABLET BY MOUTH EVERY DAY    folic acid (FOLVITE) 1 MG tablet TAKE 1 TABLET BY MOUTH EVERY DAY    carvedilol (COREG) 25 MG tablet TAKE 1 TABLET BY MOUTH TWICE A DAY WITH MEALS    cyanocobalamin 1000 MCG tablet Take 1 tablet by mouth daily    aspirin 81 MG chewable tablet Take 1 tablet by mouth daily    ferrous sulfate (IRON 325) 325 (65 Fe) MG tablet Take 1 tablet by mouth every morning (before breakfast)    isosorbide dinitrate (ISORDIL) 20 MG tablet Take 1 tablet

## 2024-03-13 ENCOUNTER — TELEPHONE (OUTPATIENT)
Facility: CLINIC | Age: 79
End: 2024-03-13

## 2024-03-13 NOTE — TELEPHONE ENCOUNTER
Face problem - Lillian Benjy called to report that the patient has \"trembling in his face on the left\".  She states that the patients face pulls back towards his ear.  Lillian said that the patient doesn't seem to be in any pain, but is drooling more.    Lillian asks for a callback at [phone]

## 2024-03-13 NOTE — TELEPHONE ENCOUNTER
Call discussed with Abida Sloan NP and returned to patient's wife - Mrs. Burleson reports that she has noticed this \"trembling of his face\" on the left side a while ago. It seems to come and go. But now it is happening approximately every other day.  It is worse when he is up, and better if he lays down.  She verbalizes concerns about increased drooling. When asked, she responded that he doesn't seem to have difficulty swallowing because of the drooling. She is concerned that he will become dehydrated because of that. I explained that the amount of saliva produced wouldn't be sufficient to dehydrate the patient, but she could also encourage him to drink more fluid. I explained that this presentation is most likely signs of progression of his vascular dementia. If it becomes worse to the point that affects his swallowing, NP might consider medication to reduce production of saliva, but this medication has side effects.  I encouraged her to observe, and if signs of facial asymmetry, call us back as that would be a more serious issue.  Otherwise, NP would assess him at her next visit coming up in about 2 weeks.  Wife verbalized appreciation for the call.

## 2024-03-14 ENCOUNTER — OFFICE VISIT (OUTPATIENT)
Facility: CLINIC | Age: 79
End: 2024-03-14

## 2024-03-14 ENCOUNTER — TELEPHONE (OUTPATIENT)
Facility: CLINIC | Age: 79
End: 2024-03-14

## 2024-03-14 DIAGNOSIS — Z76.89 ENCOUNTER FOR SOCIAL WORK INTERVENTION: Primary | ICD-10-CM

## 2024-03-14 NOTE — TELEPHONE ENCOUNTER
The patient's insurance would not verify for his in home visit today with Tatiana Montana.  Although her visits are not billable appointments I went to availity and verified the patient's Medicare A & B Insurance as follows:

## 2024-03-15 NOTE — PROGRESS NOTES
[facility]  Social Work Assessment    Patient name: Nehemias Burleson    Date of visit: 3/14/24    Session today completed with: Lillian Mack    Relationship to patient: Spouse    Purpose of visit: Completion of [facility] annual paperwork    Visit narrative: LCSW visited with pt in his home where he resides with spouse and son. Pt was in bed in his back bedroom. He would open his eyes when LCSW greeted him, but is unable to answer simple question or engage with LCSW due to advanced disease progression. LCSW sat with spouse in the living room and went over the [facility] annual paperwork, which she signed and completed. She denied any acute psychosocial concern at this time.    Plan for follow up: LCSW to remain available for support and resources as needed.       JEANNINE Mason, [facility]    [facility]  (O) [phone] (C) [phone]

## 2024-03-18 ENCOUNTER — TELEPHONE (OUTPATIENT)
Facility: CLINIC | Age: 79
End: 2024-03-18

## 2024-03-18 NOTE — TELEPHONE ENCOUNTER
Multiple Problems - Lillian called to update Lucero Sloan NP that the patient is having problems.  His jaw is jerking more, he is staring as if not sure where he is, out of place.  There is some response, not as alert as before.  The patient is still drooling and he has a terrible cough with a rattling in his chest.  Lillian expressed worry/concern.    Lillian asks for a callback at [phone]

## 2024-03-18 NOTE — TELEPHONE ENCOUNTER
Appointment request - Lillian called to follow up on her earlier call this morning.  She states that the patient's left side of his face is jerking and he is still staring. She said that an appointment time any time will work.    Lillian's callback is [phone]

## 2024-03-20 ENCOUNTER — TELEPHONE (OUTPATIENT)
Facility: CLINIC | Age: 79
End: 2024-03-20

## 2024-03-20 NOTE — TELEPHONE ENCOUNTER
Lillian Burleson called to update Lucero Sloan NP that the patient seems more relaxed and is sleeping better.  She states that Juan Alberto has a colonoscopy on 3/26 so she said that they can hold off on seeing the NP until the 4/2/2024 appointment.    Lillian's callback if needed is [phone]

## 2024-03-28 ENCOUNTER — TELEPHONE (OUTPATIENT)
Facility: CLINIC | Age: 79
End: 2024-03-28

## 2024-03-28 NOTE — TELEPHONE ENCOUNTER
Called patient to confirm their in home visit with Abida Sloan on 4/2/2024, arrival between 12-4.  Spoke with wife, they confirmed the appointment and answered the covid screen questions. Does anyone in the household have covid no  Have there been any changes to insurance no or location no

## 2024-04-02 ENCOUNTER — OFFICE VISIT (OUTPATIENT)
Facility: CLINIC | Age: 79
End: 2024-04-02
Payer: MEDICARE

## 2024-04-02 VITALS
HEART RATE: 67 BPM | OXYGEN SATURATION: 93 % | DIASTOLIC BLOOD PRESSURE: 62 MMHG | SYSTOLIC BLOOD PRESSURE: 108 MMHG | TEMPERATURE: 97.9 F | RESPIRATION RATE: 16 BRPM

## 2024-04-02 DIAGNOSIS — R13.11 ORAL PHASE DYSPHAGIA: ICD-10-CM

## 2024-04-02 DIAGNOSIS — I10 PRIMARY HYPERTENSION: Primary | ICD-10-CM

## 2024-04-02 DIAGNOSIS — G25.3 MYOCLONIC JERKING: ICD-10-CM

## 2024-04-02 DIAGNOSIS — I69.359 HEMIPLEGIA OF NONDOMINANT SIDE AS LATE EFFECT OF CEREBROVASCULAR ACCIDENT (CVA) (HCC): ICD-10-CM

## 2024-04-02 DIAGNOSIS — D64.9 NORMOCYTIC ANEMIA: ICD-10-CM

## 2024-04-02 DIAGNOSIS — N18.32 STAGE 3B CHRONIC KIDNEY DISEASE (CKD) (HCC): ICD-10-CM

## 2024-04-02 DIAGNOSIS — Z00.00 MEDICARE ANNUAL WELLNESS VISIT, SUBSEQUENT: ICD-10-CM

## 2024-04-02 DIAGNOSIS — K21.9 GASTROESOPHAGEAL REFLUX DISEASE WITHOUT ESOPHAGITIS: ICD-10-CM

## 2024-04-02 DIAGNOSIS — F03.C0 SEVERE DEMENTIA WITHOUT BEHAVIORAL DISTURBANCE, PSYCHOTIC DISTURBANCE, MOOD DISTURBANCE, OR ANXIETY, UNSPECIFIED DEMENTIA TYPE (HCC): ICD-10-CM

## 2024-04-02 DIAGNOSIS — G40.909 SEIZURE DISORDER (HCC): ICD-10-CM

## 2024-04-02 DIAGNOSIS — E78.2 MIXED HYPERLIPIDEMIA: ICD-10-CM

## 2024-04-02 DIAGNOSIS — Z86.73 HISTORY OF STROKE: ICD-10-CM

## 2024-04-02 PROCEDURE — 99349 HOME/RES VST EST MOD MDM 40: CPT | Performed by: NURSE PRACTITIONER

## 2024-04-02 PROCEDURE — 1123F ACP DISCUSS/DSCN MKR DOCD: CPT | Performed by: NURSE PRACTITIONER

## 2024-04-02 PROCEDURE — 1036F TOBACCO NON-USER: CPT | Performed by: NURSE PRACTITIONER

## 2024-04-02 PROCEDURE — G8421 BMI NOT CALCULATED: HCPCS | Performed by: NURSE PRACTITIONER

## 2024-04-02 PROCEDURE — 3074F SYST BP LT 130 MM HG: CPT | Performed by: NURSE PRACTITIONER

## 2024-04-02 PROCEDURE — 3078F DIAST BP <80 MM HG: CPT | Performed by: NURSE PRACTITIONER

## 2024-04-02 PROCEDURE — G0439 PPPS, SUBSEQ VISIT: HCPCS | Performed by: NURSE PRACTITIONER

## 2024-04-02 ASSESSMENT — PATIENT HEALTH QUESTIONNAIRE - PHQ9
SUM OF ALL RESPONSES TO PHQ QUESTIONS 1-9: 0
SUM OF ALL RESPONSES TO PHQ9 QUESTIONS 1 & 2: 0
SUM OF ALL RESPONSES TO PHQ QUESTIONS 1-9: 0
DEPRESSION UNABLE TO ASSESS: FUNCTIONAL CAPACITY MOTIVATION LIMITS ACCURACY
SUM OF ALL RESPONSES TO PHQ QUESTIONS 1-9: 0
SUM OF ALL RESPONSES TO PHQ QUESTIONS 1-9: 0
1. LITTLE INTEREST OR PLEASURE IN DOING THINGS: NOT AT ALL
2. FEELING DOWN, DEPRESSED OR HOPELESS: NOT AT ALL

## 2024-04-02 NOTE — PROGRESS NOTES
[facility]    [phone]     Date of Current Visit: 4/2/2024        SUMMARY       Patient seen in the home today due to taxing effort to seek primary care services in the community s/t advanced dementia, bedbound status s/t stroke     Patient/Family present for Current Visit:  Patient, wife Lillian, son Juan Alberto  Goals of Care as stated by the patient/family is: Improve current functional status, would want hospitalization if indicated         ASSESSMENT AND PLAN       1. Primary hypertension    2. Gastroesophageal reflux disease without esophagitis    3. Oral phase dysphagia    4. Severe dementia without behavioral disturbance, psychotic disturbance, mood disturbance, or anxiety, unspecified dementia type (HCC)    5. Seizure disorder (HCC)    6. 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 Stage 3b chronic kidney disease (CKD) ([facility])    8. Mixed hyperlipidemia    9. Normocytic anemia    10. Myoclonic jerking    11. History of stroke    12. Medicare annual wellness visit, subsequent       1. Primary hypertension  Assessment & Plan:  -BP at goal   -CBC, CMP obtained 9/2023 and stable  -continue isosorbide, carvedilol    2. Gastroesophageal reflux disease without esophagitis  Assessment & Plan:  -Also with dysphagia and increased oral secretions, high risk for aspiration  -Seated upright for all meals  -continue pantoprazole  3. Oral phase dysphagia  Assessment & Plan:  -Seen by SLP during hospitalization 11/18/22 for bedside swallow exam significant for moderate oral dysphagia, recommended pureed diet with thin liquids  -Worked with home health SLP, has been upgraded to chopped foods    -Episode of possible aspiration in Oct with subsequent cough, CXR 10/25 negative for pneumonia   -No aspiration events since last visit, eating primarily pureed foods  4. Severe dementia without behavioral disturbance, psychotic disturbance, mood disturbance, or 
missing teeth   Eyes:       Conjunctiva/sclera: Conjunctivae normal.       Pupils: Pupils are equal, round, and reactive to light.       Comments: Unable to assess EOM as he will not follow commands     Cardiovascular:       Rate and Rhythm: Normal rate and regular rhythm.       Pulses: Normal pulses.            Dorsalis pedis pulses are 2+ on the right side and 2+  on the left side.       Heart sounds: Normal heart sounds.    Pulmonary:       Effort: Pulmonary effort is normal.       Breath sounds: Normal breath sounds. No wheezing or rhonchi.     Abdominal:       General: Abdomen is flat. Bowel sounds are normal. There is no distension.       Palpations: Abdomen is soft. There is no mass.       Comments: Healed abdominal scar from previous PEG     Musculoskeletal:       Right lower leg: No edema.       Left lower leg: No edema.       Comments: Generalized quadriparesis.  Contracture of left arm and hand.  Some increased rigidity of RUE- resists extension.      Skin:      General: Skin is warm and dry. Skin to sacrum with pink scar tissue, skin intact   Neurological:       Motor: Weakness present.       Comments: Opens eyes to name but does not track.  Does not follow commands.  Difficulty cooperating with exam, often resisting movement           No Known Allergies  Prior to Visit Medications    Medication Sig Taking? Authorizing Provider   atorvastatin (LIPITOR) 40 MG tablet TAKE 1 TABLET BY MOUTH EVERY DAY  Lucero Sloan APRN - NP   folic acid (FOLVITE) 1 MG tablet TAKE 1 TABLET BY MOUTH EVERY DAY  Lucero Sloan APRN - NP   carvedilol (COREG) 25 MG tablet TAKE 1 TABLET BY MOUTH TWICE A DAY WITH MEALS  Lucero Sloan APRN - NP   cyanocobalamin 1000 MCG tablet Take 1 tablet by mouth daily  Lucero Sloan APRN - NP   aspirin 81 MG chewable tablet Take 1 tablet by mouth daily  Automatic Reconciliation, Ar   ferrous sulfate (IRON 325) 325 (65 Fe) MG tablet Take 1 tablet by mouth every morning (before

## 2024-04-02 NOTE — ASSESSMENT & PLAN NOTE
-Major stroke 6/27/17 with subsequent L-sided hemiplegia and dysarthria, did not receive TPA due to unknown time of onset  -Per chart review, also had stroke in 2022 during series of hospitalization and rehabilitation events   -Previously saw neurologist Dr. Ambriz, neurologist in [place] but is no longer able to attend outpatient appointments  -continue aspirin 81mg, atorvastatin 40mg

## 2024-04-02 NOTE — ASSESSMENT & PLAN NOTE
-Also with dysphagia and increased oral secretions, high risk for aspiration  -Seated upright for all meals  -continue pantoprazole

## 2024-04-02 NOTE — ASSESSMENT & PLAN NOTE
-Likely with vascular component given history of multiple strokes  -MRI 11/2022 significant for \"Severe chronic microvascular ischemic changes and severe cerebral atrophy.\"    -Patient says only a few words, does not follow commands, very little purposeful movement  -Previously saw neurologist in [place] but does not have neurologist in [place] and unable to leave home for appointments currently  -Previously managed on donepezil (no longer taking) and memantine (dc due to diarrhea)  -No changes to behavior or apparent decline since last visit

## 2024-04-02 NOTE — PATIENT INSTRUCTIONS
information.      Personalized Preventive Plan for Nehemias Burleson Jr. - 4/2/2024  Medicare offers a range of preventive health benefits. Some of the tests and screenings are paid in full while other may be subject to a deductible, co-insurance, and/or copay.    Some of these benefits include a comprehensive review of your medical history including lifestyle, illnesses that may run in your family, and various assessments and screenings as appropriate.    After reviewing your medical record and screening and assessments performed today your provider may have ordered immunizations, labs, imaging, and/or referrals for you.  A list of these orders (if applicable) as well as your Preventive Care list are included within your After Visit Summary for your review.    Other Preventive Recommendations:    A preventive eye exam performed by an eye specialist is recommended every 1-2 years to screen for glaucoma; cataracts, macular degeneration, and other eye disorders.  A preventive dental visit is recommended every 6 months.  Try to get at least 150 minutes of exercise per week or 10,000 steps per day on a pedometer .  Order or download the FREE \"Exercise & Physical Activity: Your Everyday Guide\" from [employer]. Call 1-627.722.9294 or search [employer] online.  You need 2641-3762 mg of calcium and 7655-6441 IU of vitamin D per day. It is possible to meet your calcium requirement with diet alone, but a vitamin D supplement is usually necessary to meet this goal.  When exposed to the sun, use a sunscreen that protects against both UVA and UVB radiation with an SPF of 30 or greater. Reapply every 2 to 3 hours or after sweating, drying off with a towel, or swimming.  Always wear a seat belt when traveling in a car. Always wear a helmet when riding a bicycle or motorcycle.

## 2024-04-02 NOTE — ASSESSMENT & PLAN NOTE
-Last known seizure activity Nov 28, 2022 with subsequent hospitalization, occurred in setting of UTI and had not had medicatin  -Currently managed on Keppra 500mg BID (liquid)  -Keppra level obtained 8/2023 and WNL    -No longer followed by neurologist as he is unable to leave the home

## 2024-04-02 NOTE — ASSESSMENT & PLAN NOTE
-Movements appear involuntary and do not seem to be causing any pain or distress  -Not on any acetylcholinesterase inhibitors  -most likely s/t neurodegenerative process (dementia and history of stroke) with cortical involvement, worsened by dehydration and muscle fatigue or worsening contractures  -Labs including CBC, CMP, Mg, TSH, Keppra level obtained 8-9/2023 with anemia and kidney function stable, electrolytes and thyroid WNL, no signs of infection or new abnormalities  -Continue to encouraged hydration, ROM exercises  -some facial twitching since last visit which has subsided, continue to monitor

## 2024-04-02 NOTE — ASSESSMENT & PLAN NOTE
-Last CBC in Sept 2023 with Hgb 11.1, normocytic normochromic  -Iron and ferritin WNL March 2023  -Currently taking B12, folic acid, and ferrous sulfate daily  -taking aspirin 81mg daily for secondary stroke prevention and pantoprazole for GI protection  -No recent bleeding events, continue to monitor

## 2024-04-09 ENCOUNTER — CLINICAL DOCUMENTATION (OUTPATIENT)
Facility: CLINIC | Age: 79
End: 2024-04-09

## 2024-04-09 NOTE — PROGRESS NOTES
[facility] - Plan of Care  [address], [address]  [place] [address]    [facility] Team Members: Montserrat Cummings MD; Doreen Doe NP; Abida Sloan NP; Dulce Robin, MAG; Timi Mcgovern, RN; Tri Arriaga, RN; Tatiana Montana LCSW    Nehemias Burleson Jr.  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)    Myoclonic jerking       Advance Care Planning:    Code Status: Full Code        Primary Decision Maker (Active): Lillian Burleson - Spouse - [phone]       4/9/2024     9:36 AM   Demographics   Marital Status        DME/Supplies:  Hospital Bed, Alli Lift, and Wheelchair (non-motorized)     No Known Allergies    Nutritional Requirements:   Oral with supported feeding    Functional/Activity Level:  Bedbound only    Safety Measures:   Aspiration risk, Fall risk, Self-care deficit, and risk for skin breakdown    Acuity Level Rating: High    Current Outpatient Medications   Medication Sig    atorvastatin (LIPITOR) 40 MG tablet TAKE 1 TABLET BY MOUTH EVERY DAY    folic acid (FOLVITE) 1 MG tablet TAKE 1 TABLET BY MOUTH EVERY DAY    carvedilol (COREG) 25 MG tablet TAKE 1 TABLET BY MOUTH TWICE A DAY WITH MEALS    cyanocobalamin 1000 MCG tablet Take 1 tablet by mouth daily    aspirin 81 MG chewable tablet Take 1 tablet by mouth daily    ferrous sulfate (IRON 325) 325 (65 Fe) MG tablet Take 1 tablet by mouth every morning (before breakfast)    isosorbide dinitrate (ISORDIL) 20 MG tablet Take 1 tablet

## 2024-04-22 RX ORDER — CARVEDILOL 25 MG/1
25 TABLET ORAL 2 TIMES DAILY WITH MEALS
Qty: 180 TABLET | Refills: 4 | Status: SHIPPED | OUTPATIENT
Start: 2024-04-22

## 2024-04-30 ENCOUNTER — TELEPHONE (OUTPATIENT)
Facility: CLINIC | Age: 79
End: 2024-04-30

## 2024-05-06 ENCOUNTER — OFFICE VISIT (OUTPATIENT)
Facility: CLINIC | Age: 79
End: 2024-05-06

## 2024-05-06 DIAGNOSIS — Z76.89 ENCOUNTER FOR SOCIAL WORK INTERVENTION: Primary | ICD-10-CM

## 2024-05-07 NOTE — PROGRESS NOTES
[facility]  Social Work Assessment    Patient name: Nehemias Burleson     Date of visit: 5/6/24    Session today completed with: Lillian Burleson    Relationship to patient: Spouse    Purpose of visit: Routine social work assessment    Visit narrative: LCSW visited with pt and spouse in the home where they live together along with their disabled son. Pt was asleep in hospital bed in his bedroom. Spouse attempted to wake him by calling his name and gently nudging his shoulder. He opened his eyes briefly. He appeared comfortable, without distress. He appeared well groomed. Spouse is primary caregiver without assistance, and she is able to maintain his hygiene care, provide prepared meals, and feed him. She denies feeling burdened or overwhelmed. LCSW provided support to spouse today. She denied any acute psychosocial concerns at this time.     Plan for follow up: LCSW to remain available for support and resources as needed.       JEANNINE Mason, [facility]    [facility]  (O) [phone]  (C) [phone]

## 2024-05-16 RX ORDER — FERROUS SULFATE 325(65) MG
1 TABLET ORAL
Qty: 90 TABLET | Refills: 1 | Status: SHIPPED | OUTPATIENT
Start: 2024-05-16

## 2024-05-16 RX ORDER — FOLIC ACID 1 MG/1
1000 TABLET ORAL DAILY
Qty: 90 TABLET | Refills: 1 | Status: SHIPPED | OUTPATIENT
Start: 2024-05-16

## 2024-05-16 RX ORDER — ATORVASTATIN CALCIUM 40 MG/1
40 TABLET, FILM COATED ORAL DAILY
Qty: 90 TABLET | Refills: 1 | Status: SHIPPED | OUTPATIENT
Start: 2024-05-16

## 2024-05-16 RX ORDER — LEVETIRACETAM 100 MG/ML
SOLUTION ORAL
Qty: 473 ML | Refills: 2 | Status: SHIPPED | OUTPATIENT
Start: 2024-05-16

## 2024-05-17 RX ORDER — ISOSORBIDE DINITRATE 20 MG/1
20 TABLET ORAL 3 TIMES DAILY
Qty: 270 TABLET | Refills: 3 | Status: SHIPPED | OUTPATIENT
Start: 2024-05-17

## 2024-06-06 ENCOUNTER — TELEPHONE (OUTPATIENT)
Facility: CLINIC | Age: 79
End: 2024-06-06

## 2024-06-06 NOTE — TELEPHONE ENCOUNTER
Telephone call to wife Lillian, offered visit with Dr. Robin on 6/14 between 10 - 12.  Visit accepted and scheduled.

## 2024-06-12 ENCOUNTER — TELEPHONE (OUTPATIENT)
Facility: CLINIC | Age: 79
End: 2024-06-12

## 2024-06-12 NOTE — TELEPHONE ENCOUNTER
Called patient to confirm their in home visit with Dr. Robin on 6/14/2024, arrival between 9-1.  Spoke with spouse, they confirmed the appointment and answered the covid screen questions. Does anyone in the household have covid no  Have there been any changes to insurance no or location no

## 2024-06-14 ENCOUNTER — OFFICE VISIT (OUTPATIENT)
Facility: CLINIC | Age: 79
End: 2024-06-14
Payer: MEDICARE

## 2024-06-14 DIAGNOSIS — I69.359 HEMIPLEGIA OF NONDOMINANT SIDE AS LATE EFFECT OF CEREBROVASCULAR ACCIDENT (CVA) (HCC): Chronic | ICD-10-CM

## 2024-06-14 DIAGNOSIS — I10 PRIMARY HYPERTENSION: Chronic | ICD-10-CM

## 2024-06-14 DIAGNOSIS — G40.909 SEIZURE DISORDER (HCC): Chronic | ICD-10-CM

## 2024-06-14 DIAGNOSIS — N18.32 STAGE 3B CHRONIC KIDNEY DISEASE (CKD) (HCC): Chronic | ICD-10-CM

## 2024-06-14 DIAGNOSIS — K21.9 GASTROESOPHAGEAL REFLUX DISEASE WITHOUT ESOPHAGITIS: Chronic | ICD-10-CM

## 2024-06-14 DIAGNOSIS — E78.2 MIXED HYPERLIPIDEMIA: Chronic | ICD-10-CM

## 2024-06-14 DIAGNOSIS — F03.C0 SEVERE DEMENTIA WITHOUT BEHAVIORAL DISTURBANCE, PSYCHOTIC DISTURBANCE, MOOD DISTURBANCE, OR ANXIETY, UNSPECIFIED DEMENTIA TYPE (HCC): Chronic | ICD-10-CM

## 2024-06-14 DIAGNOSIS — Z86.73 HISTORY OF STROKE: Primary | Chronic | ICD-10-CM

## 2024-06-14 PROCEDURE — 99349 HOME/RES VST EST MOD MDM 40: CPT | Performed by: FAMILY MEDICINE

## 2024-06-14 PROCEDURE — 3078F DIAST BP <80 MM HG: CPT | Performed by: FAMILY MEDICINE

## 2024-06-14 PROCEDURE — 1123F ACP DISCUSS/DSCN MKR DOCD: CPT | Performed by: FAMILY MEDICINE

## 2024-06-14 PROCEDURE — G8421 BMI NOT CALCULATED: HCPCS | Performed by: FAMILY MEDICINE

## 2024-06-14 PROCEDURE — 1036F TOBACCO NON-USER: CPT | Performed by: FAMILY MEDICINE

## 2024-06-14 PROCEDURE — 3075F SYST BP GE 130 - 139MM HG: CPT | Performed by: FAMILY MEDICINE

## 2024-06-17 VITALS
OXYGEN SATURATION: 96 % | SYSTOLIC BLOOD PRESSURE: 130 MMHG | RESPIRATION RATE: 16 BRPM | HEART RATE: 72 BPM | DIASTOLIC BLOOD PRESSURE: 70 MMHG

## 2024-06-17 PROBLEM — I69.359: Chronic | Status: ACTIVE | Noted: 2023-08-03

## 2024-06-17 PROBLEM — I10 PRIMARY HYPERTENSION: Chronic | Status: ACTIVE | Noted: 2023-03-23

## 2024-06-17 PROBLEM — K21.9 GASTROESOPHAGEAL REFLUX DISEASE WITHOUT ESOPHAGITIS: Chronic | Status: ACTIVE | Noted: 2023-03-23

## 2024-06-17 PROBLEM — F03.C0 SEVERE DEMENTIA WITHOUT BEHAVIORAL DISTURBANCE, PSYCHOTIC DISTURBANCE, MOOD DISTURBANCE, OR ANXIETY (HCC): Chronic | Status: ACTIVE | Noted: 2023-03-23

## 2024-06-17 PROBLEM — G40.909 SEIZURE DISORDER (HCC): Chronic | Status: ACTIVE | Noted: 2022-11-27

## 2024-06-17 PROBLEM — E78.2 MIXED HYPERLIPIDEMIA: Chronic | Status: ACTIVE | Noted: 2023-03-23

## 2024-06-17 PROBLEM — Z86.73 HISTORY OF STROKE: Chronic | Status: ACTIVE | Noted: 2023-03-23

## 2024-06-17 PROBLEM — N18.32 STAGE 3B CHRONIC KIDNEY DISEASE (CKD) (HCC): Chronic | Status: ACTIVE | Noted: 2023-05-03

## 2024-06-17 NOTE — PROGRESS NOTES
BEFORE BREAKFAST 90 tablet 1    folic acid (FOLVITE) 1 MG tablet TAKE 1 TABLET BY MOUTH EVERY DAY 90 tablet 1    carvedilol (COREG) 25 MG tablet TAKE 1 TABLET BY MOUTH TWICE A DAY WITH FOOD 180 tablet 4    cyanocobalamin 1000 MCG tablet Take 1 tablet by mouth daily 90 tablet 1    aspirin 81 MG chewable tablet Take 1 tablet by mouth daily      pantoprazole sodium (PROTONIX) 40 MG PACK packet Take 1 packet by mouth daily       No current facility-administered medications on file prior to visit.       The patient's problem list, medication list, allergies, past medical history, family history and social history have been reviewed and updated during today's visit.      OBJECTIVE:    Vital Signs: /70 (Site: Right Wrist, Position: Supine)   Pulse 72   Resp 16   SpO2 96%       General: Well developed, well nourished male in no acute distress  Skin:  No rashes or suspicious skin lesion noted  HEENT: Normocephalic, PERRL, ears are normal, tongue is normal, oropharynx shows no inflammation  Neck:  Supple, no lymphadenopathy, no thyromegaly, no tenderness  Cardiac: Regular rate and rhythm, no murmurs, rubs or gallops  Chest:  Clear to auscultation, no rales, wheezes or rhonchi, breathing is effortless at rest  Abdomen: Soft, non-tender, non-distended, no masses or organomegaly, normal bowel sounds  Extremities: No clubbing, cyanosis or edema  MS:  No joint tenderness, redness or swelling, full ROM, back non-tender  Neurologic: He is obtunded this morning, but does say a few words to his wife. He is very weak and moves very little.  Psychiatric: Oriented x 0      Desean Robin Jr., MD      Total time spent caring for this patient on the day of the encounter:    Minutes spent before the visit: 17  Minutes spent during the visit: 41  Minutes spent after the visit:  18    Total time spent:   76

## 2024-06-18 ENCOUNTER — TELEPHONE (OUTPATIENT)
Facility: CLINIC | Age: 79
End: 2024-06-18

## 2024-06-18 NOTE — TELEPHONE ENCOUNTER
Per Dr. Robin, I called Lillian Burleson to offer a 4 week in home visit for the patient on Friday, 7/12/2024, arrival between 9am-1pm.  Lillian accepted the appointment offer.

## 2024-07-09 ENCOUNTER — TELEPHONE (OUTPATIENT)
Facility: CLINIC | Age: 79
End: 2024-07-09

## 2024-07-09 NOTE — TELEPHONE ENCOUNTER
Called patient to confirm their in home visit with Dr. Carias on 7/12/2024, arrival between 9-1.  Spoke with wife, they confirmed the appointment and answered the covid screen questions. Does anyone in the household have covid no  Have there been any changes to insurance no or location no

## 2024-07-12 ENCOUNTER — OFFICE VISIT (OUTPATIENT)
Facility: CLINIC | Age: 79
End: 2024-07-12

## 2024-07-12 VITALS — DIASTOLIC BLOOD PRESSURE: 80 MMHG | HEART RATE: 94 BPM | RESPIRATION RATE: 16 BRPM | SYSTOLIC BLOOD PRESSURE: 135 MMHG

## 2024-07-12 DIAGNOSIS — F03.C0 SEVERE DEMENTIA WITHOUT BEHAVIORAL DISTURBANCE, PSYCHOTIC DISTURBANCE, MOOD DISTURBANCE, OR ANXIETY, UNSPECIFIED DEMENTIA TYPE (HCC): Chronic | ICD-10-CM

## 2024-07-12 DIAGNOSIS — G40.909 SEIZURE DISORDER (HCC): Chronic | ICD-10-CM

## 2024-07-12 DIAGNOSIS — I69.359 HEMIPLEGIA OF NONDOMINANT SIDE AS LATE EFFECT OF CEREBROVASCULAR ACCIDENT (CVA) (HCC): Chronic | ICD-10-CM

## 2024-07-12 DIAGNOSIS — E78.2 MIXED HYPERLIPIDEMIA: Chronic | ICD-10-CM

## 2024-07-12 DIAGNOSIS — I10 BENIGN ESSENTIAL HYPERTENSION: ICD-10-CM

## 2024-07-12 DIAGNOSIS — N18.32 STAGE 3B CHRONIC KIDNEY DISEASE (CKD) (HCC): Chronic | ICD-10-CM

## 2024-07-12 DIAGNOSIS — Z86.73 HISTORY OF STROKE: Primary | Chronic | ICD-10-CM

## 2024-07-12 PROBLEM — I63.20 CEREBROVASCULAR ACCIDENT (CVA) DUE TO OCCLUSION OF PRECEREBRAL ARTERY (HCC): Chronic | Status: ACTIVE | Noted: 2024-07-12

## 2024-07-12 NOTE — PROGRESS NOTES
[facility]      PRIMARY CARE AT HOME - HOME VISIT      NAME: Nehemias Burleson Jr.    ADDRESS: [address] [place] [address]    :  1945  MRN:  [id number]        ASSESSMENT:     Diagnosis Orders   1. History of stroke        2. 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 Seizure disorder (HCC)        4. Benign essential hypertension        5. Stage 3b chronic kidney disease (CKD) (HCC)        6. Mixed hyperlipidemia        7. Severe dementia without behavioral disturbance, psychotic disturbance, mood disturbance, or anxiety, unspecified dementia type (HCC)              PLAN:    CVA/ SEIZURE DISORDER: This problem is stable. Will continue close surveillance. Recheck in 1 month.    HYPERTENSION: This problem is stable. Current treatment was continued as noted above.     CKD: This problem is stable. Will continue close surveillance.    HYPERLIPIDEMIA: This problem is stable. Will continue current treatment including diet, exercise and medication.    DEMENTIA: This problem has deteriorated. Will continue close surveillance.      SUBJECTIVE:    Chief Complaint:  Chief Complaint   Patient presents with    Other     FOLLOW UP/ CVA/ HTN       History of Present Illness:    Nehemias Burleson Jr. is a 79 y.o. male who is seen for an [facility] Primary Care At Home Home Visit. It would be physically and emotionally difficult to take the patient from the home to seek primary care services in the community. The patient is homebound as a result of very limited mobility due to multiple medical problems including a CVA. His wife is his primary caretaker.     The history is obtained from the patient, nursing staff, family and previous records and is felt to be satisfactory to establish an acceptable plan of care. Health status indicators were reviewed and there are no changes except as noted above. The past medical history, family history, social history

## 2024-07-19 ENCOUNTER — HOSPITAL ENCOUNTER (OUTPATIENT)
Facility: HOSPITAL | Age: 79
Setting detail: OBSERVATION
LOS: 1 days | Discharge: NURSING, REHAB OR LONG-TERM CARE | End: 2024-07-20
Admitting: INTERNAL MEDICINE
Payer: MEDICARE

## 2024-07-19 ENCOUNTER — APPOINTMENT (OUTPATIENT)
Facility: HOSPITAL | Age: 79
End: 2024-07-19
Payer: MEDICARE

## 2024-07-19 ENCOUNTER — TELEPHONE (OUTPATIENT)
Facility: CLINIC | Age: 79
End: 2024-07-19

## 2024-07-19 DIAGNOSIS — R00.1 BRADYCARDIA: Primary | ICD-10-CM

## 2024-07-19 DIAGNOSIS — I44.1 SECOND DEGREE HEART BLOCK: ICD-10-CM

## 2024-07-19 LAB
ALBUMIN SERPL-MCNC: 3 G/DL (ref 3.5–5)
ALBUMIN/GLOB SERPL: 0.8 (ref 1.1–2.2)
ALP SERPL-CCNC: 144 U/L (ref 45–117)
ALT SERPL-CCNC: 11 U/L (ref 12–78)
ANION GAP BLD CALC-SCNC: 8 (ref 10–20)
ANION GAP SERPL CALC-SCNC: 4 MMOL/L (ref 5–15)
AST SERPL-CCNC: 6 U/L (ref 15–37)
BASE DEFICIT BLD-SCNC: 3.8 MMOL/L
BASOPHILS # BLD: 0.1 K/UL (ref 0–0.1)
BASOPHILS NFR BLD: 2 % (ref 0–1)
BILIRUB SERPL-MCNC: 0.3 MG/DL (ref 0.2–1)
BUN SERPL-MCNC: 23 MG/DL (ref 6–20)
BUN/CREAT SERPL: 13 (ref 12–20)
CA-I BLD-MCNC: 1.33 MMOL/L (ref 1.12–1.32)
CALCIUM SERPL-MCNC: 8.3 MG/DL (ref 8.5–10.1)
CHLORIDE BLD-SCNC: 106 MMOL/L (ref 100–108)
CHLORIDE SERPL-SCNC: 109 MMOL/L (ref 97–108)
CO2 BLD-SCNC: 26 MMOL/L (ref 19–24)
CO2 SERPL-SCNC: 25 MMOL/L (ref 21–32)
COMMENT:: NORMAL
CREAT SERPL-MCNC: 1.84 MG/DL (ref 0.7–1.3)
CREAT UR-MCNC: 1.7 MG/DL (ref 0.6–1.3)
DIFFERENTIAL METHOD BLD: ABNORMAL
EKG ATRIAL RATE: 54 BPM
EKG DIAGNOSIS: ABNORMAL
EKG P AXIS: 20 DEGREES
EKG P-R INTERVAL: 204 MS
EKG Q-T INTERVAL: 414 MS
EKG QRS DURATION: 66 MS
EKG QTC CALCULATION (BAZETT): 392 MS
EKG R AXIS: 18 DEGREES
EKG T AXIS: 33 DEGREES
EKG VENTRICULAR RATE: 54 BPM
EOSINOPHIL # BLD: 0.5 K/UL (ref 0–0.4)
EOSINOPHIL NFR BLD: 9 % (ref 0–7)
ERYTHROCYTE [DISTWIDTH] IN BLOOD BY AUTOMATED COUNT: 12.6 % (ref 11.5–14.5)
GLOBULIN SER CALC-MCNC: 4 G/DL (ref 2–4)
GLUCOSE BLD STRIP.AUTO-MCNC: 107 MG/DL (ref 74–99)
GLUCOSE SERPL-MCNC: 104 MG/DL (ref 65–100)
HCO3 BLDA-SCNC: 25 MMOL/L
HCT VFR BLD AUTO: 33 % (ref 36.6–50.3)
HGB BLD-MCNC: 10.4 G/DL (ref 12.1–17)
IMM GRANULOCYTES # BLD AUTO: 0 K/UL (ref 0–0.04)
IMM GRANULOCYTES NFR BLD AUTO: 0 % (ref 0–0.5)
LACTATE BLD-SCNC: 0.84 MMOL/L (ref 0.4–2)
LACTATE BLD-SCNC: 2.44 MMOL/L (ref 0.4–2)
LYMPHOCYTES # BLD: 1.7 K/UL (ref 0.8–3.5)
LYMPHOCYTES NFR BLD: 34 % (ref 12–49)
MAGNESIUM SERPL-MCNC: 2 MG/DL (ref 1.6–2.4)
MCH RBC QN AUTO: 32.1 PG (ref 26–34)
MCHC RBC AUTO-ENTMCNC: 31.5 G/DL (ref 30–36.5)
MCV RBC AUTO: 101.9 FL (ref 80–99)
MONOCYTES # BLD: 0.5 K/UL (ref 0–1)
MONOCYTES NFR BLD: 9 % (ref 5–13)
NEUTS SEG # BLD: 2.4 K/UL (ref 1.8–8)
NEUTS SEG NFR BLD: 46 % (ref 32–75)
NRBC # BLD: 0 K/UL (ref 0–0.01)
NRBC BLD-RTO: 0 PER 100 WBC
PCO2 BLDV: 66.3 MMHG (ref 41–51)
PH BLDV: 7.19 (ref 7.32–7.42)
PLATELET # BLD AUTO: 201 K/UL (ref 150–400)
PMV BLD AUTO: 11.5 FL (ref 8.9–12.9)
PO2 BLDV: <27 MMHG (ref 25–40)
POTASSIUM BLD-SCNC: 4.5 MMOL/L (ref 3.5–5.5)
POTASSIUM SERPL-SCNC: 4.6 MMOL/L (ref 3.5–5.1)
PROT SERPL-MCNC: 7 G/DL (ref 6.4–8.2)
RBC # BLD AUTO: 3.24 M/UL (ref 4.1–5.7)
SERVICE CMNT-IMP: ABNORMAL
SODIUM BLD-SCNC: 140 MMOL/L (ref 136–145)
SODIUM SERPL-SCNC: 138 MMOL/L (ref 136–145)
SPECIMEN HOLD: NORMAL
SPECIMEN SITE: ABNORMAL
TROPONIN I SERPL HS-MCNC: 8 NG/L (ref 0–76)
TSH SERPL DL<=0.05 MIU/L-ACNC: 4.2 UIU/ML (ref 0.36–3.74)
WBC # BLD AUTO: 5.1 K/UL (ref 4.1–11.1)

## 2024-07-19 PROCEDURE — 96360 HYDRATION IV INFUSION INIT: CPT

## 2024-07-19 PROCEDURE — 6370000000 HC RX 637 (ALT 250 FOR IP): Performed by: INTERNAL MEDICINE

## 2024-07-19 PROCEDURE — 84443 ASSAY THYROID STIM HORMONE: CPT

## 2024-07-19 PROCEDURE — 2580000003 HC RX 258

## 2024-07-19 PROCEDURE — 71045 X-RAY EXAM CHEST 1 VIEW: CPT

## 2024-07-19 PROCEDURE — 84132 ASSAY OF SERUM POTASSIUM: CPT

## 2024-07-19 PROCEDURE — 96361 HYDRATE IV INFUSION ADD-ON: CPT

## 2024-07-19 PROCEDURE — 85025 COMPLETE CBC W/AUTO DIFF WBC: CPT

## 2024-07-19 PROCEDURE — 93005 ELECTROCARDIOGRAM TRACING: CPT

## 2024-07-19 PROCEDURE — 82803 BLOOD GASES ANY COMBINATION: CPT

## 2024-07-19 PROCEDURE — 6360000002 HC RX W HCPCS

## 2024-07-19 PROCEDURE — 6360000002 HC RX W HCPCS: Performed by: INTERNAL MEDICINE

## 2024-07-19 PROCEDURE — 84295 ASSAY OF SERUM SODIUM: CPT

## 2024-07-19 PROCEDURE — 83605 ASSAY OF LACTIC ACID: CPT

## 2024-07-19 PROCEDURE — 80053 COMPREHEN METABOLIC PANEL: CPT

## 2024-07-19 PROCEDURE — 82947 ASSAY GLUCOSE BLOOD QUANT: CPT

## 2024-07-19 PROCEDURE — 36415 COLL VENOUS BLD VENIPUNCTURE: CPT

## 2024-07-19 PROCEDURE — 83735 ASSAY OF MAGNESIUM: CPT

## 2024-07-19 PROCEDURE — 84484 ASSAY OF TROPONIN QUANT: CPT

## 2024-07-19 PROCEDURE — 2580000003 HC RX 258: Performed by: INTERNAL MEDICINE

## 2024-07-19 PROCEDURE — 2060000000 HC ICU INTERMEDIATE R&B

## 2024-07-19 PROCEDURE — 99285 EMERGENCY DEPT VISIT HI MDM: CPT

## 2024-07-19 PROCEDURE — 82330 ASSAY OF CALCIUM: CPT

## 2024-07-19 RX ORDER — ACETAMINOPHEN 325 MG/1
650 TABLET ORAL EVERY 6 HOURS PRN
Status: DISCONTINUED | OUTPATIENT
Start: 2024-07-19 | End: 2024-07-20 | Stop reason: HOSPADM

## 2024-07-19 RX ORDER — ENOXAPARIN SODIUM 100 MG/ML
40 INJECTION SUBCUTANEOUS DAILY
Status: DISCONTINUED | OUTPATIENT
Start: 2024-07-20 | End: 2024-07-20 | Stop reason: HOSPADM

## 2024-07-19 RX ORDER — SODIUM CHLORIDE, SODIUM LACTATE, POTASSIUM CHLORIDE, AND CALCIUM CHLORIDE .6; .31; .03; .02 G/100ML; G/100ML; G/100ML; G/100ML
500 INJECTION, SOLUTION INTRAVENOUS
Status: COMPLETED | OUTPATIENT
Start: 2024-07-19 | End: 2024-07-19

## 2024-07-19 RX ORDER — ONDANSETRON 4 MG/1
4 TABLET, ORALLY DISINTEGRATING ORAL EVERY 8 HOURS PRN
Status: DISCONTINUED | OUTPATIENT
Start: 2024-07-19 | End: 2024-07-20 | Stop reason: HOSPADM

## 2024-07-19 RX ORDER — ATROPINE SULFATE 0.1 MG/ML
INJECTION INTRAVENOUS
Status: COMPLETED
Start: 2024-07-19 | End: 2024-07-19

## 2024-07-19 RX ORDER — FOLIC ACID 1 MG/1
1000 TABLET ORAL DAILY
Status: DISCONTINUED | OUTPATIENT
Start: 2024-07-20 | End: 2024-07-20 | Stop reason: HOSPADM

## 2024-07-19 RX ORDER — HYDRALAZINE HYDROCHLORIDE 20 MG/ML
20 INJECTION INTRAMUSCULAR; INTRAVENOUS EVERY 6 HOURS PRN
Status: DISCONTINUED | OUTPATIENT
Start: 2024-07-19 | End: 2024-07-19

## 2024-07-19 RX ORDER — ASPIRIN 81 MG/1
81 TABLET, CHEWABLE ORAL DAILY
Status: DISCONTINUED | OUTPATIENT
Start: 2024-07-20 | End: 2024-07-20 | Stop reason: HOSPADM

## 2024-07-19 RX ORDER — LEVETIRACETAM 500 MG/5ML
500 INJECTION, SOLUTION, CONCENTRATE INTRAVENOUS EVERY 12 HOURS
Status: DISCONTINUED | OUTPATIENT
Start: 2024-07-19 | End: 2024-07-20 | Stop reason: HOSPADM

## 2024-07-19 RX ORDER — UREA 10 %
1000 LOTION (ML) TOPICAL DAILY
Status: DISCONTINUED | OUTPATIENT
Start: 2024-07-20 | End: 2024-07-20 | Stop reason: HOSPADM

## 2024-07-19 RX ORDER — SODIUM CHLORIDE 0.9 % (FLUSH) 0.9 %
5-40 SYRINGE (ML) INJECTION PRN
Status: DISCONTINUED | OUTPATIENT
Start: 2024-07-19 | End: 2024-07-20 | Stop reason: HOSPADM

## 2024-07-19 RX ORDER — SODIUM CHLORIDE 0.9 % (FLUSH) 0.9 %
5-40 SYRINGE (ML) INJECTION EVERY 12 HOURS SCHEDULED
Status: DISCONTINUED | OUTPATIENT
Start: 2024-07-19 | End: 2024-07-20 | Stop reason: HOSPADM

## 2024-07-19 RX ORDER — ACETAMINOPHEN 650 MG/1
650 SUPPOSITORY RECTAL EVERY 6 HOURS PRN
Status: DISCONTINUED | OUTPATIENT
Start: 2024-07-19 | End: 2024-07-20 | Stop reason: HOSPADM

## 2024-07-19 RX ORDER — BISACODYL 10 MG
10 SUPPOSITORY, RECTAL RECTAL DAILY PRN
Status: DISCONTINUED | OUTPATIENT
Start: 2024-07-19 | End: 2024-07-20 | Stop reason: HOSPADM

## 2024-07-19 RX ORDER — SODIUM CHLORIDE 9 MG/ML
INJECTION, SOLUTION INTRAVENOUS PRN
Status: DISCONTINUED | OUTPATIENT
Start: 2024-07-19 | End: 2024-07-20 | Stop reason: HOSPADM

## 2024-07-19 RX ORDER — HYDRALAZINE HYDROCHLORIDE 20 MG/ML
20 INJECTION INTRAMUSCULAR; INTRAVENOUS EVERY 6 HOURS PRN
Status: DISCONTINUED | OUTPATIENT
Start: 2024-07-19 | End: 2024-07-20 | Stop reason: HOSPADM

## 2024-07-19 RX ORDER — ACETAMINOPHEN 325 MG/1
650 TABLET ORAL EVERY 4 HOURS PRN
Status: DISCONTINUED | OUTPATIENT
Start: 2024-07-19 | End: 2024-07-20 | Stop reason: SDUPTHER

## 2024-07-19 RX ORDER — SODIUM CHLORIDE 9 MG/ML
INJECTION, SOLUTION INTRAVENOUS CONTINUOUS
Status: DISCONTINUED | OUTPATIENT
Start: 2024-07-19 | End: 2024-07-20 | Stop reason: HOSPADM

## 2024-07-19 RX ORDER — ONDANSETRON 2 MG/ML
4 INJECTION INTRAMUSCULAR; INTRAVENOUS EVERY 6 HOURS PRN
Status: DISCONTINUED | OUTPATIENT
Start: 2024-07-19 | End: 2024-07-20 | Stop reason: HOSPADM

## 2024-07-19 RX ORDER — ISOSORBIDE DINITRATE 10 MG/1
20 TABLET ORAL 3 TIMES DAILY
Status: DISCONTINUED | OUTPATIENT
Start: 2024-07-19 | End: 2024-07-20 | Stop reason: HOSPADM

## 2024-07-19 RX ORDER — ATORVASTATIN CALCIUM 40 MG/1
40 TABLET, FILM COATED ORAL DAILY
Status: DISCONTINUED | OUTPATIENT
Start: 2024-07-20 | End: 2024-07-20 | Stop reason: HOSPADM

## 2024-07-19 RX ADMIN — ISOSORBIDE DINITRATE 20 MG: 10 TABLET ORAL at 21:41

## 2024-07-19 RX ADMIN — LEVETIRACETAM 500 MG: 500 INJECTION, SOLUTION, CONCENTRATE INTRAVENOUS at 21:41

## 2024-07-19 RX ADMIN — SODIUM CHLORIDE, POTASSIUM CHLORIDE, SODIUM LACTATE AND CALCIUM CHLORIDE 500 ML: 600; 310; 30; 20 INJECTION, SOLUTION INTRAVENOUS at 15:17

## 2024-07-19 RX ADMIN — SODIUM CHLORIDE, PRESERVATIVE FREE 10 ML: 5 INJECTION INTRAVENOUS at 21:41

## 2024-07-19 RX ADMIN — ATROPINE SULFATE 1 MG: 0.1 INJECTION INTRAVENOUS at 15:21

## 2024-07-19 RX ADMIN — SODIUM CHLORIDE: 9 INJECTION, SOLUTION INTRAVENOUS at 21:40

## 2024-07-19 NOTE — CONSULTS
for an episode of unresponsiveness.  Patient's sinus bradycardia, hypotension and Wenckebach has resolved at the time of my visit.  He is status post 500 mL bolus of lactated Ringer's.  Status post 1 dose of atropine.  Continue to monitor patient's heart rate overnight on telemetry.  Will attempt to assess for any associated symptoms.  So far, no clear indication of permanent pacemaker implantation.    Thank you for allowing us to participate in the care of this patient.  We will follow.      Umu Guerrero MD  CC:Desean Robin MD

## 2024-07-19 NOTE — ED PROVIDER NOTES
MRM 2 CARDIAC MEDICAL STEP DOWN  EMERGENCY DEPARTMENT ENCOUNTER    Patient Name: Nehemias Burleson Jr.  MRN: [id number]  YOB: 1945  Provider: Jagdish Hwang MD  PCP: Desean Robin MD    Time/Date of evaluation: 5:56 PM EDT on 7/19/24    History of Presenting Illness     Chief Complaint   Patient presents with    Loss of Consciousness     Patient with episode today where he became unresponsive.  CPR was initiated by family.  EMS notes patient HR to be between 20-50.  Patient has a history of dementia.      History Provided by: EMS   History is limited by: Dementia and Mental Status Change    HISTORY (Narrative):   Nheemias Burleson Jr. is a 79 y.o. male with significant dementia presenting to the ER via EMS.  EMS itself on their arrival patient was unresponsive possibly with no heart rate, CPR initiated where patient became more responsive found to have a heart rate in the 20s to 30s and route.  No medications given.  Upon arrival patient's heart rate variable between 30s and 50s.  HPI limited secondary to patient's mental status and dementia.      Nursing Notes were all reviewed and agreed with or any disagreements were addressed in the HPI.    Past History     PAST MEDICAL HISTORY:  Past Medical History:   Diagnosis Date    Cholesterolosis     Chronic kidney disease     GERD (gastroesophageal reflux disease)     Hypercholesterolemia     Hypertension     Kidney disease     Seizure (HCC)     Stroke (HCC)        PAST SURGICAL HISTORY:  History reviewed. No pertinent surgical history.    FAMILY HISTORY:  History reviewed. No pertinent family history.    SOCIAL HISTORY:  Social History     Tobacco Use    Smoking status: Never   Substance Use Topics    Alcohol use: Not Currently       MEDICATIONS:  No current facility-administered medications on file prior to encounter.     Current Outpatient Medications on File Prior to Encounter   Medication Sig Dispense Refill    isosorbide dinitrate (ISORDIL) 20 MG tablet Take 1  tablet by mouth 3 times daily 270 tablet 3    atorvastatin (LIPITOR) 40 MG tablet TAKE 1 TABLET BY MOUTH EVERY DAY 90 tablet 1    levETIRAcetam (KEPPRA) 100 MG/ML oral solution GIVE 5 ML BY MOUTH TWO TIMES DAILY 473 mL 2    ferrous sulfate (IRON 325) 325 (65 Fe) MG tablet TAKE 1 TABLET BY MOUTH EVERY DAY BEFORE BREAKFAST 90 tablet 1    folic acid (FOLVITE) 1 MG tablet TAKE 1 TABLET BY MOUTH EVERY DAY 90 tablet 1    cyanocobalamin 1000 MCG tablet Take 1 tablet by mouth daily 90 tablet 1    aspirin 81 MG chewable tablet Take 1 tablet by mouth daily      pantoprazole sodium (PROTONIX) 40 MG PACK packet Take 1 packet by mouth daily         ALLERGIES:  No Known Allergies    SOCIAL DETERMINANTS OF HEALTH:  Social Determinants of Health     Tobacco Use: Unknown (7/19/2024)    Patient History     Smoking Tobacco Use: Never     Smokeless Tobacco Use: Unknown     Passive Exposure: Not on file   Alcohol Use: Not on file   Financial Resource Strain: Low Risk  (12/12/2023)    Overall Financial Resource Strain (CARDIA)     Difficulty of Paying Living Expenses: Not hard at all   Food Insecurity: No Food Insecurity (12/12/2023)    Hunger Vital Sign     Worried About Running Out of Food in the Last Year: Never true     Ran Out of Food in the Last Year: Never true   Transportation Needs: No Transportation Needs (12/12/2023)    PRAPARE - Transportation     Lack of Transportation (Medical): No     Lack of Transportation (Non-Medical): No   Physical Activity: Inactive (12/12/2023)    Exercise Vital Sign     Days of Exercise per Week: 0 days     Minutes of Exercise per Session: 0 min   Stress: Not on file   Social Connections: Feeling Socially Integrated (8/31/2023)    OASIS : Social Isolation     Frequency of experiencing loneliness or isolation: Never   Intimate Partner Violence: Not on file   Depression: Not at risk (4/2/2024)    PHQ-2     PHQ-2 Score: 0   Housing Stability: Low Risk  (12/12/2023)    Housing Stability Vital Sign

## 2024-07-19 NOTE — ED NOTES
PT. Presents to ED today after an episode where he became unresponsive this afternoon.  Family started CPR on patient.  Upon arrival to ED patient alert however is not oriented.  Patient with dementia at baseline.  Patient placed on bed alarm and on moitor x3.  Patient also with cardiac pads on.

## 2024-07-19 NOTE — H&P
Hospitalist Admission Note    NAME:   Nehemias Burleson Jr.   : 1945   MRN: [id number]     Date/Time: 2024 5:47 PM    Patient PCP: Desean Robin MD    ______________________________________________________________________  Given the patient's current clinical presentation, I have a high level of concern for decompensation if discharged from the emergency department.  Complex decision making was performed, which includes reviewing the patient's available past medical records, laboratory results, and x-ray films.       My assessment of this patient's clinical condition and my plan of care is as follows.    Assessment / Plan:    Syncope POA  Symptomatic bradycardia POA  Type 1 second degree heart block in ED resolved POA  Lactic acidosis POA lactate 2.44, improved with IVF  Wife denies recent illnesses   No fevers, new cough, N/V, diarrhea  Sitting up in chair, suddenly went out for 1-2 minutes   Very sweaty   No shaking or seizure activity  Wife concerned he choked on something, vigorously patted him on back  Called EMS, told her to put him on the floor   By time he was on floor, he was resisting them moving him   Started to wake up  Wife denies doing chest compressions    Advanced dementia POA  Contracted extremities bilaterally POA  Baseline not able to walk   Mainly transfers to wheelchair  Says a few works, but not conversant  Uses a pureed diet, will continue  Speech to see    Prior stroke POA  Hyperlipidemia POA  Essential HTN POA  Continue ASA and statin  Hold coreg with the bradycardia for now   ? Stop altogether or reduce dose  Continue isosorbide TID  ? PRN hydralazine    Seizure disorder POA  Baseline on keppra  No seizure activity noted by wife  Continue keppra    Stage IV chronic kidney disease POA  Baseline creatinine 1.6-1.8, seems at baseline  Gentle IV fluids overnight    Overweight POA BMI 25.5    Code Status: full code per wife, she is NOK  DVT Prophylaxis: lovenox  Baseline:     Medical  Range    Specimen HOld [id number]     Comment:        Add-on orders for these samples will be processed based on acceptable specimen integrity and analyte stability, which may vary by analyte.   TSH    Collection Time: 07/19/24  3:22 PM   Result Value Ref Range    TSH, 3rd Generation 4.20 (H) 0.36 - 3.74 uIU/mL         No results found.   _______________________________________________________________________    TOTAL TIME:  76 Minutes    Critical Care Provided     Minutes non procedure based    Signed: Nehemias To Jr, MD    Procedures: see electronic medical records for all procedures/Xrays and details which were not copied into this note but were reviewed prior to creation of Plan.

## 2024-07-19 NOTE — ED NOTES
PT. Put in gown at this time.  Patient also changed at this time.  Patient with large formed stool.  Pt. With healing pressure wound to sacrum with excoriation.  Nurse Claudine made aware.

## 2024-07-19 NOTE — ED NOTES
Patient provided with PO per Dr. Hwang.  Patient wife requesting pudding as patient eats puree at home. Pt. Tolerating well.

## 2024-07-20 ENCOUNTER — HOME HEALTH ADMISSION (OUTPATIENT)
Dept: HOME HEALTH SERVICES | Facility: HOME HEALTH | Age: 79
End: 2024-07-20
Payer: MEDICARE

## 2024-07-20 VITALS
OXYGEN SATURATION: 94 % | DIASTOLIC BLOOD PRESSURE: 64 MMHG | SYSTOLIC BLOOD PRESSURE: 108 MMHG | HEART RATE: 68 BPM | BODY MASS INDEX: 19.68 KG/M2 | WEIGHT: 125.66 LBS | TEMPERATURE: 97.7 F | RESPIRATION RATE: 16 BRPM

## 2024-07-20 LAB
ALBUMIN SERPL-MCNC: 2.8 G/DL (ref 3.5–5)
ALBUMIN/GLOB SERPL: 0.7 (ref 1.1–2.2)
ALP SERPL-CCNC: 132 U/L (ref 45–117)
ALT SERPL-CCNC: 10 U/L (ref 12–78)
ANION GAP SERPL CALC-SCNC: 6 MMOL/L (ref 5–15)
AST SERPL-CCNC: 5 U/L (ref 15–37)
BASOPHILS # BLD: 0.1 K/UL (ref 0–0.1)
BASOPHILS NFR BLD: 1 % (ref 0–1)
BILIRUB SERPL-MCNC: 0.3 MG/DL (ref 0.2–1)
BUN SERPL-MCNC: 18 MG/DL (ref 6–20)
BUN/CREAT SERPL: 11 (ref 12–20)
CALCIUM SERPL-MCNC: 8.7 MG/DL (ref 8.5–10.1)
CHLORIDE SERPL-SCNC: 109 MMOL/L (ref 97–108)
CO2 SERPL-SCNC: 25 MMOL/L (ref 21–32)
CREAT SERPL-MCNC: 1.59 MG/DL (ref 0.7–1.3)
DIFFERENTIAL METHOD BLD: ABNORMAL
EOSINOPHIL # BLD: 0.4 K/UL (ref 0–0.4)
EOSINOPHIL NFR BLD: 6 % (ref 0–7)
ERYTHROCYTE [DISTWIDTH] IN BLOOD BY AUTOMATED COUNT: 12.5 % (ref 11.5–14.5)
GLOBULIN SER CALC-MCNC: 4.1 G/DL (ref 2–4)
GLUCOSE SERPL-MCNC: 128 MG/DL (ref 65–100)
HCT VFR BLD AUTO: 32.3 % (ref 36.6–50.3)
HGB BLD-MCNC: 10.4 G/DL (ref 12.1–17)
IMM GRANULOCYTES # BLD AUTO: 0 K/UL (ref 0–0.04)
IMM GRANULOCYTES NFR BLD AUTO: 0 % (ref 0–0.5)
LYMPHOCYTES # BLD: 1.9 K/UL (ref 0.8–3.5)
LYMPHOCYTES NFR BLD: 26 % (ref 12–49)
MCH RBC QN AUTO: 31.7 PG (ref 26–34)
MCHC RBC AUTO-ENTMCNC: 32.2 G/DL (ref 30–36.5)
MCV RBC AUTO: 98.5 FL (ref 80–99)
MONOCYTES # BLD: 0.4 K/UL (ref 0–1)
MONOCYTES NFR BLD: 5 % (ref 5–13)
NEUTS SEG # BLD: 4.5 K/UL (ref 1.8–8)
NEUTS SEG NFR BLD: 62 % (ref 32–75)
NRBC # BLD: 0 K/UL (ref 0–0.01)
NRBC BLD-RTO: 0 PER 100 WBC
PLATELET # BLD AUTO: 199 K/UL (ref 150–400)
PMV BLD AUTO: 11.5 FL (ref 8.9–12.9)
POTASSIUM SERPL-SCNC: 4.4 MMOL/L (ref 3.5–5.1)
PROT SERPL-MCNC: 6.9 G/DL (ref 6.4–8.2)
RBC # BLD AUTO: 3.28 M/UL (ref 4.1–5.7)
SODIUM SERPL-SCNC: 140 MMOL/L (ref 136–145)
TROPONIN I SERPL HS-MCNC: 13 NG/L (ref 0–76)
WBC # BLD AUTO: 7.3 K/UL (ref 4.1–11.1)

## 2024-07-20 PROCEDURE — 85025 COMPLETE CBC W/AUTO DIFF WBC: CPT

## 2024-07-20 PROCEDURE — 36415 COLL VENOUS BLD VENIPUNCTURE: CPT

## 2024-07-20 PROCEDURE — 76937 US GUIDE VASCULAR ACCESS: CPT

## 2024-07-20 PROCEDURE — 6360000002 HC RX W HCPCS: Performed by: INTERNAL MEDICINE

## 2024-07-20 PROCEDURE — 6370000000 HC RX 637 (ALT 250 FOR IP): Performed by: INTERNAL MEDICINE

## 2024-07-20 PROCEDURE — 2580000003 HC RX 258: Performed by: INTERNAL MEDICINE

## 2024-07-20 PROCEDURE — G0378 HOSPITAL OBSERVATION PER HR: HCPCS

## 2024-07-20 PROCEDURE — 80053 COMPREHEN METABOLIC PANEL: CPT

## 2024-07-20 PROCEDURE — 92610 EVALUATE SWALLOWING FUNCTION: CPT

## 2024-07-20 PROCEDURE — 84484 ASSAY OF TROPONIN QUANT: CPT

## 2024-07-20 RX ADMIN — FOLIC ACID 1000 MCG: 1 TABLET ORAL at 09:44

## 2024-07-20 RX ADMIN — SODIUM CHLORIDE, PRESERVATIVE FREE 10 ML: 5 INJECTION INTRAVENOUS at 10:40

## 2024-07-20 RX ADMIN — ATORVASTATIN CALCIUM 40 MG: 40 TABLET, FILM COATED ORAL at 09:44

## 2024-07-20 RX ADMIN — ASPIRIN 81 MG: 81 TABLET, CHEWABLE ORAL at 09:44

## 2024-07-20 RX ADMIN — LEVETIRACETAM 500 MG: 500 INJECTION, SOLUTION, CONCENTRATE INTRAVENOUS at 10:39

## 2024-07-20 RX ADMIN — ENOXAPARIN SODIUM 40 MG: 100 INJECTION SUBCUTANEOUS at 09:45

## 2024-07-20 RX ADMIN — ISOSORBIDE DINITRATE 20 MG: 10 TABLET ORAL at 09:44

## 2024-07-20 RX ADMIN — CYANOCOBALAMIN TAB 500 MCG 1000 MCG: 500 TAB at 09:43

## 2024-07-20 NOTE — DISCHARGE SUMMARY
Discharge Summary    Name: Nehemias Burleson Jr.  [id number]  YOB: 1945 (Age: 79 y.o.)   Date of Admission: 7/19/2024  Date of Discharge: 7/20/2024  Attending Physician: Jessica Roman MD    Discharge Diagnosis:     Syncope POA  Symptomatic bradycardia POA  Type 1 second degree heart block in ED resolved POA  Lactic acidosis POA lactate 2.44, improved with IVF  Advanced dementia POA  Contracted extremities bilaterally POA  Prior stroke POA  Hyperlipidemia POA  Essential HTN POA  Continue ASA and statin  Seizure disorder POA  Stage IV chronic kidney disease POA     Overweight POA BMI 25.5    Consultations:  IP CONSULT TO CARDIOLOGY      Brief Admission History/Reason for Admission Per Nehemias To Jr., MD:     Nehemias Burleson is a 79 y.o.  male      Baseline lives with his wife and son     Wife is next of kin and she wants him to be a full code     Advanced dementia  Able to say a few words but not able to put together a sentence or hold a conversation  Needs help for his ADLs and feeding  Able to transfer to wheelchair but does not walk     Prior history of stroke and seizure disorder  Essential hypertension on isosorbide and carvedilol  Currently on aspirin and statin  Takes Keppra for seizure prophylaxis     Wife denies recent illnesses  No increased cough, fevers, nausea vomiting, diarrhea     Today he was sitting up in his wheelchair, wife said he looked uncomfortable  She went over to pull him up in his chair  She noted he was very sweaty, son says it was \"hot in the house\"  Wife said that he then went out for approximately 1 to 2 minutes  No shaking episodes or seizure activity  She initially thought he choked on something, vigorously patted him on the back  When he did NOT wake up EMS was called  911 told her to lay him on the ground and she and the son were able to put them there              She never performed chest compressions              By the time he was

## 2024-07-20 NOTE — PROGRESS NOTES
0700: Bedside and Verbal shift change report given to Alonso Osborne RN   (oncoming nurse) by MAG Hill (offgoing nurse). Report included the following information Nurse Handoff Report, Index, Adult Overview, Intake/Output, MAR, Recent Results, and Cardiac Rhythm A-Fib, NSR .     0930: Patient wife at bedside. Answered all questions. Patient wife reports she is supposed to have a meeting with the MD today.    0935: MD messaged via perfect serve regarding meeting with wife.     0940: Speech at bedside.    1000: Patient cleaned from having a BM.    1030: PICC team at bedside to place PIV line and draw labs. MD at bedside talking with wife.    1220:MD messaged via perfect serve regarding patient being discharged today. Cardiology has cleared him from their standpoint.    1230: Discharge order placed.    1344: This RN contacted hospital to home for transportation. Spoke with Kylah who notified this RN that the wife would be able to ride with them, the patient would need to be on a stretcher, no oxygen requirements, no isolation requirements and [facility] would pay for this. She set up transportation for 1445.    1345: Wife is informed of this and voiced understanding. AVS printed and reviewed with patient wife. All questions answered.    1447: [facility] at bedside to take patient and wife home. Patient removed from telemetry. Telemetry aware. PIV removed intact. Malewick removed and brief in place.    1505: Patient transported with all belongings to home by hospital to home.  
1846 Verbal SBAR report received from Cinthya HATHAWAY    1900 Bedside and Verbal shift change report given to Sergio RN (oncoming nurse) by Claudine HATHAWAY (offgoing nurse). Report included the following information Nurse Handoff Report, Index, ED Encounter Summary, ED SBAR, Intake/Output, MAR, Recent Results, and Cardiac Rhythm NSR .     
Bedside shift change report given to kate stephens (oncoming nurse) by kate jordan (offgoing nurse). Report included the following information Nurse Handoff Report, ED Encounter Summary, Adult Overview, MAR, Recent Results, and Cardiac Rhythm nsr .     End of Shift Note    Bedside shift change report given to augustina (oncoming nurse) by HEIDI TRENT RN (offgoing nurse).  Report included the following information SBAR, ED Summary, MAR, Recent Results, and Cardiac Rhythm nsr    Shift worked:  7p-7a     Shift summary and any significant changes:     na     Concerns for physician to address:  na     Zone phone for oncoming shift:          Activity:     Number times ambulated in hallways past shift: 0  Number of times OOB to chair past shift: 0    Cardiac:   Cardiac Monitoring: Yes           Access:  Current line(s): PIV     Genitourinary:   Urinary status: external catheter    Respiratory:      Chronic home O2 use?: NO  Incentive spirometer at bedside: YES       GI:     Current diet:  ADULT DIET; Dysphagia - Pureed  Passing flatus: YES  Tolerating current diet: YES       Pain Management:   Patient states pain is manageable on current regimen: YES    Skin:     Interventions: turn team and float heels    Patient Safety:  Fall Score:    Interventions: bed/chair alarm       Length of Stay:  Expected LOS: 3  Actual LOS: 0      HEIDI TRENT RN                            
Speech LAnguage Pathology EVALUATION/DISCHARGE    Patient: Nehemias Burleson Jr. (79 y.o. male)  Date: 7/20/2024  Primary Diagnosis: Symptomatic bradycardia [R00.1]       Precautions:                     ASSESSMENT :  The patient presents with presumed baseline oropharyngeal swallow function. Note patient with likely chronic dysphagia related to patient's history of dementia, stroke, and seizure. Patient's wife reports patient on a pureed diet at baseline and with great insight into safe swallow precautions as well as assistance with feeding. Patient benefits from increased time for bolus acceptance as well as tactile cues (i.e. spoon presented to patient's lips) occasionally. Patient observed to chew on straw initially, however able to initiate straw sip given increased time. Patient provided hand over hand assistance with sips from water jug as well as to assist with pacing with sips of thin liquids. Patient with efficient oral manipulation and timely oral transit with pureed trials. Patient benefited from occasional liquid wash to assist with oral cavity clearance. No overt clinical signs of aspiration observed with any consistency trialed.    Given that patient is presumably at his baseline, recommend continuation of baseline diet with general aspiration precautions outlined below.     Patient will be discharged from skilled speech-language pathology services at this time.     PLAN :  Recommendations and Planned Interventions:  Diet: Puree and thin liquids as is patient's baseline  -- Medication crushed in puree, if cleared by pharmacy  -- 1:1 assistance/supervision with feeding  -- Small bites/sips  -- Upright positioning for all PO  -- Ensure oral cavity clearance after meals    Tips for assisting people with dementia at mealtimes    Environmental modifications  · Ensure mealtimes are relaxed and unhurried; eat together to encourage participation  · Decrease distractions (e.g. turn off the TV, provide only the 
Range    Sodium 140 136 - 145 mmol/L    Potassium 4.4 3.5 - 5.1 mmol/L    Chloride 109 (H) 97 - 108 mmol/L    CO2 25 21 - 32 mmol/L    Anion Gap 6 5 - 15 mmol/L    Glucose 128 (H) 65 - 100 mg/dL    BUN 18 6 - 20 MG/DL    Creatinine 1.59 (H) 0.70 - 1.30 MG/DL    BUN/Creatinine Ratio 11 (L) 12 - 20      Est, Glom Filt Rate 43 (L) >60 ml/min/1.73m2    Calcium 8.7 8.5 - 10.1 MG/DL    Total Bilirubin 0.3 0.2 - 1.0 MG/DL    ALT 10 (L) 12 - 78 U/L    AST 5 (L) 15 - 37 U/L    Alk Phosphatase 132 (H) 45 - 117 U/L    Total Protein 6.9 6.4 - 8.2 g/dL    Albumin 2.8 (L) 3.5 - 5.0 g/dL    Globulin 4.1 (H) 2.0 - 4.0 g/dL    Albumin/Globulin Ratio 0.7 (L) 1.1 - 2.2     CBC with Auto Differential    Collection Time: 07/20/24 10:29 AM   Result Value Ref Range    WBC 7.3 4.1 - 11.1 K/uL    RBC 3.28 (L) 4.10 - 5.70 M/uL    Hemoglobin 10.4 (L) 12.1 - 17.0 g/dL    Hematocrit 32.3 (L) 36.6 - 50.3 %    MCV 98.5 80.0 - 99.0 FL    MCH 31.7 26.0 - 34.0 PG    MCHC 32.2 30.0 - 36.5 g/dL    RDW 12.5 11.5 - 14.5 %    Platelets 199 150 - 400 K/uL    MPV 11.5 8.9 - 12.9 FL    Nucleated RBCs 0.0 0  WBC    nRBC 0.00 0.00 - 0.01 K/uL    Neutrophils % 62 32 - 75 %    Lymphocytes % 26 12 - 49 %    Monocytes % 5 5 - 13 %    Eosinophils % 6 0 - 7 %    Basophils % 1 0 - 1 %    Immature Granulocytes % 0 0.0 - 0.5 %    Neutrophils Absolute 4.5 1.8 - 8.0 K/UL    Lymphocytes Absolute 1.9 0.8 - 3.5 K/UL    Monocytes Absolute 0.4 0.0 - 1.0 K/UL    Eosinophils Absolute 0.4 0.0 - 0.4 K/UL    Basophils Absolute 0.1 0.0 - 0.1 K/UL    Immature Granulocytes Absolute 0.0 0.00 - 0.04 K/UL    Differential Type AUTOMATED     Troponin    Collection Time: 07/20/24 10:29 AM   Result Value Ref Range    Troponin, High Sensitivity 13 0 - 76 ng/L       Telemetry: normal sinus rhythm      Assessment:         Plan:     Had a long discussion with wife. Do not recommend pacemaker due to his advanced dementia, wheelchair bound.     BLACK Andersen MD

## 2024-07-20 NOTE — CARE COORDINATION
Patient is clear from a CM standpoint.    [facility]: [phone]    Care Management Initial Assessment     RUR: 15% (moderate RUR)  Readmission? No  1st IM letter given? Yes - IM done 7/19 and Code 44/MOON provided 7/20.  1st  letter given: No     CM received call from attending regarding patient being discharged home today.  CM spoke with patient's wife who is agreeable to discharge home today.  Wife and RN aware that nursing will need to set up transportation on the weekends.  Wife asked if she can ride with patient via stretcher home and she could not clarify if she has money for an Uber ride home; wife and RN aware that this is up to the crew and that nursing supervisor may need to assist with having wife return home.  Wife said family is not available to take her home at this time as they are out of town.    Wife is requesting wheelchair and HH; CM notified her that this can be looked into but cannot be promised.  [facility] contact information listed on AVS.    Attending confirmed that wheelchair is not needed for this patient.     07/20/24 1236   Service Assessment   Patient Orientation Person   Cognition Alert   History Provided By Spouse   Primary Caregiver Spouse   Accompanied By/Relationship Spouse   Support Systems Spouse/Significant Other   Patient's Healthcare Decision Maker is: Legal Next of Kin   PCP Verified by CM Yes  (Desean Robin with [facility])   Last Visit to PCP Within last 3 months  (Patient had a new patient appointment on 6/14/2024)   Prior Functional Level Assistance with the following:;Mobility;Bathing;Dressing;Toileting;Cooking;Housework;Shopping;Feeding   Current Functional Level Assistance with the following:;Mobility;Bathing;Dressing;Toileting;Cooking;Housework;Shopping;Feeding   Can patient return to prior living arrangement No   Ability to make needs known: Poor   Family able to assist with home care needs: Yes   Would you like for me to discuss the discharge plan

## 2024-07-20 NOTE — PLAN OF CARE
Problem: Discharge Planning  Goal: Discharge to home or other facility with appropriate resources  Outcome: Progressing  Flowsheets (Taken 7/20/2024 0800)  Discharge to home or other facility with appropriate resources: Identify barriers to discharge with patient and caregiver     Problem: Pain  Goal: Verbalizes/displays adequate comfort level or baseline comfort level  Outcome: Progressing  Flowsheets (Taken 7/20/2024 1241)  Verbalizes/displays adequate comfort level or baseline comfort level:   Encourage patient to monitor pain and request assistance   Assess pain using appropriate pain scale     Problem: Safety - Adult  Goal: Free from fall injury  Outcome: Progressing  Flowsheets (Taken 7/20/2024 1241)  Free From Fall Injury: Instruct family/caregiver on patient safety

## 2024-07-20 NOTE — DISCHARGE INSTRUCTIONS
All of your medications from before your hospitalization are the same EXCEPT:  STOP taking carvedilol.    Please take all of your medications as prescribed. If prescribed any medications, please read all pharmacy instructions and inserts. Inform your doctor and pharmacist about all other medications and alternative therapies.  Please follow up with your PCP in 1-2 weeks to be reassessed after your hospital stay.   If you start feeling any symptoms similar to what brought you into the hospital, please come back to the ED to be re-evaluated.

## 2024-07-22 ENCOUNTER — TELEPHONE (OUTPATIENT)
Facility: CLINIC | Age: 79
End: 2024-07-22

## 2024-07-22 NOTE — TELEPHONE ENCOUNTER
Care Transitions Initial Follow Up Call    Outreach made within 2 business days of discharge: Yes    Patient: Nehemias Burleson Jr. Patient : 1945   MRN: [id number]  Reason for Admission: Syncope / bradycardia  Discharge Date: 24       Spoke with: wife Lillian    Discharge department/facility: [facility] Interactive Patient Contact:  Was patient able to fill all prescriptions: Yes  Was patient instructed to bring all medications to the follow-up visit: Yes  Is patient taking all medications as directed in the discharge summary? Yes  Does patient understand their discharge instructions: Yes  Does patient have questions or concerns that need addressed prior to 7-14 day follow up office visit: no    Additional needs identified to be addressed with provider  No needs identified             Scheduled appointment with PCP within 7-14 days  Lillian reports that pt is doing well and back to his baseline.  She has stopped his carvedilol and removed it from the pill  per discharge instructions.  She reports that she discussed with cardiology about pacemaker but decided not to do that at this time.    Follow Up  Future Appointments   Date Time Provider Department Center   2024 12:00 PM Desean Robin MD [facility] AMB   2024 To Be Determined Aide Martinez RN [facility]   2024 To Be Determined Viry Stern OT [facility]   2024 To Be Determined Katt Rice PT [facility]       JESUS GROVE RN

## 2024-07-22 NOTE — TELEPHONE ENCOUNTER
Lillian Burleson called to update that the patient was at [facility] on Saturday, 7/20 through Sunday, 7/21.  She asks for a ROBERT visit.  Lillian reports that the patient saw 2 heart specialists while at [facility].  She said that they considered a pacemaker for the patient but decided against the procedure.  She said the patient also saw a resident doctor and a speech therapist.  Lillian reports that on Saturday, the patient was in his wheel chair after she cut his hair and appeared to become overheated, and was sweating a lot.  She said he passed out for a couple of minutes, and was taken to [facility].  Her callback is [phone].

## 2024-07-23 ENCOUNTER — OFFICE VISIT (OUTPATIENT)
Facility: CLINIC | Age: 79
End: 2024-07-23

## 2024-07-23 VITALS
SYSTOLIC BLOOD PRESSURE: 127 MMHG | RESPIRATION RATE: 16 BRPM | HEART RATE: 74 BPM | TEMPERATURE: 97.4 F | DIASTOLIC BLOOD PRESSURE: 73 MMHG

## 2024-07-23 DIAGNOSIS — G40.909 SEIZURE DISORDER (HCC): Chronic | ICD-10-CM

## 2024-07-23 DIAGNOSIS — R00.1 SYMPTOMATIC BRADYCARDIA: Primary | ICD-10-CM

## 2024-07-23 DIAGNOSIS — E78.2 MIXED HYPERLIPIDEMIA: Chronic | ICD-10-CM

## 2024-07-23 DIAGNOSIS — I69.359 HEMIPLEGIA OF NONDOMINANT SIDE AS LATE EFFECT OF CEREBROVASCULAR ACCIDENT (CVA) (HCC): Chronic | ICD-10-CM

## 2024-07-23 DIAGNOSIS — N18.32 STAGE 3B CHRONIC KIDNEY DISEASE (CKD) (HCC): Chronic | ICD-10-CM

## 2024-07-23 DIAGNOSIS — F03.C0 SEVERE DEMENTIA WITHOUT BEHAVIORAL DISTURBANCE, PSYCHOTIC DISTURBANCE, MOOD DISTURBANCE, OR ANXIETY, UNSPECIFIED DEMENTIA TYPE (HCC): Chronic | ICD-10-CM

## 2024-07-23 DIAGNOSIS — I10 PRIMARY HYPERTENSION: Chronic | ICD-10-CM

## 2024-07-23 DIAGNOSIS — I63.20 CEREBROVASCULAR ACCIDENT (CVA) DUE TO OCCLUSION OF PRECEREBRAL ARTERY (HCC): Chronic | ICD-10-CM

## 2024-07-23 NOTE — PROGRESS NOTES
[facility]      PRIMARY CARE AT HOME - TRANSITIONAL CARE MANAGEMENT HOME VISIT      Date of Admission:  24  Date of Discharge: 24    The patient was contacted within two days following discharge as per protocol.      NAME: Nehemias Burleson Jr.    [facility]    :  1945  MRN:  [id number]        ASSESSMENT:     Diagnosis Orders   1. Symptomatic bradycardia        2. Primary hypertension        3. Stage 3b chronic kidney disease (CKD) (HCC)        4. Cerebrovascular accident (CVA) due to occlusion of precerebral artery (HCC)        5. 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 Seizure disorder (HCC)        7. Mixed hyperlipidemia        8. Severe dementia without behavioral disturbance, psychotic disturbance, mood disturbance, or anxiety, unspecified dementia type (HCC)              PLAN:    BRADYCARDIA: Will discontinue Coreg. Wife has a device to check pulse.    HYPERTENSION/ CKD: This problem is stable. Current treatment was continued as noted above.     CVA/ SEIZURE: This problem is stable. Will continue close surveillance.    HYPERLIPIDEMIA: This problem is stable. Will continue current treatment including diet, exercise and medication.    DEMENTIA: This problem has deteriorated. Will continue close surveillance.        SUBJECTIVE:    Chief Complaint:  Chief Complaint   Patient presents with    Other     ROBERT HOME VISIT/ BRADYCARDIA/ SYNCOPE/ CVA       History of Present Illness:    Nehemias Burleson Jr. is a 79 y.o. male who is seen for an Transitional Care Management [facility] Primary Care At Home Home Visit. It would be physically and emotionally difficult to take the patient from the home to seek primary care services in the community. The patient is homebound as a result of very limited mobility due to multiple medical problems including.a CVA. He is quite disabled.    He had a recent syncopal episode. He was taken to

## 2024-07-24 ENCOUNTER — TELEPHONE (OUTPATIENT)
Facility: CLINIC | Age: 79
End: 2024-07-24

## 2024-07-24 NOTE — TELEPHONE ENCOUNTER
[facility] Primary Care at Home (PC)   (formerly [facility] Based [facility] Supportive Services)   Phone:  [phone]      Fax:  [phone] [address], [address]  [place] [id number]    Name:  Nehemias Burleson Jr.  YOB: 1945    Called and spoke with patient's spouse Lillian Burleson to schedule Dr. Robin's one month follow up visit on Wed. Aug. 21 in the morning. She is agreeable to this date.       Dulce Robin RN, Gerontological Nursing-BC, [facility]

## 2024-07-26 ENCOUNTER — HOME CARE VISIT (OUTPATIENT)
Dept: HOME HEALTH SERVICES | Facility: HOME HEALTH | Age: 79
End: 2024-07-26

## 2024-07-26 ENCOUNTER — HOME CARE VISIT (OUTPATIENT)
Facility: HOME HEALTH | Age: 79
End: 2024-07-26

## 2024-07-26 PROCEDURE — 0221000100 HH NO PAY CLAIM PROCEDURE

## 2024-07-26 PROCEDURE — G0299 HHS/HOSPICE OF RN EA 15 MIN: HCPCS

## 2024-07-26 ASSESSMENT — ENCOUNTER SYMPTOMS: HEMOPTYSIS: 0

## 2024-07-27 VITALS
OXYGEN SATURATION: 96 % | SYSTOLIC BLOOD PRESSURE: 140 MMHG | HEART RATE: 79 BPM | RESPIRATION RATE: 16 BRPM | TEMPERATURE: 97.2 F | DIASTOLIC BLOOD PRESSURE: 88 MMHG

## 2024-07-27 ASSESSMENT — ENCOUNTER SYMPTOMS
DYSPNEA ACTIVITY LEVEL: AFTER AMBULATING MORE THAN 20 FT
TROUBLE SWALLOWING: 1
BOWEL INCONTINENCE: 1
STOOL DESCRIPTION: SOFT

## 2024-07-27 NOTE — HOME HEALTH
Reason for referral, PMH SUMMARY of clinical condition: 79-year-old male patient of Dr. Desean Robin admitted to home health after hospitalization at [facility] from 7/19-7/20/24 for syncope, symptomatic bradycardia, Type 1 second degree heart, lactic acidosis. PMH of dementia, CVA, HTN, dysphagia, left sided hemiplegia, anemia, seizure disorder, HLD, CKD4. Lungs clear. Skin intact. Old previous healed ulcer noted to sacrum with hypopigmented skin. Patient lives with wife and disabled son in one level home. Patient with advanced dementia. Only able to say a few words but not able to put together a sentence or hold a conversation. Needs help for his ADLs and feeding. Able to transfer to wheelchair with max assistance but unable to ambulate. Has fady lift for transfers. Hard to get patient out of home. High fall risk related to weakness, contractures, dementia. Patient is unsafe ambulating independently and caregivers need training to assist patient. Patient requires skilled nursing for cardiopulmonary and neurological assessment and medication management to reduce risk of rehospitalization.    Functional Mobility:  Bed Mobility (rolling/scooting): Dependent (requires assistance for all effort OR requires assist of 2 or more people)  Transfers (supine to chair and return to supine): Dependent (requires assistance for all effort OR requires assist of 2 or more people).  Patient uses device: yes  Gait:  unable to ambulate  Safety concerns with mobility include: debility, poor safety awareness, impaired judgement and caregiver demonstrates poor safety and requires further training  DME: wheelchair    Diagnosis: Advanced dementia    Subjective: Wife reports that patient sondra shows signs of pain.       Caregiver: wife.. Caregiver assists with: Medications, Meals, Bathing, ADL, IADL, Transportation and Housekeeping.    Medications reconciled and all medications are available in the home this visit. The following education was

## 2024-07-29 ENCOUNTER — HOME CARE VISIT (OUTPATIENT)
Facility: HOME HEALTH | Age: 79
End: 2024-07-29

## 2024-07-29 VITALS
OXYGEN SATURATION: 97 % | SYSTOLIC BLOOD PRESSURE: 110 MMHG | DIASTOLIC BLOOD PRESSURE: 70 MMHG | HEART RATE: 90 BPM | TEMPERATURE: 98.1 F

## 2024-07-29 PROCEDURE — G0152 HHCP-SERV OF OT,EA 15 MIN: HCPCS

## 2024-07-29 PROCEDURE — G0151 HHCP-SERV OF PT,EA 15 MIN: HCPCS

## 2024-07-29 NOTE — HOME HEALTH
Reason for referral, PMH SUMMARY of clinical condition:   Patient is an 80yo male referred to HH services following hospitalization at [facility] 7.19-7.20.24 following a syncopal episode which occurred in patient's home.  PMH significant for CVA, CKD, seizure activity, HTN, lactic acidosis, hyperlipidemia, dementia.  Patient is dependent for all ADLs/IADLs, fady lift transfers, manual WC and transport used in home to allow patient to be OOB on regular basis.  Lives with wife and son in single story home with 4 NATALIIA, has other adult children who live nearby.  Wife primary caregiver and has good knowledge of patient's needs.    Clinical Assessment/Skilled reason for admission to home health (What this means for the patient overall and need for ongoing skilled care):   Patient's wife is total advocate for patient as he is nonverbal although wife indicates he can say yes or no at times.  Patient has been dependent on wife for all daily needs for quite some time and patient is now non-ambulatory and requires fady lift for transfers to WC which wife performs independently.  Patient has excellent knowledge of patient's care needs and states patient's status is unchanged following hospitalization.  Patient to continue to have in home PCP visits by Dr. Robin and wife to continue to assist with daily needs long term.  No further skilled OT service need identified.  DC HHOT this visit.      Subjective (statement from pt/cg that is relative to why you are there):   Patient unable to provide any verbalizations, essentially nonverbal.    Caregiver: spouse.  Caregiver assists with: Medications, ADL and IADL.  Caregiver unable to assist with: na.  Caregiver is available:  24 hours/day.  Caregiver is present at this visit and did participate with clinician.    Medications unchanged from SOC.    Patient at risk for falls:  yes  Recommended requesting PT/OT orders due to fall risk:  PT/OT ordered  Patient response to recommended requesting

## 2024-07-30 VITALS
SYSTOLIC BLOOD PRESSURE: 110 MMHG | RESPIRATION RATE: 18 BRPM | OXYGEN SATURATION: 97 % | HEART RATE: 88 BPM | TEMPERATURE: 97.8 F | DIASTOLIC BLOOD PRESSURE: 70 MMHG

## 2024-07-30 NOTE — HOME HEALTH
Reason for referral, PMH SUMMARY of clinical condition: \"  Patient is an 78yo male referred to  services following hospitalization at [facility] 7.19-7.20.24 following a syncopal episode which occurred in patient's home.  PMH significant for CVA, CKD, seizure activity, HTN, lactic acidosis, hyperlipidemia, dementia.  Patient is dependent for all ADLs/IADLs, fady lift transfers, manual WC and transport used in home to allow patient to be OOB on regular basis.  Lives with wife and son in single story home with 4 NATALIIA, has other adult children who live nearby.  Wife primary caregiver and has good knowledge of patient's needs.\"       Clinical Assessment/Skilled reason for admission to home health (What this means for the patient overall and need for ongoing skilled care):   Patient's wife is primary caregiver for patient as he is nonverbal although wife indicates he can say yes or no at times.  Patient has been dependent on wife for all daily needs for quite some time. Patient is non-ambulatory and requires fady lift for transfers to . Instructed wife in PROM for carina LE to improve amy area care. He requires PROM for carina hip and knee ext and abduction. instructed wife how to perform and she performed return demonstration. instructed in pillow positioning to decrease risk for pressure ulcers and further contracture. wife is ind in performing bed mobility and fady lift transfers and gets patient out of bed daily. Wife is proficient in all care and agrees with PT evaluation only  Patient admitted to home care for PT. Physical Therapy needed for PROM instruction and positioning to prevent further le contracture and pressure ulcers. wife is ind in performing bed mobility and fady lift transfers and gets patient out of bed daily.  Wife with return demonstration for PROM and posiotning. Wife is proficient in all care and agrees with PT evaluation only  .     Functional Mobility:  Bed Mobility (rolling/scooting): Dependent

## 2024-07-31 ENCOUNTER — HOME CARE VISIT (OUTPATIENT)
Facility: HOME HEALTH | Age: 79
End: 2024-07-31

## 2024-07-31 VITALS
SYSTOLIC BLOOD PRESSURE: 142 MMHG | RESPIRATION RATE: 16 BRPM | HEART RATE: 75 BPM | OXYGEN SATURATION: 96 % | DIASTOLIC BLOOD PRESSURE: 84 MMHG | TEMPERATURE: 98.7 F

## 2024-07-31 PROCEDURE — G0299 HHS/HOSPICE OF RN EA 15 MIN: HCPCS

## 2024-08-07 ENCOUNTER — HOME CARE VISIT (OUTPATIENT)
Facility: HOME HEALTH | Age: 79
End: 2024-08-07
Payer: MEDICARE

## 2024-08-07 VITALS
SYSTOLIC BLOOD PRESSURE: 128 MMHG | HEART RATE: 90 BPM | DIASTOLIC BLOOD PRESSURE: 88 MMHG | OXYGEN SATURATION: 95 % | RESPIRATION RATE: 16 BRPM

## 2024-08-07 PROCEDURE — G0299 HHS/HOSPICE OF RN EA 15 MIN: HCPCS

## 2024-08-07 NOTE — HOME HEALTH
Subjective: Patient shows no s/s of pain today  Falls since last visit No(if yes complete the Fall Tracking Form and include bsrifallreport):   Caregiver involvement changes: no  Home health supplies by type and quantity ordered/delivered this visit include: no    Clinician asked if patient has had any physician contact since last home care visit and patient states: N/A  Clinician asked if patient has any new or changed medications and patient states:  N/A   If Yes, were medications reconciled? N/A   Was the certifying physician notified of changes in medications? N/A     Clinical assessment (what this visit means for the patient overall and need for ongoing skilled care) and progress or lack of progress towards SPECIFIC goals: Patient is sleepy today, all VS are stable. Caregiver states no problems all week, he has been eating well, drinking well, and BMs reguarly. SN needed for further education and treatment.    Written Teaching Material Utilized: N/A    Interdisciplinary communication with: N/A     Discharge planning as follows: When goals are met    Specific plan for next visit: Educate on HTN disease process

## 2024-08-14 ENCOUNTER — HOME CARE VISIT (OUTPATIENT)
Facility: HOME HEALTH | Age: 79
End: 2024-08-14
Payer: MEDICARE

## 2024-08-14 PROCEDURE — G0300 HHS/HOSPICE OF LPN EA 15 MIN: HCPCS

## 2024-08-19 VITALS
TEMPERATURE: 97.5 F | SYSTOLIC BLOOD PRESSURE: 110 MMHG | DIASTOLIC BLOOD PRESSURE: 66 MMHG | RESPIRATION RATE: 16 BRPM | OXYGEN SATURATION: 97 % | HEART RATE: 92 BPM

## 2024-08-19 ASSESSMENT — ENCOUNTER SYMPTOMS: HEMOPTYSIS: 0

## 2024-08-19 NOTE — HOME HEALTH
Subjective: Caregiver had no complaints or concerns at this time   Falls since last visit No(if yes complete the Fall Tracking Form and include bsrifallreport):   Caregiver involvement changes: Wife is primary caregiver   Home health supplies by type and quantity ordered/delivered this visit include: none     Clinician asked if patient has had any physician contact since last home care visit and patient states: NO  Clinician asked if patient has any new or changed medications and patient states:  NO   If Yes, were medications reconciled? NO   Was the certifying physician notified of changes in medications? NO     Clinical assessment (what this visit means for the patient overall and need for ongoing skilled care) and progress or lack of progress towards SPECIFIC goals: Pt continues to require SN for medication and diagnosis education     Written Teaching Material Utilized: N/A    Interdisciplinary communication with: N/A for the purpose of NA     Discharge planning as follows: Is no longer homebound    Specific plan for next visit: Assess and teach

## 2024-08-20 ENCOUNTER — TELEPHONE (OUTPATIENT)
Facility: CLINIC | Age: 79
End: 2024-08-20

## 2024-08-20 ENCOUNTER — HOME CARE VISIT (OUTPATIENT)
Facility: HOME HEALTH | Age: 79
End: 2024-08-20
Payer: MEDICARE

## 2024-08-20 VITALS
HEART RATE: 91 BPM | TEMPERATURE: 98.4 F | RESPIRATION RATE: 16 BRPM | OXYGEN SATURATION: 96 % | SYSTOLIC BLOOD PRESSURE: 118 MMHG | DIASTOLIC BLOOD PRESSURE: 78 MMHG

## 2024-08-20 PROCEDURE — G0299 HHS/HOSPICE OF RN EA 15 MIN: HCPCS

## 2024-08-20 NOTE — TELEPHONE ENCOUNTER
Called patient to confirm their in home visit with Dr. Robin on 8/21/2024, arrival between 9-1.  Spoke with wife, they confirmed the appointment and answered the covid screen questions. Does anyone in the household have covid no  Have there been any changes to insurance no or location no

## 2024-08-21 ENCOUNTER — OFFICE VISIT (OUTPATIENT)
Facility: CLINIC | Age: 79
End: 2024-08-21

## 2024-08-21 DIAGNOSIS — I69.359 HEMIPLEGIA OF NONDOMINANT SIDE AS LATE EFFECT OF CEREBROVASCULAR ACCIDENT (CVA) (HCC): Primary | Chronic | ICD-10-CM

## 2024-08-21 DIAGNOSIS — Z86.73 HISTORY OF STROKE: Chronic | ICD-10-CM

## 2024-08-21 DIAGNOSIS — N18.32 STAGE 3B CHRONIC KIDNEY DISEASE (CKD) (HCC): Chronic | ICD-10-CM

## 2024-08-21 DIAGNOSIS — I10 PRIMARY HYPERTENSION: Chronic | ICD-10-CM

## 2024-08-21 DIAGNOSIS — G40.909 SEIZURE DISORDER (HCC): Chronic | ICD-10-CM

## 2024-08-21 DIAGNOSIS — E78.2 MIXED HYPERLIPIDEMIA: Chronic | ICD-10-CM

## 2024-08-21 DIAGNOSIS — F03.C0 SEVERE DEMENTIA WITHOUT BEHAVIORAL DISTURBANCE, PSYCHOTIC DISTURBANCE, MOOD DISTURBANCE, OR ANXIETY, UNSPECIFIED DEMENTIA TYPE (HCC): Chronic | ICD-10-CM

## 2024-08-21 DIAGNOSIS — K21.9 GASTROESOPHAGEAL REFLUX DISEASE WITHOUT ESOPHAGITIS: Chronic | ICD-10-CM

## 2024-08-21 NOTE — HOME HEALTH
Subjective: Patient is nonverbal  Falls since last visit No(if yes complete the Fall Tracking Form and include bsrifallreport):   Caregiver involvement changes: no  Home health supplies by type and quantity ordered/delivered this visit include: no    Clinician asked if patient has had any physician contact since last home care visit and patient states: N/A  Clinician asked if patient has any new or changed medications and patient states:  N/A   If Yes, were medications reconciled? N/A   Was the certifying physician notified of changes in medications? N/A     Clinical assessment (what this visit means for the patient overall and need for ongoing skilled care) and progress or lack of progress towards SPECIFIC goals: Patient is non verbal from a stroke. Patient  is non ambulatory. Patient spouse was educated on medications, fall prevention, and disease process. Patient is discharged.     Written Teaching Material Utilized: N/A    Interdisciplinary communication with: N/A     Discharge planning as follows: When goals are met    Specific plan for next visit: Discharged

## 2024-08-22 VITALS — RESPIRATION RATE: 16 BRPM | DIASTOLIC BLOOD PRESSURE: 72 MMHG | HEART RATE: 92 BPM | SYSTOLIC BLOOD PRESSURE: 124 MMHG

## 2024-08-23 NOTE — PROGRESS NOTES
[facility]      PRIMARY CARE AT HOME - HOME VISIT      Name: Nehemias Burleson Jr.    Address: [unfilled]    :  1945  MRN:  [id number]        ASSESSMENT:     Diagnosis Orders   1. 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 History of stroke        3. Seizure disorder ([facility])        4. Primary hypertension        5. Stage 3b chronic kidney disease (CKD) ([facility])        6. Mixed hyperlipidemia        7. Gastroesophageal reflux disease without esophagitis        8. Severe dementia without behavioral disturbance, psychotic disturbance, mood disturbance, or anxiety, unspecified dementia type ([facility])              PLAN:    CVA/ SEIZURE DISORDER/ HYPERTENSION: This problem is stable. Current treatment was continued as noted above.     CKD: This problem is stable. Will continue close surveillance.    HYPERLIPIDEMIA: This problem is stable. Will continue current treatment including diet, exercise and medication.    GERD: This problem is stable. Will continue current treatment including antiacids and acid blocker medication.     DEMENTIA: This problem has deteriorated. Will continue close surveillance.      SUBJECTIVE:    Chief Complaint:  Chief Complaint   Patient presents with    Other     HOME VISIT: CVA/ HTN       History of Present Illness:    Nehemias Burleson Jr. is a 79 y.o. male who is seen with his wife for an [facility] Primary Care At Home Home Visit. It would be physically and emotionally difficult to take the patient from the home to seek primary care services in the community. The patient is homebound as a result of very limited mobility due to multiple medical problems including a previous CVA and HTN. He is completely and permanently disabled and requires around the clock care.       The history is obtained from the wife and previous records and is felt to be satisfactory to establish an acceptable plan of care. Health status indicators were reviewed and

## 2024-08-26 ENCOUNTER — TELEPHONE (OUTPATIENT)
Facility: CLINIC | Age: 79
End: 2024-08-26

## 2024-08-26 NOTE — TELEPHONE ENCOUNTER
[facility] Primary Care at Home (PC)   (formerly Home Based Primary Care [facility] Supportive Services)   Phone:  [phone]      Fax:  [phone] [address], [address]  [place] [id number]    Name:  Nehemias Burleson   YOB: 1945    Called and spoke with Lillian Burleson and confirmed that 9/25/24 works for Dr. Robin's one month follow up visit with Nehemias Burleson Jr.      Dulce Robin RN, Gerontological Nursing-BC, [facility]

## 2024-09-20 ENCOUNTER — TELEPHONE (OUTPATIENT)
Facility: CLINIC | Age: 79
End: 2024-09-20

## 2024-09-25 ENCOUNTER — OFFICE VISIT (OUTPATIENT)
Facility: CLINIC | Age: 79
End: 2024-09-25

## 2024-09-25 VITALS
DIASTOLIC BLOOD PRESSURE: 93 MMHG | RESPIRATION RATE: 16 BRPM | HEART RATE: 97 BPM | SYSTOLIC BLOOD PRESSURE: 147 MMHG | TEMPERATURE: 97.6 F

## 2024-09-25 DIAGNOSIS — Z91.89 NEED FOR DENTAL CARE: ICD-10-CM

## 2024-09-25 DIAGNOSIS — B35.1 ONYCHOMYCOSIS: ICD-10-CM

## 2024-09-25 DIAGNOSIS — I10 PRIMARY HYPERTENSION: Chronic | ICD-10-CM

## 2024-09-25 DIAGNOSIS — E78.2 MIXED HYPERLIPIDEMIA: Chronic | ICD-10-CM

## 2024-09-25 DIAGNOSIS — F01.C0 SEVERE VASCULAR DEMENTIA WITHOUT BEHAVIORAL DISTURBANCE, PSYCHOTIC DISTURBANCE, MOOD DISTURBANCE, OR ANXIETY (HCC): Chronic | ICD-10-CM

## 2024-09-25 DIAGNOSIS — I69.359 HEMIPLEGIA OF NONDOMINANT SIDE AS LATE EFFECT OF CEREBROVASCULAR ACCIDENT (CVA) (HCC): Primary | Chronic | ICD-10-CM

## 2024-09-25 DIAGNOSIS — Z86.73 HISTORY OF STROKE: Chronic | ICD-10-CM

## 2024-09-25 DIAGNOSIS — G40.909 SEIZURE DISORDER (HCC): Chronic | ICD-10-CM

## 2024-09-25 DIAGNOSIS — N18.32 STAGE 3B CHRONIC KIDNEY DISEASE (CKD) (HCC): Chronic | ICD-10-CM

## 2024-09-30 ENCOUNTER — TELEPHONE (OUTPATIENT)
Facility: CLINIC | Age: 79
End: 2024-09-30

## 2024-09-30 NOTE — TELEPHONE ENCOUNTER
[facility] Primary Care at Home (PC)   (formerly Home Based Primary Care & Supportive Services)   Phone:  [phone]      Fax:  [phone] [address], [address]  [place] [id number]    Name:  Nehemias Burleson Jr.  YOB: 1945    Called to schedule Primary Care at Home one month follow up visit with Dr. Desean Robin.  No answer, left message requesting call back.      Dulce Robin RN, Gerontological Nursing-BC, [facility]

## 2024-10-04 ENCOUNTER — TELEPHONE (OUTPATIENT)
Facility: CLINIC | Age: 79
End: 2024-10-04

## 2024-10-04 NOTE — TELEPHONE ENCOUNTER
[facility] Primary Care at Home (PC)   (formerly Home Based Primary Care [facility] Supportive Services)   Phone:  [phone]      Fax:  [phone] [address], [address]  [place] [address]    Name:  Nehemias Burleson Jr.  YOB: 1945      Patient's wife Lillian Burleson returned call to schedule Primary Care at Home one month follow up appointment with Dr. Desean Robin for her  Nehemias Burleson.  This nurse offered an appointment date of 10/23/24.   Ms. Burleson says that she has a house guest for the month of October and she would prefer an appointment for early Nov.  Appointment scheduled for 11/6/24.    Dulce Robin RN, Gerontological Nursing-BC, PN

## 2024-10-11 RX ORDER — LEVETIRACETAM 100 MG/ML
SOLUTION ORAL
Qty: 473 ML | Refills: 2 | Status: SHIPPED | OUTPATIENT
Start: 2024-10-11

## 2024-11-01 ENCOUNTER — TELEPHONE (OUTPATIENT)
Facility: CLINIC | Age: 79
End: 2024-11-01

## 2024-11-01 NOTE — TELEPHONE ENCOUNTER
Called patient to confirm their in home visit with Dr. Robin on 11/6/2024, arrival between 10-4.  Spoke with wife, they confirmed the appointment and answered the covid screen questions. Does anyone in the household have covid no  Have there been any changes to insurance no or location no

## 2024-11-06 ENCOUNTER — OFFICE VISIT (OUTPATIENT)
Facility: CLINIC | Age: 79
End: 2024-11-06

## 2024-11-06 VITALS
DIASTOLIC BLOOD PRESSURE: 76 MMHG | OXYGEN SATURATION: 92 % | RESPIRATION RATE: 16 BRPM | HEART RATE: 92 BPM | TEMPERATURE: 97.6 F | SYSTOLIC BLOOD PRESSURE: 112 MMHG

## 2024-11-06 DIAGNOSIS — E78.2 MIXED HYPERLIPIDEMIA: Chronic | ICD-10-CM

## 2024-11-06 DIAGNOSIS — I69.359 HEMIPLEGIA OF NONDOMINANT SIDE AS LATE EFFECT OF CEREBROVASCULAR ACCIDENT (CVA) (HCC): Chronic | ICD-10-CM

## 2024-11-06 DIAGNOSIS — G40.909 SEIZURE DISORDER (HCC): Chronic | ICD-10-CM

## 2024-11-06 DIAGNOSIS — I63.20 CEREBROVASCULAR ACCIDENT (CVA) DUE TO OCCLUSION OF PRECEREBRAL ARTERY (HCC): Primary | Chronic | ICD-10-CM

## 2024-11-06 DIAGNOSIS — F01.C0 SEVERE VASCULAR DEMENTIA WITHOUT BEHAVIORAL DISTURBANCE, PSYCHOTIC DISTURBANCE, MOOD DISTURBANCE, OR ANXIETY (HCC): Chronic | ICD-10-CM

## 2024-11-06 DIAGNOSIS — I10 ESSENTIAL HYPERTENSION: Chronic | ICD-10-CM

## 2024-11-06 DIAGNOSIS — K21.9 GASTROESOPHAGEAL REFLUX DISEASE WITHOUT ESOPHAGITIS: Chronic | ICD-10-CM

## 2024-11-06 DIAGNOSIS — N18.32 STAGE 3B CHRONIC KIDNEY DISEASE (CKD) (HCC): Chronic | ICD-10-CM

## 2024-11-06 RX ORDER — ISOSORBIDE MONONITRATE 30 MG/1
30 TABLET, EXTENDED RELEASE ORAL DAILY
Qty: 30 TABLET | Refills: 3 | Status: SHIPPED | OUTPATIENT
Start: 2024-11-06

## 2024-11-07 NOTE — PROGRESS NOTES
[facility]      Primary Care At Home - Home Visit      Name: Nehemias Burleson Jr.    Address: [address] [place] [address]    :  1945  MRN:  [id number]        ASSESSMENT:     Diagnosis Orders   1. Cerebrovascular accident (CVA) due to occlusion of precerebral artery (HCC)        2. 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 Seizure disorder (HCC)        4. Essential hypertension        5. Stage 3b chronic kidney disease (CKD) (HCC)        6. Mixed hyperlipidemia        7. Gastroesophageal reflux disease without esophagitis        8. Severe vascular dementia without behavioral disturbance, psychotic disturbance, mood disturbance, or anxiety (HCC)              PLAN:    CVA/ SEIZURE DISORDER/ HYPERTENSION/ CKD: This problem is stable. Current treatment was continued as noted above. Will recheck in 1 month with labs prior to visit (CBC, CMP, TSH. Vitamin B12, Keppra level, A1c and Lipid Profile).    HYPERLIPIDEMIA: This problem is stable. Will check labs and continue current treatment.    GERD: This problem is stable. Will continue acid suppression.    DEMENTIA: This problem has deteriorated. Will continue close surveillance.      SUBJECTIVE:    Chief Complaint:  Chief Complaint   Patient presents with    Other     HOME VISIT: CVA/ HTN/ DM       History of Present Illness:    Nehemias Burleson Jr. is a 79 y.o. male who is seen for an [facility] Primary Care At Home Home Visit. It would be physically and emotionally difficult to take the patient from the home to seek primary care services in the community. The patient is homebound as a result of very limited mobility due to multiple medical problems including a CVA with L Hemiparesis. He is lovingly cared for by his wife.    The patient has a number of chronic medical problems as listed above.    The history is obtained from the family and previous records and is felt to be satisfactory to

## 2024-11-11 ENCOUNTER — TELEPHONE (OUTPATIENT)
Facility: CLINIC | Age: 79
End: 2024-11-11

## 2024-11-11 NOTE — TELEPHONE ENCOUNTER
The patient's wife, Lillian states [facility] needs to confirm the mg for Isosorbide.Her call back number is [phone]

## 2024-11-11 NOTE — TELEPHONE ENCOUNTER
Telephone call returned to wife Lillian, she advises that [facility] is questioning change of Isosorbide to 30 mg QD.   Telephone call to Dr. Robin who confirms change of isosorbide dose from 20 mg TID to 30 mg once daily due to wife not remembering the TID dosing.  Telephone call to [facility]- spoke with pharmacist to confirm above information.  She advises that med is ready for .  Telephone call to wife Lillian to advise that med is ready to be picked up.

## 2024-11-13 ENCOUNTER — TELEPHONE (OUTPATIENT)
Facility: CLINIC | Age: 79
End: 2024-11-13

## 2024-11-13 DIAGNOSIS — I25.10 CORONARY ARTERY DISEASE INVOLVING NATIVE CORONARY ARTERY OF NATIVE HEART WITHOUT ANGINA PECTORIS: Primary | ICD-10-CM

## 2024-11-13 RX ORDER — ISOSORBIDE DINITRATE 20 MG/1
20 TABLET ORAL 3 TIMES DAILY
Qty: 270 TABLET | Refills: 3 | Status: SHIPPED | OUTPATIENT
Start: 2024-11-13

## 2024-11-13 NOTE — TELEPHONE ENCOUNTER
Lillian Burleson # [phone].  Lillian called with a question about the patient's isosorbide script.  She states that the medication label has a warning that the medication should not be crushed or chewed before swallowing.  Lillian states that she has always crushed this medication before giving to the patient.

## 2024-11-13 NOTE — TELEPHONE ENCOUNTER
Telephone call returned to wife Lillian to advise that Dr. Robin says that the new isosorbide pill is extended release and cannot be crushed.  Advised that since all of patient's meds must be crushed that Dr. Robin will send in a new prescription for regular isosorbide but she will need to go back to 3 x day dosing of the med.  Advised Dr. Robin will send me to pharmacy later today.

## 2024-11-25 ENCOUNTER — TELEPHONE (OUTPATIENT)
Facility: CLINIC | Age: 79
End: 2024-11-25

## 2024-11-25 NOTE — TELEPHONE ENCOUNTER
Telephone call from wife Lillian requesting clarification of Isosorbide. Clarified that on 11/13 I spoke with her to \"advise that the new isosorbide pill is extended release and cannot be crushed. Advised that since all of patient's meds must be crushed that Dr. Robin will send in a new prescription for regular isosorbide but she will need to go back to 3 x day dosing of the med.\"  Lillian then began to tell me when pt is sleeping at two of those dose times.  Advised Lillian that patient sleeps most of the time so okay to wake him up for crushed pills in applesauce then he can go right back to sleep.  She tried to bargain with this nurse for 2 x day dosing of the med.  Advised that Isosorbide only lasts about 8 hours per dose so that is why it must be taken TID.  She appreciated clarification but I am unsure she will actually dose him TID.

## 2024-12-02 ENCOUNTER — TELEPHONE (OUTPATIENT)
Facility: CLINIC | Age: 79
End: 2024-12-02

## 2024-12-02 NOTE — TELEPHONE ENCOUNTER
[facility] Primary Care at Home (PC)   (formerly Home Based Primary Care & Supportive Services)   Phone:  [phone]      Fax:  [phone] [address], [address]  [place] [id number]    Name:  Nehemias Burleson Jr.  YOB: 1945      Called and spoke with patient's spouse Lillian Burleson and scheduled Nehemias's Primary Care at Home one month follow up visit with Dr. Desean Robin on 12/9/24.  Cat    Dulce Robin RN, Gerontological Nursing-BC, PN

## 2024-12-02 NOTE — TELEPHONE ENCOUNTER
Lillian Benjy called to request clarification on the patient's isosorbide scripts.  She said that the 30 mg tabs are not crushable so the patient cannot take the pills.  She is using the 20 mgs supply but wants to know if there is a medication to replace the isosorbide, that can be taken 1x/day.  Lillian states that the [facility] pharmacy is calling/sending her refill requests and she is not sure what to do.  She asks for a callback at [phone].

## 2024-12-03 DIAGNOSIS — E78.2 MIXED HYPERLIPIDEMIA: Primary | ICD-10-CM

## 2024-12-03 DIAGNOSIS — R73.09 ELEVATED GLUCOSE LEVEL: ICD-10-CM

## 2024-12-03 DIAGNOSIS — Z13.29 THYROID DISORDER SCREENING: ICD-10-CM

## 2024-12-03 DIAGNOSIS — I10 ESSENTIAL HYPERTENSION: ICD-10-CM

## 2024-12-03 DIAGNOSIS — G40.909 NONINTRACTABLE EPILEPSY WITHOUT STATUS EPILEPTICUS, UNSPECIFIED EPILEPSY TYPE (HCC): ICD-10-CM

## 2024-12-03 DIAGNOSIS — E53.9 VITAMIN B DEFICIENCY: ICD-10-CM

## 2024-12-05 ENCOUNTER — TELEPHONE (OUTPATIENT)
Facility: CLINIC | Age: 79
End: 2024-12-05

## 2024-12-05 ENCOUNTER — NURSE ONLY (OUTPATIENT)
Facility: CLINIC | Age: 79
End: 2024-12-05

## 2024-12-05 DIAGNOSIS — E78.2 MIXED HYPERLIPIDEMIA: ICD-10-CM

## 2024-12-05 DIAGNOSIS — R73.09 ELEVATED GLUCOSE LEVEL: ICD-10-CM

## 2024-12-05 DIAGNOSIS — E53.9 VITAMIN B DEFICIENCY: ICD-10-CM

## 2024-12-05 DIAGNOSIS — I10 ESSENTIAL HYPERTENSION: ICD-10-CM

## 2024-12-05 DIAGNOSIS — Z13.29 THYROID DISORDER SCREENING: ICD-10-CM

## 2024-12-05 DIAGNOSIS — G40.909 NONINTRACTABLE EPILEPSY WITHOUT STATUS EPILEPTICUS, UNSPECIFIED EPILEPSY TYPE (HCC): ICD-10-CM

## 2024-12-06 ENCOUNTER — TELEPHONE (OUTPATIENT)
Facility: CLINIC | Age: 79
End: 2024-12-06

## 2024-12-06 LAB
ALBUMIN SERPL-MCNC: 2.7 G/DL (ref 3.5–5)
ALBUMIN/GLOB SERPL: 0.6 (ref 1.1–2.2)
ALP SERPL-CCNC: 142 U/L (ref 45–117)
ALT SERPL-CCNC: 14 U/L (ref 12–78)
ANION GAP SERPL CALC-SCNC: 3 MMOL/L (ref 2–12)
AST SERPL-CCNC: <3 U/L (ref 15–37)
BASOPHILS # BLD: 0.1 K/UL (ref 0–0.1)
BASOPHILS NFR BLD: 1 % (ref 0–1)
BILIRUB SERPL-MCNC: 0.3 MG/DL (ref 0.2–1)
BUN SERPL-MCNC: 25 MG/DL (ref 6–20)
BUN/CREAT SERPL: 15 (ref 12–20)
CALCIUM SERPL-MCNC: 8.9 MG/DL (ref 8.5–10.1)
CHLORIDE SERPL-SCNC: 111 MMOL/L (ref 97–108)
CHOLEST SERPL-MCNC: 90 MG/DL
CO2 SERPL-SCNC: 24 MMOL/L (ref 21–32)
CREAT SERPL-MCNC: 1.63 MG/DL (ref 0.7–1.3)
DIFFERENTIAL METHOD BLD: ABNORMAL
EOSINOPHIL # BLD: 0.3 K/UL (ref 0–0.4)
EOSINOPHIL NFR BLD: 5 % (ref 0–7)
ERYTHROCYTE [DISTWIDTH] IN BLOOD BY AUTOMATED COUNT: 13 % (ref 11.5–14.5)
EST. AVERAGE GLUCOSE BLD GHB EST-MCNC: 120 MG/DL
GLOBULIN SER CALC-MCNC: 4.4 G/DL (ref 2–4)
GLUCOSE SERPL-MCNC: 99 MG/DL (ref 65–100)
HBA1C MFR BLD: 5.8 % (ref 4–5.6)
HCT VFR BLD AUTO: 32.8 % (ref 36.6–50.3)
HDLC SERPL-MCNC: 44 MG/DL
HDLC SERPL: 2 (ref 0–5)
HGB BLD-MCNC: 10.4 G/DL (ref 12.1–17)
IMM GRANULOCYTES # BLD AUTO: 0 K/UL (ref 0–0.04)
IMM GRANULOCYTES NFR BLD AUTO: 0 % (ref 0–0.5)
LDLC SERPL CALC-MCNC: 38.4 MG/DL (ref 0–100)
LYMPHOCYTES # BLD: 1.5 K/UL (ref 0.8–3.5)
LYMPHOCYTES NFR BLD: 24 % (ref 12–49)
MCH RBC QN AUTO: 30.4 PG (ref 26–34)
MCHC RBC AUTO-ENTMCNC: 31.7 G/DL (ref 30–36.5)
MCV RBC AUTO: 95.9 FL (ref 80–99)
MONOCYTES # BLD: 0.5 K/UL (ref 0–1)
MONOCYTES NFR BLD: 9 % (ref 5–13)
NEUTS SEG # BLD: 3.7 K/UL (ref 1.8–8)
NEUTS SEG NFR BLD: 61 % (ref 32–75)
NRBC # BLD: 0 K/UL (ref 0–0.01)
NRBC BLD-RTO: 0 PER 100 WBC
PLATELET # BLD AUTO: 272 K/UL (ref 150–400)
PMV BLD AUTO: 11.4 FL (ref 8.9–12.9)
POTASSIUM SERPL-SCNC: 4.4 MMOL/L (ref 3.5–5.1)
PROT SERPL-MCNC: 7.1 G/DL (ref 6.4–8.2)
RBC # BLD AUTO: 3.42 M/UL (ref 4.1–5.7)
SODIUM SERPL-SCNC: 138 MMOL/L (ref 136–145)
TRIGL SERPL-MCNC: 38 MG/DL
TSH SERPL DL<=0.05 MIU/L-ACNC: 0.81 UIU/ML (ref 0.36–3.74)
VIT B12 SERPL-MCNC: >2000 PG/ML (ref 193–986)
VLDLC SERPL CALC-MCNC: 7.6 MG/DL
WBC # BLD AUTO: 6.1 K/UL (ref 4.1–11.1)

## 2024-12-06 NOTE — TELEPHONE ENCOUNTER
Called patient to confirm their in home visit with Dr. Robin on 12/09/2024, arrival between 10-4.  Spoke with wife, they confirmed the appointment and answered the covid screen questions. Does anyone in the household have covid no  Have there been any changes to insurance no or location no

## 2024-12-06 NOTE — TELEPHONE ENCOUNTER
VM-11:54 am- Lillian Burleson left a message asking for additional podiatry referrals.  She states that she called two of the podiatry resources on the patient resource list but they were not available.     I returned Lillian's call and she confirmed that she was able to contact [facility].  She states that they will visit the patient 12/12/24 for Podiatry care.

## 2024-12-06 NOTE — TELEPHONE ENCOUNTER
Call placed and spoke with patient's wife, Lillian Burleson. I provided the information given by Dr. Robin \"labs are stable. No new medications are needed. Will recheck as scheduled\".   Wife asked about podiatry - I encouraged her to call [facility] (but also discussed that they have been unreliable in the past).

## 2024-12-06 NOTE — TELEPHONE ENCOUNTER
----- Message from Dr. Desean Robin MD sent at 12/6/2024 10:07 AM EST -----  Please let wife know that labs are stable. No new medications are needed. Will recheck as scheduled.     Thanks.

## 2024-12-08 LAB — LEVETIRACETAM SERPL-MCNC: 25.3 UG/ML (ref 10–40)

## 2024-12-09 ENCOUNTER — OFFICE VISIT (OUTPATIENT)
Facility: CLINIC | Age: 79
End: 2024-12-09

## 2024-12-09 DIAGNOSIS — Z86.73 HISTORY OF STROKE: Chronic | ICD-10-CM

## 2024-12-09 DIAGNOSIS — I10 ESSENTIAL HYPERTENSION: Chronic | ICD-10-CM

## 2024-12-09 DIAGNOSIS — N18.32 STAGE 3B CHRONIC KIDNEY DISEASE (CKD) (HCC): Chronic | ICD-10-CM

## 2024-12-09 DIAGNOSIS — I69.359 HEMIPLEGIA OF NONDOMINANT SIDE AS LATE EFFECT OF CEREBROVASCULAR ACCIDENT (CVA) (HCC): Primary | Chronic | ICD-10-CM

## 2024-12-09 DIAGNOSIS — I25.10 CORONARY ARTERY DISEASE INVOLVING NATIVE CORONARY ARTERY OF NATIVE HEART WITHOUT ANGINA PECTORIS: Chronic | ICD-10-CM

## 2024-12-09 DIAGNOSIS — G40.909 SEIZURE DISORDER (HCC): Chronic | ICD-10-CM

## 2024-12-09 RX ORDER — ISOSORBIDE DINITRATE 20 MG/1
20 TABLET ORAL 3 TIMES DAILY
Qty: 270 TABLET | Refills: 3 | Status: SHIPPED | OUTPATIENT
Start: 2024-12-09

## 2024-12-10 VITALS
SYSTOLIC BLOOD PRESSURE: 121 MMHG | OXYGEN SATURATION: 97 % | DIASTOLIC BLOOD PRESSURE: 67 MMHG | RESPIRATION RATE: 16 BRPM | HEART RATE: 118 BPM

## 2024-12-10 PROBLEM — I63.20 CEREBROVASCULAR ACCIDENT (CVA) DUE TO OCCLUSION OF PRECEREBRAL ARTERY (HCC): Chronic | Status: RESOLVED | Noted: 2024-07-12 | Resolved: 2024-12-10

## 2024-12-10 RX ORDER — ISOSORBIDE MONONITRATE 30 MG/1
30 TABLET, EXTENDED RELEASE ORAL DAILY
Qty: 30 TABLET | Refills: 3 | COMMUNITY
Start: 2024-12-10 | End: 2024-12-10

## 2024-12-11 NOTE — PROGRESS NOTES
[facility]      Primary Care At Home - Home Visit      Name: Nehemias Burleson Jr.    Address: [address] [place] [address]    :  1945  MRN:  [id number]      ASSESSMENT:     Diagnosis Orders   1. 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 Seizure disorder (HCC)        3. History of stroke        4. Coronary artery disease involving native coronary artery of native heart without angina pectoris  isosorbide dinitrate (ISORDIL) 20 MG tablet      5. Essential hypertension        6. Stage 3b chronic kidney disease (CKD) (HCC)              PLAN:    CVA/ SEIZURE DISORDER/ HEMIPLEGIA: This problem is stable. Will continue current routine. I discussed care with his wife and offered additional help if needed.     CAD/ HYPERTENSION: This problem is stable. Current treatment was continued as noted above.     CKD: This problem is stable. Will continue close surveillance.      SUBJECTIVE:    Chief Complaint:  No chief complaint on file.      History of Present Illness:    Nehemias Burleson Jr. is a 79 y.o. male who is seen with his wife for an [facility] Primary Care At Home Home Visit. It would be physically and emotionally difficult to take the patient from the home to seek primary care services in the community. The patient is homebound as a result of very limited mobility due to multiple medical problems including a previous CVA. He remains completely disabled.     The patient has a number of chronic medical problems as listed above.    The history is obtained from the patient, family and previous records and is felt to be satisfactory to establish an acceptable plan of care. Health status indicators were reviewed and there are no changes except as noted above. The past medical history, family history, social history and ethnic considerations have been updated as needed. The patient denies any constitutional, ENT,

## 2024-12-17 ENCOUNTER — TELEPHONE (OUTPATIENT)
Facility: CLINIC | Age: 79
End: 2024-12-17

## 2024-12-17 NOTE — TELEPHONE ENCOUNTER
Telephone call returned to wife Lillian, in close questioning she states she is referring to the known tremors that he has been having most of this year.  He has tremors sometimes isolated to an extremity and sometimes the whole body, these occur 4 - 5 x day but are of short duration.  She states she did not mean to say seizure but could not remember the word tremor.  We discussed that she should continue to give the increased dose of Keppra prescribed by Dr. Robin, 7.5 ml BID.  Advised to contact our office if nature of tremor changes or patient's level of consciousness/responsiveness changes.

## 2024-12-17 NOTE — TELEPHONE ENCOUNTER
Lillian called to report that the patient had a seizure.  She states that he has tremors daily since he came home from his hospital stay, which was 3-4 mos ago.  Lillian said that Dr. Robin increased the patient's seizure medication but it is not helping.  Lillian asks for a callback at [phone]

## 2024-12-18 ENCOUNTER — TELEPHONE (OUTPATIENT)
Facility: CLINIC | Age: 79
End: 2024-12-18

## 2024-12-18 NOTE — TELEPHONE ENCOUNTER
Telephone call from wife requesting home visit by Dr. Robin.  She states she is worried about patient, he is eating very little and holding food in his mouth often.  Discussed that holding food in his mouth is a symptom of his advancing dementia.  She is also concerned about his tremors that are occurring multiple times a day - primarily on left side - which is side is stroke affected.    Second call to wife to offer visit with Dr. Robin tomorrow, advised he may arrive in home as early as 7:30 AM.  Lillian accepted visit and it was scheduled.

## 2024-12-19 ENCOUNTER — HOSPITAL ENCOUNTER (INPATIENT)
Facility: HOSPITAL | Age: 79
LOS: 7 days | Discharge: HOME HEALTH | DRG: 100 | End: 2024-12-26
Attending: EMERGENCY MEDICINE | Admitting: HOSPITALIST
Payer: MEDICARE

## 2024-12-19 ENCOUNTER — OFFICE VISIT (OUTPATIENT)
Facility: CLINIC | Age: 79
End: 2024-12-19

## 2024-12-19 ENCOUNTER — APPOINTMENT (OUTPATIENT)
Facility: HOSPITAL | Age: 79
DRG: 100 | End: 2024-12-19
Payer: MEDICARE

## 2024-12-19 ENCOUNTER — TELEPHONE (OUTPATIENT)
Facility: CLINIC | Age: 79
End: 2024-12-19

## 2024-12-19 VITALS
HEART RATE: 107 BPM | OXYGEN SATURATION: 91 % | SYSTOLIC BLOOD PRESSURE: 154 MMHG | DIASTOLIC BLOOD PRESSURE: 91 MMHG | RESPIRATION RATE: 16 BRPM | TEMPERATURE: 97.8 F

## 2024-12-19 DIAGNOSIS — I10 ESSENTIAL HYPERTENSION: Chronic | ICD-10-CM

## 2024-12-19 DIAGNOSIS — E87.0 HYPERNATREMIA: ICD-10-CM

## 2024-12-19 DIAGNOSIS — I69.359 HEMIPLEGIA OF NONDOMINANT SIDE AS LATE EFFECT OF CEREBROVASCULAR ACCIDENT (CVA) (HCC): Chronic | ICD-10-CM

## 2024-12-19 DIAGNOSIS — G40.909 SEIZURE DISORDER (HCC): Primary | Chronic | ICD-10-CM

## 2024-12-19 DIAGNOSIS — F01.C0 SEVERE VASCULAR DEMENTIA WITHOUT BEHAVIORAL DISTURBANCE, PSYCHOTIC DISTURBANCE, MOOD DISTURBANCE, OR ANXIETY (HCC): Chronic | ICD-10-CM

## 2024-12-19 DIAGNOSIS — Z86.73 HISTORY OF STROKE: Chronic | ICD-10-CM

## 2024-12-19 DIAGNOSIS — G40.919 BREAKTHROUGH SEIZURE (HCC): Primary | ICD-10-CM

## 2024-12-19 DIAGNOSIS — N17.9 AKI (ACUTE KIDNEY INJURY) (HCC): ICD-10-CM

## 2024-12-19 DIAGNOSIS — N18.32 STAGE 3B CHRONIC KIDNEY DISEASE (CKD) (HCC): Chronic | ICD-10-CM

## 2024-12-19 PROBLEM — R56.9 SEIZURE (HCC): Status: ACTIVE | Noted: 2024-12-19

## 2024-12-19 PROBLEM — G40.901 STATUS EPILEPTICUS (HCC): Status: ACTIVE | Noted: 2024-12-19

## 2024-12-19 LAB
ALBUMIN SERPL-MCNC: 3.4 G/DL (ref 3.5–5)
ALBUMIN/GLOB SERPL: 0.6 (ref 1.1–2.2)
ALP SERPL-CCNC: 143 U/L (ref 45–117)
ALT SERPL-CCNC: 13 U/L (ref 12–78)
ANION GAP SERPL CALC-SCNC: 4 MMOL/L (ref 2–12)
AST SERPL-CCNC: 4 U/L (ref 15–37)
BASOPHILS # BLD: 0.1 K/UL (ref 0–0.1)
BASOPHILS NFR BLD: 1 % (ref 0–1)
BILIRUB SERPL-MCNC: 0.4 MG/DL (ref 0.2–1)
BUN SERPL-MCNC: 43 MG/DL (ref 6–20)
BUN/CREAT SERPL: 18 (ref 12–20)
CALCIUM SERPL-MCNC: 9.8 MG/DL (ref 8.5–10.1)
CHLORIDE SERPL-SCNC: 122 MMOL/L (ref 97–108)
CO2 SERPL-SCNC: 23 MMOL/L (ref 21–32)
CREAT SERPL-MCNC: 2.33 MG/DL (ref 0.7–1.3)
DIFFERENTIAL METHOD BLD: ABNORMAL
EOSINOPHIL # BLD: 0 K/UL (ref 0–0.4)
EOSINOPHIL NFR BLD: 0 % (ref 0–7)
ERYTHROCYTE [DISTWIDTH] IN BLOOD BY AUTOMATED COUNT: 13.8 % (ref 11.5–14.5)
GLOBULIN SER CALC-MCNC: 5.4 G/DL (ref 2–4)
GLUCOSE SERPL-MCNC: 112 MG/DL (ref 65–100)
HCT VFR BLD AUTO: 37.8 % (ref 36.6–50.3)
HGB BLD-MCNC: 12.3 G/DL (ref 12.1–17)
IMM GRANULOCYTES # BLD AUTO: 0 K/UL (ref 0–0.04)
IMM GRANULOCYTES NFR BLD AUTO: 0 % (ref 0–0.5)
LYMPHOCYTES # BLD: 1.5 K/UL (ref 0.8–3.5)
LYMPHOCYTES NFR BLD: 11 % (ref 12–49)
MAGNESIUM SERPL-MCNC: 2.4 MG/DL (ref 1.6–2.4)
MCH RBC QN AUTO: 31.3 PG (ref 26–34)
MCHC RBC AUTO-ENTMCNC: 32.5 G/DL (ref 30–36.5)
MCV RBC AUTO: 96.2 FL (ref 80–99)
MONOCYTES # BLD: 0.6 K/UL (ref 0–1)
MONOCYTES NFR BLD: 4 % (ref 5–13)
NEUTS SEG # BLD: 11.2 K/UL (ref 1.8–8)
NEUTS SEG NFR BLD: 84 % (ref 32–75)
NRBC # BLD: 0 K/UL (ref 0–0.01)
NRBC BLD-RTO: 0 PER 100 WBC
PLATELET # BLD AUTO: 258 K/UL (ref 150–400)
PMV BLD AUTO: 11.5 FL (ref 8.9–12.9)
POTASSIUM SERPL-SCNC: 4.5 MMOL/L (ref 3.5–5.1)
PROT SERPL-MCNC: 8.8 G/DL (ref 6.4–8.2)
RBC # BLD AUTO: 3.93 M/UL (ref 4.1–5.7)
SODIUM SERPL-SCNC: 149 MMOL/L (ref 136–145)
WBC # BLD AUTO: 13.4 K/UL (ref 4.1–11.1)

## 2024-12-19 PROCEDURE — 6360000002 HC RX W HCPCS: Performed by: INTERNAL MEDICINE

## 2024-12-19 PROCEDURE — 95706 EEG WO VID 2-12HR INTMT MNTR: CPT

## 2024-12-19 PROCEDURE — 6360000002 HC RX W HCPCS: Performed by: HOSPITALIST

## 2024-12-19 PROCEDURE — 70450 CT HEAD/BRAIN W/O DYE: CPT

## 2024-12-19 PROCEDURE — 2580000003 HC RX 258: Performed by: EMERGENCY MEDICINE

## 2024-12-19 PROCEDURE — 6360000002 HC RX W HCPCS: Performed by: PSYCHIATRY & NEUROLOGY

## 2024-12-19 PROCEDURE — 83735 ASSAY OF MAGNESIUM: CPT

## 2024-12-19 PROCEDURE — XX20X89 MONITORING OF BRAIN ELECTRICAL ACTIVITY, COMPUTER-AIDED DETECTION AND NOTIFICATION, NEW TECHNOLOGY GROUP 9: ICD-10-PCS | Performed by: PSYCHIATRY & NEUROLOGY

## 2024-12-19 PROCEDURE — 2580000003 HC RX 258: Performed by: PSYCHIATRY & NEUROLOGY

## 2024-12-19 PROCEDURE — 2500000003 HC RX 250 WO HCPCS: Performed by: INTERNAL MEDICINE

## 2024-12-19 PROCEDURE — 80053 COMPREHEN METABOLIC PANEL: CPT

## 2024-12-19 PROCEDURE — 93005 ELECTROCARDIOGRAM TRACING: CPT | Performed by: EMERGENCY MEDICINE

## 2024-12-19 PROCEDURE — 87040 BLOOD CULTURE FOR BACTERIA: CPT

## 2024-12-19 PROCEDURE — 85025 COMPLETE CBC W/AUTO DIFF WBC: CPT

## 2024-12-19 PROCEDURE — 95717 EEG PHYS/QHP 2-12 HR W/O VID: CPT | Performed by: PSYCHIATRY & NEUROLOGY

## 2024-12-19 PROCEDURE — 84145 PROCALCITONIN (PCT): CPT

## 2024-12-19 PROCEDURE — 2580000003 HC RX 258: Performed by: INTERNAL MEDICINE

## 2024-12-19 PROCEDURE — 71045 X-RAY EXAM CHEST 1 VIEW: CPT

## 2024-12-19 PROCEDURE — 83605 ASSAY OF LACTIC ACID: CPT

## 2024-12-19 PROCEDURE — 36415 COLL VENOUS BLD VENIPUNCTURE: CPT

## 2024-12-19 PROCEDURE — 84100 ASSAY OF PHOSPHORUS: CPT

## 2024-12-19 PROCEDURE — 96375 TX/PRO/DX INJ NEW DRUG ADDON: CPT

## 2024-12-19 PROCEDURE — 2500000003 HC RX 250 WO HCPCS: Performed by: HOSPITALIST

## 2024-12-19 PROCEDURE — 99285 EMERGENCY DEPT VISIT HI MDM: CPT

## 2024-12-19 PROCEDURE — 96374 THER/PROPH/DIAG INJ IV PUSH: CPT

## 2024-12-19 PROCEDURE — 2500000003 HC RX 250 WO HCPCS: Performed by: PSYCHIATRY & NEUROLOGY

## 2024-12-19 PROCEDURE — 2000000000 HC ICU R&B

## 2024-12-19 PROCEDURE — 6360000002 HC RX W HCPCS: Performed by: EMERGENCY MEDICINE

## 2024-12-19 PROCEDURE — 95816 EEG AWAKE AND DROWSY: CPT

## 2024-12-19 RX ORDER — FOLIC ACID 1 MG/1
1000 TABLET ORAL DAILY
Status: DISCONTINUED | OUTPATIENT
Start: 2024-12-19 | End: 2024-12-26 | Stop reason: HOSPADM

## 2024-12-19 RX ORDER — POLYETHYLENE GLYCOL 3350 17 G/17G
17 POWDER, FOR SOLUTION ORAL DAILY PRN
Status: DISCONTINUED | OUTPATIENT
Start: 2024-12-19 | End: 2024-12-20

## 2024-12-19 RX ORDER — LORAZEPAM 2 MG/ML
1 INJECTION INTRAMUSCULAR EVERY 6 HOURS PRN
Status: DISCONTINUED | OUTPATIENT
Start: 2024-12-19 | End: 2024-12-26 | Stop reason: HOSPADM

## 2024-12-19 RX ORDER — ONDANSETRON 2 MG/ML
4 INJECTION INTRAMUSCULAR; INTRAVENOUS EVERY 6 HOURS PRN
Status: DISCONTINUED | OUTPATIENT
Start: 2024-12-19 | End: 2024-12-26 | Stop reason: HOSPADM

## 2024-12-19 RX ORDER — ACETAMINOPHEN 650 MG/1
650 SUPPOSITORY RECTAL EVERY 6 HOURS PRN
Status: DISCONTINUED | OUTPATIENT
Start: 2024-12-19 | End: 2024-12-19 | Stop reason: SDUPTHER

## 2024-12-19 RX ORDER — POLYETHYLENE GLYCOL 3350 17 G/17G
17 POWDER, FOR SOLUTION ORAL DAILY PRN
Status: DISCONTINUED | OUTPATIENT
Start: 2024-12-19 | End: 2024-12-26 | Stop reason: HOSPADM

## 2024-12-19 RX ORDER — ATORVASTATIN CALCIUM 40 MG/1
40 TABLET, FILM COATED ORAL DAILY
Status: DISCONTINUED | OUTPATIENT
Start: 2024-12-19 | End: 2024-12-26 | Stop reason: HOSPADM

## 2024-12-19 RX ORDER — LEVETIRACETAM 500 MG/5ML
750 INJECTION, SOLUTION, CONCENTRATE INTRAVENOUS EVERY 12 HOURS
Status: DISCONTINUED | OUTPATIENT
Start: 2024-12-19 | End: 2024-12-26 | Stop reason: HOSPADM

## 2024-12-19 RX ORDER — LEVETIRACETAM 500 MG/5ML
500 INJECTION, SOLUTION, CONCENTRATE INTRAVENOUS EVERY 12 HOURS
Status: DISCONTINUED | OUTPATIENT
Start: 2024-12-19 | End: 2024-12-19

## 2024-12-19 RX ORDER — ONDANSETRON 4 MG/1
4 TABLET, ORALLY DISINTEGRATING ORAL EVERY 8 HOURS PRN
Status: DISCONTINUED | OUTPATIENT
Start: 2024-12-19 | End: 2024-12-20

## 2024-12-19 RX ORDER — HEPARIN SODIUM 5000 [USP'U]/ML
5000 INJECTION, SOLUTION INTRAVENOUS; SUBCUTANEOUS EVERY 8 HOURS SCHEDULED
Status: DISCONTINUED | OUTPATIENT
Start: 2024-12-19 | End: 2024-12-19

## 2024-12-19 RX ORDER — SODIUM CHLORIDE 0.9 % (FLUSH) 0.9 %
5-40 SYRINGE (ML) INJECTION PRN
Status: DISCONTINUED | OUTPATIENT
Start: 2024-12-19 | End: 2024-12-26 | Stop reason: HOSPADM

## 2024-12-19 RX ORDER — LORAZEPAM 2 MG/ML
2 INJECTION INTRAMUSCULAR ONCE
Status: COMPLETED | OUTPATIENT
Start: 2024-12-19 | End: 2024-12-19

## 2024-12-19 RX ORDER — SODIUM CHLORIDE 450 MG/100ML
INJECTION, SOLUTION INTRAVENOUS CONTINUOUS
Status: DISCONTINUED | OUTPATIENT
Start: 2024-12-19 | End: 2024-12-20

## 2024-12-19 RX ORDER — FERROUS SULFATE 325(65) MG
325 TABLET ORAL
Status: DISCONTINUED | OUTPATIENT
Start: 2024-12-20 | End: 2024-12-26 | Stop reason: HOSPADM

## 2024-12-19 RX ORDER — SODIUM CHLORIDE 0.9 % (FLUSH) 0.9 %
5-40 SYRINGE (ML) INJECTION PRN
Status: DISCONTINUED | OUTPATIENT
Start: 2024-12-19 | End: 2024-12-20

## 2024-12-19 RX ORDER — SODIUM CHLORIDE 0.9 % (FLUSH) 0.9 %
5-40 SYRINGE (ML) INJECTION EVERY 12 HOURS SCHEDULED
Status: DISCONTINUED | OUTPATIENT
Start: 2024-12-19 | End: 2024-12-26 | Stop reason: HOSPADM

## 2024-12-19 RX ORDER — LEVETIRACETAM 500 MG/5ML
1000 INJECTION, SOLUTION, CONCENTRATE INTRAVENOUS ONCE
Status: COMPLETED | OUTPATIENT
Start: 2024-12-19 | End: 2024-12-19

## 2024-12-19 RX ORDER — ONDANSETRON 4 MG/1
4 TABLET, ORALLY DISINTEGRATING ORAL EVERY 8 HOURS PRN
Status: DISCONTINUED | OUTPATIENT
Start: 2024-12-19 | End: 2024-12-26 | Stop reason: HOSPADM

## 2024-12-19 RX ORDER — HYDRALAZINE HYDROCHLORIDE 20 MG/ML
10 INJECTION INTRAMUSCULAR; INTRAVENOUS EVERY 6 HOURS PRN
Status: DISCONTINUED | OUTPATIENT
Start: 2024-12-19 | End: 2024-12-26 | Stop reason: HOSPADM

## 2024-12-19 RX ORDER — LORAZEPAM 2 MG/ML
2 INJECTION INTRAMUSCULAR ONCE
Status: DISCONTINUED | OUTPATIENT
Start: 2024-12-19 | End: 2024-12-19

## 2024-12-19 RX ORDER — ACETAMINOPHEN 325 MG/1
650 TABLET ORAL EVERY 6 HOURS PRN
Status: DISCONTINUED | OUTPATIENT
Start: 2024-12-19 | End: 2024-12-19 | Stop reason: SDUPTHER

## 2024-12-19 RX ORDER — SODIUM CHLORIDE 9 MG/ML
INJECTION, SOLUTION INTRAVENOUS PRN
Status: DISCONTINUED | OUTPATIENT
Start: 2024-12-19 | End: 2024-12-26 | Stop reason: HOSPADM

## 2024-12-19 RX ORDER — ACETAMINOPHEN 650 MG/1
650 SUPPOSITORY RECTAL EVERY 6 HOURS PRN
Status: DISCONTINUED | OUTPATIENT
Start: 2024-12-19 | End: 2024-12-26 | Stop reason: HOSPADM

## 2024-12-19 RX ORDER — MAGNESIUM SULFATE 1 G/100ML
1000 INJECTION INTRAVENOUS ONCE
Status: COMPLETED | OUTPATIENT
Start: 2024-12-19 | End: 2024-12-19

## 2024-12-19 RX ORDER — HEPARIN SODIUM 5000 [USP'U]/ML
5000 INJECTION, SOLUTION INTRAVENOUS; SUBCUTANEOUS EVERY 8 HOURS SCHEDULED
Status: DISCONTINUED | OUTPATIENT
Start: 2024-12-19 | End: 2024-12-26 | Stop reason: HOSPADM

## 2024-12-19 RX ORDER — MULTIVITAMIN WITH IRON
1000 TABLET ORAL DAILY
Status: DISCONTINUED | OUTPATIENT
Start: 2024-12-19 | End: 2024-12-26 | Stop reason: HOSPADM

## 2024-12-19 RX ORDER — ONDANSETRON 2 MG/ML
4 INJECTION INTRAMUSCULAR; INTRAVENOUS EVERY 6 HOURS PRN
Status: DISCONTINUED | OUTPATIENT
Start: 2024-12-19 | End: 2024-12-20

## 2024-12-19 RX ORDER — ACETAMINOPHEN 325 MG/1
650 TABLET ORAL EVERY 6 HOURS PRN
Status: DISCONTINUED | OUTPATIENT
Start: 2024-12-19 | End: 2024-12-26 | Stop reason: HOSPADM

## 2024-12-19 RX ORDER — 0.9 % SODIUM CHLORIDE 0.9 %
1000 INTRAVENOUS SOLUTION INTRAVENOUS ONCE
Status: COMPLETED | OUTPATIENT
Start: 2024-12-19 | End: 2024-12-19

## 2024-12-19 RX ORDER — ISOSORBIDE DINITRATE 10 MG/1
20 TABLET ORAL 3 TIMES DAILY
Status: DISCONTINUED | OUTPATIENT
Start: 2024-12-19 | End: 2024-12-26 | Stop reason: HOSPADM

## 2024-12-19 RX ORDER — SODIUM CHLORIDE 0.9 % (FLUSH) 0.9 %
5-40 SYRINGE (ML) INJECTION EVERY 12 HOURS SCHEDULED
Status: DISCONTINUED | OUTPATIENT
Start: 2024-12-19 | End: 2024-12-20

## 2024-12-19 RX ORDER — ASPIRIN 81 MG/1
81 TABLET, CHEWABLE ORAL DAILY
Status: DISCONTINUED | OUTPATIENT
Start: 2024-12-19 | End: 2024-12-26 | Stop reason: HOSPADM

## 2024-12-19 RX ADMIN — HEPARIN SODIUM 5000 UNITS: 5000 INJECTION INTRAVENOUS; SUBCUTANEOUS at 22:16

## 2024-12-19 RX ADMIN — SODIUM CHLORIDE: 4.5 INJECTION, SOLUTION INTRAVENOUS at 16:46

## 2024-12-19 RX ADMIN — SODIUM CHLORIDE, PRESERVATIVE FREE 10 ML: 5 INJECTION INTRAVENOUS at 22:17

## 2024-12-19 RX ADMIN — HEPARIN SODIUM 5000 UNITS: 5000 INJECTION INTRAVENOUS; SUBCUTANEOUS at 16:45

## 2024-12-19 RX ADMIN — VALPROATE SODIUM 250 MG: 100 INJECTION, SOLUTION INTRAVENOUS at 23:25

## 2024-12-19 RX ADMIN — MAGNESIUM SULFATE HEPTAHYDRATE 1000 MG: 1 INJECTION, SOLUTION INTRAVENOUS at 16:46

## 2024-12-19 RX ADMIN — LORAZEPAM 1 MG: 2 INJECTION INTRAMUSCULAR; INTRAVENOUS at 16:51

## 2024-12-19 RX ADMIN — LORAZEPAM 2 MG: 2 INJECTION INTRAMUSCULAR; INTRAVENOUS at 10:39

## 2024-12-19 RX ADMIN — SODIUM CHLORIDE 1000 ML: 9 INJECTION, SOLUTION INTRAVENOUS at 12:40

## 2024-12-19 RX ADMIN — LEVETIRACETAM 750 MG: 100 INJECTION INTRAVENOUS at 22:16

## 2024-12-19 RX ADMIN — WATER 1000 MG: 1 INJECTION INTRAMUSCULAR; INTRAVENOUS; SUBCUTANEOUS at 16:45

## 2024-12-19 RX ADMIN — FOSPHENYTOIN SODIUM 1135 MG PE: 50 INJECTION, SOLUTION INTRAMUSCULAR; INTRAVENOUS at 19:09

## 2024-12-19 RX ADMIN — LEVETIRACETAM 1000 MG: 100 INJECTION INTRAVENOUS at 10:47

## 2024-12-19 ASSESSMENT — PAIN - FUNCTIONAL ASSESSMENT: PAIN_FUNCTIONAL_ASSESSMENT: ADULT NONVERBAL PAIN SCALE (NPVS)

## 2024-12-19 NOTE — ED NOTES
Witnessed seizure activity by this nurse. Pt had head turned and fixed gaze to the left, twitching muscle contractions and desatted.   PRN ativan given and Dr. Vazquez notified via perfect serve.

## 2024-12-19 NOTE — ED NOTES
Assumed care of pt. Pt resting in bed, withdraws from pain. Pt's mouth twisted to the left and twitching. Prior nurse contacted Hospitalist for concern of seizure activity. Hospitalist at the bedside witnessed the twitching.     No verbal response, no eye opening at this time. Wife at bedside. Seizure precautions in place.

## 2024-12-19 NOTE — PROGRESS NOTES
[facility]      Primary Care At Home - Home Visit      Name: Nehemias Burleson Jr.    Address: [address] [place] [address]    :  1945  MRN:  [id number]      ASSESSMENT:     Diagnosis Orders   1. Seizure disorder (HCC)        2. History of stroke        3. 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 Severe vascular dementia without behavioral disturbance, psychotic disturbance, mood disturbance, or anxiety (HCC)        5. Essential hypertension        6. Stage 3b chronic kidney disease (CKD) ([facility])              PLAN:    CVA/ WORSENING SEIZURES/ HEMIPLEGIA/ DEMENTIA: This problem has deteriorated. I encouraged his wife to consider hospitalization and the RS was summoned.    HYPERTENSION/ CKD: This problem is stable. Current treatment was continued as noted above.       SUBJECTIVE:    Chief Complaint:  Chief Complaint   Patient presents with    Other     HOME VISIT: CVA/ SEIZURE DISORDER       History of Present Illness:    Nehemias Burleson Jr. is a 79 y.o. male who is seen with his wife for an [facility] Primary Care At Home Home Visit. It would be physically and emotionally difficult to take the patient from the home to seek primary care services in the community. The patient is homebound as a result of very limited mobility due to multiple medical problems including a previous CVA.     Recently, his wife feels that his seizures are more frequent. His dose of Keppra was recently increased from 500 mg bid to 750 mg bid. His seizures have continued. His appetite has been poor and he is less alert.      The patient has a number of chronic medical problems as listed above.    The history is obtained from the patient, family and previous records and is felt to be satisfactory to establish an acceptable plan of care. Health status indicators were reviewed and there are no changes except as noted above. The past medical

## 2024-12-19 NOTE — H&P
(H) 6.4 - 8.2 g/dL    Albumin 3.4 (L) 3.5 - 5.0 g/dL    Globulin 5.4 (H) 2.0 - 4.0 g/dL    Albumin/Globulin Ratio 0.6 (L) 1.1 - 2.2           CT HEAD WO CONTRAST    Result Date: 12/19/2024  EXAM: CT HEAD WO CONTRAST INDICATION: Prior CVA, seizures for the past 2 weeks, ICH vs mass vs new CVA? COMPARISON: None. CONTRAST: None. TECHNIQUE: Unenhanced CT of the head was performed using 5 mm images. Brain and bone windows were generated. Coronal and sagittal reformats. CT dose reduction was achieved through use of a standardized protocol tailored for this examination and automatic exposure control for dose modulation.  FINDINGS: Generalized prominence of cerebral sulci and ventricles is significantly increased but stable.. Periventricular white matter low-density is severe and diffuse, also stable, with focal encephalomalacia in the right parieto-occipital cortex consistent with old ischemia. Subcortical white matter low-density and volume loss also prominent in the right parasagittal frontoparietal cortex.. There is no intracranial hemorrhage, extra-axial collection, or mass effect. The basilar cisterns are open. No CT evidence of acute infarct. The bone windows demonstrate no abnormalities. The visualized portions of the paranasal sinuses and mastoid air cells are clear.     No acute intracranial abnormality. Significant stable microvascular ischemic, old ischemic and other age-related change. Electronically signed by SIMA HARRY     _______________________________________________________________________    TOTAL TIME:  76 Minutes    Critical Care Provided     Minutes non procedure based    Signed: Yana Vazquez MD    Procedures: see electronic medical records for all procedures/Xrays and details which were not copied into this note but were reviewed prior to creation of Plan.

## 2024-12-19 NOTE — ED PROVIDER NOTES
[facility] EMERGENCY DEPT  EMERGENCY DEPARTMENT ENCOUNTER    Patient Name: Nehemias Burleson Jr.  MRN: [id number]  YOB: 1945  Provider: Yohan Luu MD  PCP: Desean Robin MD  Time/Date of evaluation: 10:01 AM EST on 12/19/24    History of Presenting Illness     Chief Complaint   Patient presents with    Referral - General     History Provided by: Patient's Wife and Patient's Family   History is limited by: Acuity of Condition, Mental Status Change, and Patient Nonverbal    HISTORY (Narrative):   Nehemias Burleson Jr. is a 79 y.o. male with a PMHX of hypercholesterolemia, GERD, hypertension, CVA, and seizure disorder  who presents to the emergency department (room 37) by EMS C/O worsening seizures despite increasing doses of his Keppra.  Patient was seen by his home-based primary care provider this morning who sent him to the emergency department for further evaluation.  Patient has been on Keppra 500 mg twice daily but was recently increased to 750 mg twice daily.  He is still continuing to have multiple seizures throughout the day.  His PCP is concerned he may have also had another stroke.    Nursing Notes were all reviewed and agreed with or any disagreements were addressed in the HPI.    Past History     PAST MEDICAL HISTORY:  Past Medical History:   Diagnosis Date    Cholesterolosis     Chronic kidney disease     GERD (gastroesophageal reflux disease)     Hypercholesterolemia     Hypertension     Kidney disease     Seizure (HCC)     Stroke (HCC)        PAST SURGICAL HISTORY:  No past surgical history on file.    FAMILY HISTORY:  No family history on file.    SOCIAL HISTORY:  Social History     Tobacco Use    Smoking status: Never   Substance Use Topics    Alcohol use: Not Currently       MEDICATIONS:  No current facility-administered medications on file prior to encounter.     Current Outpatient Medications on File Prior to Encounter   Medication Sig Dispense Refill    isosorbide dinitrate (ISORDIL) 20 MG  clinical findings. She is in agreement with care plans as outlined.  She agrees to admit the patient. [MS]      ED Course User Index  [MS] Yohan Luu MD     SEPSIS:  Is this patient to be included in the SEP-1 core measure? No Exclusion criteria - the patient is NOT to be included for SEP-1 Core Measure due to: Infection is not suspected    Clinical Management Tools:  Not Applicable    Patient was given the following medications:    Medications   LORazepam (ATIVAN) injection 2 mg (2 mg IntraVENous Given 12/19/24 1039)   levETIRAcetam (KEPPRA) injection 1,000 mg (1,000 mg IntraVENous Given 12/19/24 1047)   sodium chloride 0.9 % bolus 1,000 mL (1,000 mLs IntraVENous New Bag 12/19/24 1240)     CONSULTS:    IP CONSULT TO TELE-NEUROLOGY  IP CONSULT TO HOSPITALIST    Social Determinants affecting Diagnosis/Treatment: None    DISCUSSION:  This appears to be a severe acute worsening of a chronic condition.   79 y.o. male presents with worsening breakthrough seizures despite compliance with his medication.  He also has had decreased oral intake and increased lethargy per his wife.  He was sent in by his PCP to rule out stroke as well as to assess for a new brain mass/intracranial hemorrhage.  Patient had 2 witnessed seizures while I was in the room that did improve with IV Ativan.  He was loaded with IV Keppra.  CBC unremarkable, CMP consistent with acute on chronic renal insufficiency as well as a mild hypernatremia.  CT head negative for acute pathology.  Given his worsening seizures, will admit to the hospitalist and have neurology make recommendations as far as a second antiepileptic drug. The decision to perform testing and results were discussed with the patient and family. I discussed each of these tests and considerations with the patient and family. Patient's wife and family agree with the plan of admission.    ADDITIONAL CONSIDERATIONS:  None  Procedures/Critical Care     EKG 12 Lead    Date/Time:

## 2024-12-19 NOTE — TELEPHONE ENCOUNTER
Telephone call returned to wife Lillian, she is the ER with patient.  Advised Lillian that Dr. Robin cannot prescribe for patient in the hospital but that the reason he was sent to ER is because Dr. Robin witnessed him have a grand mal seizure at the time of his visit this AM.  Nurse was in room and could hear our conversation.  No additional info needed at present from [facility] team.

## 2024-12-19 NOTE — TELEPHONE ENCOUNTER
Lillian Benjy called to update that the patient is at the hospital.  She states that the hospital does not know why he was transferred to them.  Lillian said that Dr. Robin was supposed to tell the hospital what tests needed to be done for the patient, but hospital staff says they do not have any information.  Lililan asks for a callback at [phone]

## 2024-12-19 NOTE — CONSULTS
Neurology/Neurohospitalist consult note      Patient identification    Name: Nehemias Burleson Jr.   : 1945    Gender: male   MRN: [id number]  Room/bed  : [place] [address]    Admission date:  2024      Attending provider: Omari Yanez MD  Admitting provider: Omari Yanez MD       ---------------------------------------------        Principal hospital problem: Seizure (HCC)      Reason for consultation: Breakthrough seizures      History of presenting illness :    Nehemias Burleson Jr. is a 79 y.o.  male  patient With past medical history significant for dementia, chronic right hemispheric infarct, dependent on ADLs at baseline.. Additional past medical/surgical history included below.  Historical information per review of the EMR.  No family at bedside.  Patient reportedly has been having increasing frequency of seizures in the last few days, and his regular neurologist increased her Keppra dose from 500 twice a day to 750 mg twice a day 2 days ago due to increase seizure frequency, which reportedly is going on on a daily basis, per history obtained by Dr. Vazquez from family.          Current hospital medications   aspirin  81 mg Oral Daily    atorvastatin  40 mg Oral Daily    cyanocobalamin  1,000 mcg Oral Daily    [START ON 2024] ferrous sulfate  325 mg Oral QAM AC    folic acid  1,000 mcg Oral Daily    isosorbide dinitrate  20 mg Oral TID    sodium chloride flush  5-40 mL IntraVENous 2 times per day    cefTRIAXone (ROCEPHIN) IV  1,000 mg IntraVENous Q24H    levETIRAcetam  750 mg IntraVENous Q12H    fosphenytoin (CEREBYX) 1,135 mg PE in sodium chloride 0.9 % 100 mL IVPB (loading dose)  20 mg PE/kg IntraVENous Once    [START ON 2024] valproate (DEPACON) 250 mg in sodium chloride 0.9 % 50 mL infusion  250 mg IntraVENous Q6H    sodium chloride flush  5-40 mL IntraVENous 2 times per day    heparin (porcine)  5,000 Units SubCUTAneous 3 times per day          Allergies  No Known Allergies    Past medical  associated with left gaze fixed deviation, and intermittent mild tremulousness in the left upper and lower extremities.  Aborted spontaneously.     Ordered a loading dose of stat IV fosphenytoin 20 mg/kg.  Monitor blood pressure closely.    For maintenance therapy, recommend IV Depacon, 250 mg every 6 hours starting tonight.  Continue home medication, Keppra 750 mg twice a day.  He was given Keppra 1 g earlier in the ER at presentation.  PRN Ativan doses were also given for witnessed clinical seizures.    (Due to prolonged HI interval, defer Vimpat.  GFR 27, on Keppra 750 twice a day.  Defer  increasing the dose due to renal failure).    Will be started on LTM EEG monitoring.  Patient was admitted to the ICU for close monitoring.  CT head negative for any acute intracranial pathology.  Chronic right hemispheric infarct with encephalomalacia is noted, which is likely the epileptogenic focus, associated with the left gaze deviation and left-sided convulsive activity.     Defer evaluation  and  management of  infections and metabolic abnormalities, as indicated, to the primary team.    Discussed with primary hospitalist physician, Dr. Vazquez.       Discussed with patient's nurse.    Please call if any further questions.     Will follow       ___________________________________________________________________      Disclaimer: This note was prepared with voice recognition and transcription software and thus may contain typographical and grammatical errors. While efforts were made to correct any mistakes made by this voice recognition program, some errors and/or omissions may remain in the note that were missed when the note was originally created.        ___________________________________________________________________

## 2024-12-19 NOTE — ED TRIAGE NOTES
Pt BIBA from home, per EMS pt had a home visit by an MD and was told to come to ER for \"testing\" per pts family. Pt has hx of stroke and dementia pt non verbal in triage. Per EMS this is pts baseline.

## 2024-12-20 ENCOUNTER — APPOINTMENT (OUTPATIENT)
Facility: HOSPITAL | Age: 79
DRG: 100 | End: 2024-12-20
Payer: MEDICARE

## 2024-12-20 PROBLEM — E43 SEVERE PROTEIN-CALORIE MALNUTRITION (HCC): Status: ACTIVE | Noted: 2024-12-20

## 2024-12-20 LAB
ANION GAP SERPL CALC-SCNC: 3 MMOL/L (ref 2–12)
ANION GAP SERPL CALC-SCNC: 6 MMOL/L (ref 2–12)
ANION GAP SERPL CALC-SCNC: 6 MMOL/L (ref 2–12)
APPEARANCE UR: CLEAR
BACTERIA URNS QL MICRO: NEGATIVE /HPF
BASOPHILS # BLD: 0.1 K/UL (ref 0–0.1)
BASOPHILS NFR BLD: 1 % (ref 0–1)
BILIRUB UR QL: NEGATIVE
BUN SERPL-MCNC: 35 MG/DL (ref 6–20)
BUN SERPL-MCNC: 41 MG/DL (ref 6–20)
BUN SERPL-MCNC: 41 MG/DL (ref 6–20)
BUN/CREAT SERPL: 19 (ref 12–20)
BUN/CREAT SERPL: 20 (ref 12–20)
BUN/CREAT SERPL: 21 (ref 12–20)
CALCIUM SERPL-MCNC: 8.6 MG/DL (ref 8.5–10.1)
CALCIUM SERPL-MCNC: 8.8 MG/DL (ref 8.5–10.1)
CALCIUM SERPL-MCNC: 8.9 MG/DL (ref 8.5–10.1)
CHLORIDE SERPL-SCNC: 119 MMOL/L (ref 97–108)
CHLORIDE SERPL-SCNC: 120 MMOL/L (ref 97–108)
CHLORIDE SERPL-SCNC: 123 MMOL/L (ref 97–108)
CO2 SERPL-SCNC: 21 MMOL/L (ref 21–32)
CO2 SERPL-SCNC: 22 MMOL/L (ref 21–32)
CO2 SERPL-SCNC: 23 MMOL/L (ref 21–32)
COLOR UR: ABNORMAL
CREAT SERPL-MCNC: 1.86 MG/DL (ref 0.7–1.3)
CREAT SERPL-MCNC: 1.99 MG/DL (ref 0.7–1.3)
CREAT SERPL-MCNC: 2.02 MG/DL (ref 0.7–1.3)
DIFFERENTIAL METHOD BLD: ABNORMAL
EOSINOPHIL # BLD: 0.1 K/UL (ref 0–0.4)
EOSINOPHIL NFR BLD: 1 % (ref 0–7)
EPITH CASTS URNS QL MICRO: ABNORMAL /LPF
ERYTHROCYTE [DISTWIDTH] IN BLOOD BY AUTOMATED COUNT: 14 % (ref 11.5–14.5)
GLUCOSE SERPL-MCNC: 108 MG/DL (ref 65–100)
GLUCOSE SERPL-MCNC: 117 MG/DL (ref 65–100)
GLUCOSE SERPL-MCNC: 173 MG/DL (ref 65–100)
GLUCOSE UR STRIP.AUTO-MCNC: NEGATIVE MG/DL
HCT VFR BLD AUTO: 35.3 % (ref 36.6–50.3)
HGB BLD-MCNC: 10.8 G/DL (ref 12.1–17)
HGB UR QL STRIP: NEGATIVE
HYALINE CASTS URNS QL MICRO: ABNORMAL /LPF (ref 0–2)
IMM GRANULOCYTES # BLD AUTO: 0 K/UL (ref 0–0.04)
IMM GRANULOCYTES NFR BLD AUTO: 0 % (ref 0–0.5)
KETONES UR QL STRIP.AUTO: ABNORMAL MG/DL
LACTATE SERPL-SCNC: 1.5 MMOL/L (ref 0.4–2)
LEUKOCYTE ESTERASE UR QL STRIP.AUTO: ABNORMAL
LYMPHOCYTES # BLD: 2.6 K/UL (ref 0.8–3.5)
LYMPHOCYTES NFR BLD: 23 % (ref 12–49)
MAGNESIUM SERPL-MCNC: 2.4 MG/DL (ref 1.6–2.4)
MAGNESIUM SERPL-MCNC: 2.7 MG/DL (ref 1.6–2.4)
MCH RBC QN AUTO: 30.4 PG (ref 26–34)
MCHC RBC AUTO-ENTMCNC: 30.6 G/DL (ref 30–36.5)
MCV RBC AUTO: 99.4 FL (ref 80–99)
MONOCYTES # BLD: 0.6 K/UL (ref 0–1)
MONOCYTES NFR BLD: 5 % (ref 5–13)
NEUTS SEG # BLD: 7.8 K/UL (ref 1.8–8)
NEUTS SEG NFR BLD: 70 % (ref 32–75)
NITRITE UR QL STRIP.AUTO: NEGATIVE
NRBC # BLD: 0 K/UL (ref 0–0.01)
NRBC BLD-RTO: 0 PER 100 WBC
PH UR STRIP: 5 (ref 5–8)
PHOSPHATE SERPL-MCNC: 4.1 MG/DL (ref 2.6–4.7)
PHOSPHATE SERPL-MCNC: 4.4 MG/DL (ref 2.6–4.7)
PLATELET # BLD AUTO: 267 K/UL (ref 150–400)
PMV BLD AUTO: 12 FL (ref 8.9–12.9)
POTASSIUM SERPL-SCNC: 4 MMOL/L (ref 3.5–5.1)
POTASSIUM SERPL-SCNC: 4.1 MMOL/L (ref 3.5–5.1)
POTASSIUM SERPL-SCNC: 4.6 MMOL/L (ref 3.5–5.1)
PROCALCITONIN SERPL-MCNC: 0.16 NG/ML
PROT UR STRIP-MCNC: 100 MG/DL
RBC # BLD AUTO: 3.55 M/UL (ref 4.1–5.7)
RBC #/AREA URNS HPF: ABNORMAL /HPF (ref 0–5)
SODIUM SERPL-SCNC: 145 MMOL/L (ref 136–145)
SODIUM SERPL-SCNC: 148 MMOL/L (ref 136–145)
SODIUM SERPL-SCNC: 150 MMOL/L (ref 136–145)
SP GR UR REFRACTOMETRY: 1.02
URINE CULTURE IF INDICATED: ABNORMAL
UROBILINOGEN UR QL STRIP.AUTO: 0.2 EU/DL (ref 0.2–1)
WBC # BLD AUTO: 11.3 K/UL (ref 4.1–11.1)
WBC URNS QL MICRO: ABNORMAL /HPF (ref 0–4)

## 2024-12-20 PROCEDURE — 95714 VEEG EA 12-26 HR UNMNTR: CPT

## 2024-12-20 PROCEDURE — 2500000003 HC RX 250 WO HCPCS: Performed by: PSYCHIATRY & NEUROLOGY

## 2024-12-20 PROCEDURE — 81001 URINALYSIS AUTO W/SCOPE: CPT

## 2024-12-20 PROCEDURE — 84100 ASSAY OF PHOSPHORUS: CPT

## 2024-12-20 PROCEDURE — 6370000000 HC RX 637 (ALT 250 FOR IP): Performed by: INTERNAL MEDICINE

## 2024-12-20 PROCEDURE — 87186 SC STD MICRODIL/AGAR DIL: CPT

## 2024-12-20 PROCEDURE — 2580000003 HC RX 258: Performed by: NURSE PRACTITIONER

## 2024-12-20 PROCEDURE — 2580000003 HC RX 258: Performed by: PSYCHIATRY & NEUROLOGY

## 2024-12-20 PROCEDURE — 74018 RADEX ABDOMEN 1 VIEW: CPT

## 2024-12-20 PROCEDURE — 6360000002 HC RX W HCPCS: Performed by: NURSE PRACTITIONER

## 2024-12-20 PROCEDURE — 2580000003 HC RX 258: Performed by: HOSPITALIST

## 2024-12-20 PROCEDURE — 2500000003 HC RX 250 WO HCPCS: Performed by: HOSPITALIST

## 2024-12-20 PROCEDURE — 36415 COLL VENOUS BLD VENIPUNCTURE: CPT

## 2024-12-20 PROCEDURE — 6370000000 HC RX 637 (ALT 250 FOR IP): Performed by: HOSPITALIST

## 2024-12-20 PROCEDURE — 95720 EEG PHY/QHP EA INCR W/VEEG: CPT | Performed by: PSYCHIATRY & NEUROLOGY

## 2024-12-20 PROCEDURE — 2000000000 HC ICU R&B

## 2024-12-20 PROCEDURE — 6360000002 HC RX W HCPCS: Performed by: INTERNAL MEDICINE

## 2024-12-20 PROCEDURE — 87088 URINE BACTERIA CULTURE: CPT

## 2024-12-20 PROCEDURE — 85025 COMPLETE CBC W/AUTO DIFF WBC: CPT

## 2024-12-20 PROCEDURE — 80048 BASIC METABOLIC PNL TOTAL CA: CPT

## 2024-12-20 PROCEDURE — 2580000003 HC RX 258: Performed by: INTERNAL MEDICINE

## 2024-12-20 PROCEDURE — 6360000002 HC RX W HCPCS: Performed by: HOSPITALIST

## 2024-12-20 PROCEDURE — 2500000003 HC RX 250 WO HCPCS: Performed by: INTERNAL MEDICINE

## 2024-12-20 PROCEDURE — 87086 URINE CULTURE/COLONY COUNT: CPT

## 2024-12-20 PROCEDURE — 83735 ASSAY OF MAGNESIUM: CPT

## 2024-12-20 RX ORDER — FERROUS SULFATE 300 MG/5ML
300 LIQUID (ML) ORAL DAILY
Status: DISCONTINUED | OUTPATIENT
Start: 2024-12-20 | End: 2024-12-26 | Stop reason: HOSPADM

## 2024-12-20 RX ORDER — METOCLOPRAMIDE HYDROCHLORIDE 5 MG/ML
5 INJECTION INTRAMUSCULAR; INTRAVENOUS EVERY 6 HOURS
Status: DISCONTINUED | OUTPATIENT
Start: 2024-12-20 | End: 2024-12-21

## 2024-12-20 RX ORDER — DEXTROSE MONOHYDRATE 50 MG/ML
INJECTION, SOLUTION INTRAVENOUS CONTINUOUS
Status: DISCONTINUED | OUTPATIENT
Start: 2024-12-20 | End: 2024-12-20

## 2024-12-20 RX ORDER — GAUZE BANDAGE 2" X 2"
100 BANDAGE TOPICAL DAILY
Status: DISCONTINUED | OUTPATIENT
Start: 2024-12-25 | End: 2024-12-26 | Stop reason: HOSPADM

## 2024-12-20 RX ORDER — GAUZE BANDAGE 2" X 2"
200 BANDAGE TOPICAL DAILY
Status: DISPENSED | OUTPATIENT
Start: 2024-12-20 | End: 2024-12-25

## 2024-12-20 RX ORDER — METOCLOPRAMIDE HYDROCHLORIDE 5 MG/ML
10 INJECTION INTRAMUSCULAR; INTRAVENOUS EVERY 6 HOURS
Status: DISCONTINUED | OUTPATIENT
Start: 2024-12-20 | End: 2024-12-20

## 2024-12-20 RX ADMIN — WATER 1000 MG: 1 INJECTION INTRAMUSCULAR; INTRAVENOUS; SUBCUTANEOUS at 16:09

## 2024-12-20 RX ADMIN — LEVETIRACETAM 750 MG: 100 INJECTION INTRAVENOUS at 13:14

## 2024-12-20 RX ADMIN — CYANOCOBALAMIN TAB 500 MCG 1000 MCG: 500 TAB at 09:57

## 2024-12-20 RX ADMIN — Medication 1 AMPULE: at 09:58

## 2024-12-20 RX ADMIN — MINERAL SUPPLEMENT IRON 300 MG / 5 ML STRENGTH LIQUID 100 PER BOX UNFLAVORED 300 MG: at 14:20

## 2024-12-20 RX ADMIN — VALPROATE SODIUM 250 MG: 100 INJECTION, SOLUTION INTRAVENOUS at 13:22

## 2024-12-20 RX ADMIN — VALPROATE SODIUM 250 MG: 100 INJECTION, SOLUTION INTRAVENOUS at 23:53

## 2024-12-20 RX ADMIN — HEPARIN SODIUM 5000 UNITS: 5000 INJECTION INTRAVENOUS; SUBCUTANEOUS at 05:36

## 2024-12-20 RX ADMIN — VALPROATE SODIUM 250 MG: 100 INJECTION, SOLUTION INTRAVENOUS at 05:36

## 2024-12-20 RX ADMIN — METOCLOPRAMIDE 5 MG: 5 INJECTION, SOLUTION INTRAMUSCULAR; INTRAVENOUS at 20:04

## 2024-12-20 RX ADMIN — ISOSORBIDE DINITRATE 20 MG: 10 TABLET ORAL at 20:08

## 2024-12-20 RX ADMIN — SODIUM CHLORIDE, PRESERVATIVE FREE 10 ML: 5 INJECTION INTRAVENOUS at 20:01

## 2024-12-20 RX ADMIN — HEPARIN SODIUM 5000 UNITS: 5000 INJECTION INTRAVENOUS; SUBCUTANEOUS at 14:44

## 2024-12-20 RX ADMIN — SODIUM CHLORIDE: 4.5 INJECTION, SOLUTION INTRAVENOUS at 04:43

## 2024-12-20 RX ADMIN — THIAMINE HCL TAB 100 MG 200 MG: 100 TAB at 14:44

## 2024-12-20 RX ADMIN — HYDRALAZINE HYDROCHLORIDE 10 MG: 20 INJECTION INTRAMUSCULAR; INTRAVENOUS at 00:06

## 2024-12-20 RX ADMIN — Medication 1 AMPULE: at 20:07

## 2024-12-20 RX ADMIN — HEPARIN SODIUM 5000 UNITS: 5000 INJECTION INTRAVENOUS; SUBCUTANEOUS at 22:10

## 2024-12-20 RX ADMIN — ISOSORBIDE DINITRATE 20 MG: 10 TABLET ORAL at 14:20

## 2024-12-20 RX ADMIN — VALPROATE SODIUM 250 MG: 100 INJECTION, SOLUTION INTRAVENOUS at 18:13

## 2024-12-20 RX ADMIN — METOCLOPRAMIDE 10 MG: 5 INJECTION, SOLUTION INTRAMUSCULAR; INTRAVENOUS at 09:57

## 2024-12-20 RX ADMIN — DEXTROSE MONOHYDRATE: 50 INJECTION, SOLUTION INTRAVENOUS at 16:56

## 2024-12-20 RX ADMIN — METOCLOPRAMIDE 10 MG: 5 INJECTION, SOLUTION INTRAMUSCULAR; INTRAVENOUS at 14:44

## 2024-12-20 RX ADMIN — DEXTROSE MONOHYDRATE: 50 INJECTION, SOLUTION INTRAVENOUS at 13:21

## 2024-12-20 RX ADMIN — LEVETIRACETAM 750 MG: 100 INJECTION INTRAVENOUS at 22:42

## 2024-12-20 RX ADMIN — FOLIC ACID 1000 MCG: 1 TABLET ORAL at 09:57

## 2024-12-20 RX ADMIN — ATORVASTATIN CALCIUM 40 MG: 40 TABLET, FILM COATED ORAL at 09:57

## 2024-12-20 RX ADMIN — PANTOPRAZOLE SODIUM 40 MG: 40 INJECTION, POWDER, FOR SOLUTION INTRAVENOUS at 09:58

## 2024-12-20 RX ADMIN — ASPIRIN 81 MG: 81 TABLET, CHEWABLE ORAL at 09:57

## 2024-12-20 RX ADMIN — SODIUM CHLORIDE, PRESERVATIVE FREE 10 ML: 5 INJECTION INTRAVENOUS at 09:59

## 2024-12-20 NOTE — CONSULTS
CRITICAL CARE NOTE    Name: Nehemias Burleson Jr.   : 1945   MRN: [id number]   Date: 2024      REASON FOR ICU ADMISSION:  Breakthrough seizures     PRINCIPAL ICU DIAGNOSIS     Seizure disorder c/b breakthrough seizures  Vascular dementia  Acute on chronic deconditioning  PAXTON on CKD3    BRIEF PATIENT SUMMARY     Nehemias Burleson Jr is an 78 yo male with a PMH of vascular dementia, CKD3, chronic R hemispheric infarct, seizures on keppra, bedbound status at baseline who had recently been experiencing increased seizure frequency, prompting his primary neurologist to increase baseline keppra dose from 500mg BID to 750mg BID. He continued to have breakthrough seizures as many as 5-6x/day for the last few days.  He was seen by his home visit physician on  who recommended he go to the ED. In the ED, he had two witnessed seizures requiring ativan. He was loaded with fosphenytoin and was started on depakote and remains on keppra.    He was transferred to ICU and started on cEEG. He is protecting his airway with a strong cough and gag. Infectious workup ongoing. He was started on empiric ceftriaxone.    Current Outpatient Medications on File Prior to Visit   Medication Sig Dispense Refill    isosorbide dinitrate (ISORDIL) 20 MG tablet Take 1 tablet by mouth 3 times daily (To be crushed) 270 tablet 3    levETIRAcetam (KEPPRA) 100 MG/ML oral solution GIVE 5 ML BY MOUTH TWO TIMES DAILY 473 mL 2    atorvastatin (LIPITOR) 40 MG tablet TAKE 1 TABLET BY MOUTH EVERY DAY 90 tablet 1    ferrous sulfate (IRON 325) 325 (65 Fe) MG tablet TAKE 1 TABLET BY MOUTH EVERY DAY BEFORE BREAKFAST 90 tablet 1    folic acid (FOLVITE) 1 MG tablet TAKE 1 TABLET BY MOUTH EVERY DAY 90 tablet 1    cyanocobalamin 1000 MCG tablet Take 1 tablet by mouth daily 90 tablet 1    aspirin 81 MG chewable tablet Take 1 tablet by mouth daily        pantoprazole sodium (PROTONIX) 40 MG PACK packet Take 1 packet by mouth daily          COMPREHENSIVE

## 2024-12-20 NOTE — PROGRESS NOTES
Neurology/Neurohospitalist Follow-up progress note    Patient identification    Name: Nehemias Burleson Jr.   : 1945    Gender: male   MRN: [id number]  Room/bed  : [address] [place] [address]    Admission date:  2024      Attending provider: Omari Yanez MD  Admitting provider: Omari Yanez MD     ---------------------------------------------------------------------------    Chief complaint:   Chief Complaint   Patient presents with    Referral - [facility] problem: Seizure (HCC)      Reason for Follow Up: seizures    Pertinent neurological interval history, review of neurodiagnostic results and neurological care    Patient presented to the ER yesterday, with seizures.  Neurological history, presentation described in detail in the prior consult note from yesterday.      Patient seen in ICU.  He is drowsy, minimal eye-opening to tactile stimulus, does not follow any verbal commands, nonverbal.  Rigidity noted in all 4 extremities.  No voluntary motor movement noted.  Minimal withdrawal to tactile stimulus in upper extremities.  No involuntary movements or gaze deviation noted, mid gaze.    LTM EEG overnight, negative for any further seizures.  Nonspecific encephalopathy changes, with focal slowing in the right side, suggestive of neuronal dysfunction noted.    Was given a loading dose of IV fosphenytoin yesterday, due to frequent clinical seizures.  Started on maintenance dose of IV Depacon 250 mg every 6 hours, has been on Keppra 750 twice a day at home which has been continued.  Continue these medications for now    CT head images on my review, negative for any acute intracranial pathology.  Chronic right hemispheric infarct with encephalomalacia is noted, which is likely the epileptogenic focus, associated with the left gaze deviation and left-sided convulsive activity.       Defer evaluation  and  management of  infections and metabolic abnormalities, as indicated, to the primary team.    Discussed

## 2024-12-20 NOTE — PROCEDURES
[facility]              [address] [place] [address]                                   EEG      PATIENT NAME: SHIV CARMEN                 : 1945  MED REC NO: [id number]                       ROOM: 2523  ACCOUNT NO: [id number]                       ADMIT DATE: 2024  PROVIDER: Minh Martinez MD    DATE OF SERVICE:  2024    DATE OF STUDY:  2024 to 2024.    CLINICAL INDICATION:  The patient is an 79-year-old male with a history of left-sided facial twitches and body twitches, EEG to rule out focal seizures, rule out epilepsy.    EEG CLASSIFICATION:  On this patient is dysrhythmia grade 2, generalized maximum right hemisphere and maximum right temple.    DESCRIPTION OF THE RECORD:  This is a 24-hour prolonged video EEG recording on the patient to record possible seizures.  This study began on 2024 at 5:11 pm and ended on 2024 at approximately 7:45 a.m. for a total time of study of 14 hours and 34 minutes.  During this time, the patient had one episode of some slight muscle activity seen in the left side with dysrhythmia or spike discharges seen in the right hemisphere..  There were no spike or spike-and-wave discharges recorded on the EEG.  For the most of the rest of the recording, there is just some mild generalized slowing with the patient in a somewhat sedated and sleep state with the slowing being slightly more prominent in the right hemisphere, maximum in right temple area.  There were no spike or spike-and-wave discharges, and no recorded electrographic spells of any type seen.  The patient again slept through most of the recording with some K-complexes and sleep spindles seen in the central head regions.  Neither photic stimulation nor hyperventilation was performed.  On the video monitor, the patient seemed to have no clear clinical seizures either, but through a large portion of the recording, the camera had been

## 2024-12-20 NOTE — PROCEDURES
PROCEDURE NOTE  Date: 12/19/2024   Name: Nehemias Burleson Jr.  YOB: 1945    Procedures        Test Date: 12/19/2024    History: Patient is a 79-year-old with altered mental status and possible focal seizures with left-sided twitching, for rapid EEG to evaluate for seizures.    Medications: Keppra and see chart    Patient consent: Correct patient identified    Description of procedure: This EEG was obtained using a 10 lead, 8 channel system positioned circumferentially without any parasagittal coverage (rapid EEG). Computer selected EEG is reviewed as well as background features and all clinically significant events. Clarity algorithm with NTAp was utilized and implemented to provide analysis of underlying activity and seizure detection used to facilitate reading.    Description of recording: This is a rapid EEG on the patient to evaluate for possible seizures, the patient having left-sided twitching, and altered mental status, and this study began at 10:36 AM on 12/19/2024 and ended on 12:37 PM on 12/19/2024 for total time of study of 2 hours and 1 minute.  During this time the patient had some intermittent spike and spike and wave discharges seen occasionally lasting for about 1 second to a minute or 2 beginning occasionally in the right hemisphere and then spreading to a generalized fashion into both hemispheres consistent with a generalized seizure.  Sometimes it would just some muscle activity seen on the left side suggesting a seizure activity but no clear right-sided spike discharges were seen suggesting possibly a subcortical focus.  The patient also had moderate generalized slow activity seen throughout the recording and it did appear at times to be in a state of sleep with K complexes and sleep spindles seen in the central head regions.  Hyperventilation was not performed.  40 stimulation was not performed..    Impression: This is an abnormal EEG showing generalized spike and wave discharges and  occasionally spike and wave discharges beginning in the right hemisphere spreading to the left, and even some runs of repetitive muscle activity on the left side but no clear spike discharges on the right side suggesting there may be even a subcortical focus in addition.  There was also moderate generalized slowing seen most consistent with a diffuse toxic, metabolic or degenerative encephalopathy.  If clinically indicated repeat studies with a standard 16 channel EEG recording may be of further diagnostic benefit, because there was considerable movement muscle electrode artifact seen throughout this recording.    Comment: A normal EEG does not rule out the diagnosis of a seizure disorder; clinical  correlation is advised.  If there is still persistent suspicion for continued seizure-like  activity, would advise obtaining an electroencephalogram with the 10-20 international  system for improved spatial resolution and parasagittal coverage.

## 2024-12-20 NOTE — PROGRESS NOTES
tablet Take 1 tablet by mouth daily        pantoprazole sodium (PROTONIX) 40 MG PACK packet Take 1 packet by mouth daily          COMPREHENSIVE ASSESSMENT & PLAN:SYSTEM BASED     24 HOUR EVENTS: As above    NEUROLOGICAL:     Seizure disorder c/b breakthrough seizures  - Neurology following  - cEEG  - Keppra  - Received fosphenytoin for status  - Continue depakote 250 q6h  - CT head non-con negative  - Infectious workup ongoing; ordered u/a, blood cultures    Vascular dementia c/b acute on chronic deconditioning  - Palli consulted  - per family, patient has very limited quality of life at baseline and he has been declining significantly and failing to thrive in last one month.     PULMONOLOGY:     CXR ordered    CARDIOVASCULAR:     MAP goal >65; not requiring pressors    GASTROINTESTINAL      Will likely need PEG placement for long term feeding. NG for now. Monitor for refeeding.    Severe protein calorie malnutrition with cachexia and hypoalbuminemia      GERD: PPI    RENAL/ELECTROLYTE/FLUIDS:     Hypernatremia: Likely from decreased PO intake  D5 W infusion x 1 L.   Start free water once tube feeds are started    ENDOCRINE:     Maintain euglycemia    HEMATOLOGY/ONCOLOGY:     Daily CBC    INFECTIOUS DISEASE:     ANTIBIOTICS TO DATE:  Ceftriaxone    CULTURES TO DATE:      ICU DAILY CHECKLIST     Code Status:F  DVT Prophylaxis:SC heparin  T/L/D: PIV  Lines:PIV  Drains: N  SUP: N  Diet: ADAT  Activity Level:As zainab  ABCDEF Bundle/Checklist Completed:Yes  Disposition: ICU  Multidisciplinary Rounds Completed: Pending  Goals of Care Discussion/Palliative:Pending  Patient/Family Updated: Pending       Peripheral Intravenous Line:    Peripheral IV 12/19/24 Right;Anterior Forearm (Active)          HOSPITAL COURSE/DAILY EVENT LOG     As above    SUBJECTIVE   Review of Systems     As above    OBJECTIVE   Physical Exam  Vitals and nursing note reviewed.   Constitutional:       Appearance: He is ill-appearing.   HENT:      Head:

## 2024-12-20 NOTE — CONSULTS
Palliative Medicine  Patient Name: Nehemias Burleson Jr.  YOB: 1945  MRN: [id number]  Age: 79 y.o.  Gender: male  Received consult  Mr Burleson is followed very closely by our sister program - [facility].  They see him monthly with many phone calls between  He has been bedbound and non verbal the entire time they have been following him (since Feb of 2023) so the visits include education on the progression of his disease processes    The inpatient setting is not a great place to have goals of care conversations when their PCP is doing it in the community already- so will defer this consult at this time    If he decompensates, and his ability to survive this hospitalization comes into question, feel free to reconsult and we can readdress goals with family at that time, otherwise will defer to his primary care team to continue their conversations with the family    THANH Scott - NICOLE

## 2024-12-20 NOTE — PROCEDURES
[facility] [place]              [address] [id number]                                   EEG      PATIENT NAME: SHIV CARMEN                 : 1945  MED REC NO: [id number]                       ROOM: 2523  ACCOUNT NO: [id number]                       ADMIT DATE: 2024  PROVIDER: Minh Martinez MD    DATE OF SERVICE:  2024    CLINICAL INDICATION:  The patient is an 79-year-old male with a history of recurring left-sided myoclonic jerks and twitching, and EEG is to rule out seizures.    EEG CLASSIFICATION:    1. Possibly dysrhythmia, grade 3, right hemisphere.    2. Dysrhythmia, grade 2, generalized maximum right hemisphere    DESCRIPTION OF THE RECORD:  This is a 16-channel EEG recording on the patient for possible seizures.  The patient with left-sided twitching.  This EEG shows runs of mainly muscle activity on the left side when the patient was described as having left facial and eye and arm twitches.  During the time of the twitch, there was rarely much activity seen in the right hemisphere until the end of the twitching episodes where there appeared to be some dysrhythmic runs of 2-6 hertz theta activity in the central parietal region, but no clear spike discharges were seen.  The patient had this happen repeatedly throughout the recording, probably about every 3 or 5 minutes in the recording.  There is a moderate generalized increase in 2-6 hertz theta activity throughout the whole recording seen, but is at times fairly more prominent in the right hemisphere.  Stimulation of the patient did not appear to change the recording much.  Again during the times of the facial twitching and muscle activity seen on the left side, there really were no clear spike or spike-and-wave discharges seen, but the patient did have dysrhythmic runs of theta activity near the lateral portions of these dysrhythmic runs of muscle activity.  As these resolved, the  patient did have increased amplitude of activity coming from the right hemisphere and generalized compared to the left hemisphere.  Hyperventilation was not performed.  Photic stimulation was not performed. This is a somewhat unusual EEG recording showing periodic runs of muscle activity during the time the patient had fascial and eye twitches on the left side that were described as a focal seizure, but at this time, the patient really did not have any true spike or spike-and-wave discharges coming from the right hemisphere, but did get runs of dysrhythmic activity towards the end of these discharges and some of these discharges causing the seizure activity on the left side may be subcortical.  The patient was frequently in state where there appeared to be sleep with K complexes and sleep spindles seen in central head regions.  Stimulation of the patient did not appear to change the recording much.  Hyperventilation was not performed.  Photic stimulation was not performed.     Interpretation: This is a moderately abnormal esophagram due to the generalized slowing seen and repeated episodes of muscle activity seen on the left side, was not consistent spike discharges from the right hemisphere indicating a possible subcortical focus.  At times near the end of the abnormal left-sided muscle movement there was some dysrhythmic activity coming from the right hemisphere.  There was a moderate generalized slowing activity seen throughout the recording but is at times being slightly more prominent in the right hemisphere maximum right temporal area.  Due to the frequent episodes, repeat studies certainly would be considered possibly of diagnostic benefit and even continuous monitoring may be somewhat better diagnostic benefit.  Clinical correlation recommended.        MINH MARTINEZ MD      TAS/AQS  D:  12/19/2024 18:28:59  T:  12/19/2024 19:55:08  JOB #:  [id number]    CC:   Minh Martinez MD

## 2024-12-20 NOTE — INTERDISCIPLINARY ROUNDS
Critical care interdisciplinary rounds today.  Following members present: Case Management, , Nursing, Nutrition, Pharmacy, and Physician. Family invited to participate. Wound care consulted for POA wound.   Plan of care discussed.  See clinical pathway for plan of care and interventions and desired outcomes.

## 2024-12-20 NOTE — PROGRESS NOTES
Comprehensive Nutrition Assessment    Type and Reason for Visit:  Initial, Consult    Nutrition Recommendations/Plan:   Initiate TF via NGT: Jevity 1.5 @ 10mL/h, advance rate 5mL q 12h as tolerated to Goal of 45mL/h + 200mL flush q 4h (provides 1620kcals/68gPro/232gCHO/2020mL)   HIGH refeeding risk, monitor lytes closely and replete prn  Start 200mg thiamine for 5 days      Malnutrition Assessment:  Malnutrition Status:  Severe malnutrition (12/20/24 1418)    Context:  Chronic Illness     Findings of the 6 clinical characteristics of malnutrition:  Energy Intake:  Unable to assess  Weight Loss:  Unable to assess     Body Fat Loss:  Severe body fat loss Triceps, Fat Overlying Ribs, Buccal region   Muscle Mass Loss:  Severe muscle mass loss Thigh (quadriceps), Scapula (trapezius)  Fluid Accumulation:  No fluid accumulation     Strength:  Not Performed    Nutrition Assessment:  Pt admitted with seizures.  PMH: GERD, dementia, CKD stage 3, nonverbal, bedbound and contracted.  Chart reviewed, case discussed during CCU rounds.  Pt lying in bed sleeping soundly, no family in the room to speak with.  Pt is very cachectic.  He had a PEG in 2022, unsure when this was removed.  RN reports that wife feeds him a pureed diet, unsure of his appetite.  He has lost 40lb since 2022, unsure how quickly he lost this wt.  Dobbhoff coiled in the stomach this morning so was replaced with NGT for meds, FWF and short term TF.  Na 150 so will give liberal free water flushes, he is also on D5W@ 100mL/h, which provides 408 kcals.  Will start him on TF at 10mL/h (provides 360 kcals/15gPro/51gCHO/1382mL) and closely monitor lytes as rate advances.  Will start high dose thiamine to help support CHO metabolism in presence of severe chronic malnutrition.     Wt Readings from Last 10 Encounters:   12/20/24 55.9 kg (123 lb 3.8 oz)   07/20/24 57 kg (125 lb 10.6 oz)            Nutrition Related Findings:    Meds: rocephin, iron, folvite, reglan,

## 2024-12-20 NOTE — PROGRESS NOTES
attended Interdisciplinary Rounds. Patient's medical status was discussed.     Chaplain Anselmo Palumbo, PhD  [facility]   Paging Service [phone])

## 2024-12-20 NOTE — PROGRESS NOTES
TRANSFER - IN REPORT:    Verbal report received from Shanthi HATHAWAY on Nehemias Burleson Jr.  being received from ED 37 for routine progression of patient care      Report consisted of patient's Situation, Background, Assessment and   Recommendations(SBAR).     Information from the following report(s) Nurse Handoff Report, ED Encounter Summary, ED SBAR, Adult Overview, MAR, Recent Results, Cardiac Rhythm NSR, Alarm Parameters, Neuro Assessment, and Event Log was reviewed with the receiving nurse.    Opportunity for questions and clarification was provided.      Assessment completed upon patient's arrival to unit and care assumed.     Patient placed on monitor upon arrival to bed 3336.  Luz Maria NP at bedside.  Please see order sets.  Two nurse skin assessment completed with José Luis RN.  Deep tissue injury noted on sacrum and posterior scalpula (images provided in chart).  Patient lethargic, non-verbal, not following commands.  EEG tech at bedside and patient on 24hr EEG.  Unable to complete all of the admission pathway due to patient's mentation and no family at bedside.

## 2024-12-20 NOTE — WOUND CARE
Wound care consult for wounds present on admission. Chart reviewed and patient assessed.  Pt. Lives at home with his caregiver.  He is bed bound at baseline.   Past Medical History:   Diagnosis Date    Cholesterolosis     Chronic kidney disease     GERD (gastroesophageal reflux disease)     Hypercholesterolemia     Hypertension     Kidney disease     Seizure (HCC)     Stroke (HCC)      No past surgical history on file.    Assessment: Pt. Is bedbound and has dementia and seizure disorder.  Upper back Scarring / hyperpigmentation  Without inflammation:       Sacrum pink and dry - skin is well cared  For at home. This is old wounds.       Recommended Treatment: Zinc oxide cream for the sacrum and buttocks and keep the sacrum off loaded from the bed surface.   Foam dressing for the upper back scar.   Wound care orders written.   Terrie Espinoza RN, BSN, CWON

## 2024-12-20 NOTE — PROGRESS NOTES
0725-Bedside shift change report given to MAG Padron (oncoming nurse) by MAG Cartwright (offgoing nurse). Report included the following information SBAR, Intake/Output, MAR, Recent Results, and Cardiac Rhythm -NSR/SR .     0900-Shift assessment complete-see flowsheets for detailed findings. Pt. is nonverbal, lying in bed, and resting quietly.    1140-Interdisciplinary team rounds were held 12/20/2024 with the following team members: Physician, Nursing, Dietitian, Pharmacist, , , and the CCU Charge RN.    Plan of care discussed. See clinical pathway and/or care plan for interventions and desired outcomes.    Goals of the Day: Remove Dobhoff due to coiling, place NGT, and begin TF.    1200-Reassessment complete-no changes noted.    1600-Reassessment complete-no changes noted.    1910-Bedside shift change report given to MAG Mendez (oncoming nurse) by MAG Padron (offgoing nurse). Report included the following information SBAR, Intake/Output, MAR, Recent Results, and Cardiac Rhythm -NSR .

## 2024-12-21 LAB
ANION GAP SERPL CALC-SCNC: 2 MMOL/L (ref 2–12)
BUN SERPL-MCNC: 32 MG/DL (ref 6–20)
BUN/CREAT SERPL: 18 (ref 12–20)
CALCIUM SERPL-MCNC: 8.9 MG/DL (ref 8.5–10.1)
CHLORIDE SERPL-SCNC: 116 MMOL/L (ref 97–108)
CO2 SERPL-SCNC: 23 MMOL/L (ref 21–32)
CREAT SERPL-MCNC: 1.78 MG/DL (ref 0.7–1.3)
ERYTHROCYTE [DISTWIDTH] IN BLOOD BY AUTOMATED COUNT: 13.9 % (ref 11.5–14.5)
GLUCOSE BLD STRIP.AUTO-MCNC: 127 MG/DL (ref 65–117)
GLUCOSE BLD STRIP.AUTO-MCNC: 144 MG/DL (ref 65–117)
GLUCOSE SERPL-MCNC: 119 MG/DL (ref 65–100)
HCT VFR BLD AUTO: 38.4 % (ref 36.6–50.3)
HGB BLD-MCNC: 12.1 G/DL (ref 12.1–17)
MAGNESIUM SERPL-MCNC: 2.1 MG/DL (ref 1.6–2.4)
MAGNESIUM SERPL-MCNC: NORMAL MG/DL (ref 1.6–2.4)
MCH RBC QN AUTO: 30.9 PG (ref 26–34)
MCHC RBC AUTO-ENTMCNC: 31.5 G/DL (ref 30–36.5)
MCV RBC AUTO: 98 FL (ref 80–99)
NRBC # BLD: 0 K/UL (ref 0–0.01)
NRBC BLD-RTO: 0 PER 100 WBC
PHOSPHATE SERPL-MCNC: 3 MG/DL (ref 2.6–4.7)
PLATELET # BLD AUTO: 291 K/UL (ref 150–400)
PMV BLD AUTO: 12.1 FL (ref 8.9–12.9)
POTASSIUM SERPL-SCNC: 4.5 MMOL/L (ref 3.5–5.1)
POTASSIUM SERPL-SCNC: ABNORMAL MMOL/L (ref 3.5–5.1)
RBC # BLD AUTO: 3.92 M/UL (ref 4.1–5.7)
SERVICE CMNT-IMP: ABNORMAL
SERVICE CMNT-IMP: ABNORMAL
SODIUM SERPL-SCNC: 141 MMOL/L (ref 136–145)
WBC # BLD AUTO: 12.9 K/UL (ref 4.1–11.1)

## 2024-12-21 PROCEDURE — 6360000002 HC RX W HCPCS: Performed by: INTERNAL MEDICINE

## 2024-12-21 PROCEDURE — 2500000003 HC RX 250 WO HCPCS: Performed by: INTERNAL MEDICINE

## 2024-12-21 PROCEDURE — 80048 BASIC METABOLIC PNL TOTAL CA: CPT

## 2024-12-21 PROCEDURE — 2060000000 HC ICU INTERMEDIATE R&B

## 2024-12-21 PROCEDURE — 6370000000 HC RX 637 (ALT 250 FOR IP): Performed by: HOSPITALIST

## 2024-12-21 PROCEDURE — 84132 ASSAY OF SERUM POTASSIUM: CPT

## 2024-12-21 PROCEDURE — 82962 GLUCOSE BLOOD TEST: CPT

## 2024-12-21 PROCEDURE — 83735 ASSAY OF MAGNESIUM: CPT

## 2024-12-21 PROCEDURE — 36415 COLL VENOUS BLD VENIPUNCTURE: CPT

## 2024-12-21 PROCEDURE — 2500000003 HC RX 250 WO HCPCS: Performed by: HOSPITALIST

## 2024-12-21 PROCEDURE — 2580000003 HC RX 258: Performed by: NURSE PRACTITIONER

## 2024-12-21 PROCEDURE — 51798 US URINE CAPACITY MEASURE: CPT

## 2024-12-21 PROCEDURE — 2580000003 HC RX 258: Performed by: PSYCHIATRY & NEUROLOGY

## 2024-12-21 PROCEDURE — 6360000002 HC RX W HCPCS: Performed by: HOSPITALIST

## 2024-12-21 PROCEDURE — 92610 EVALUATE SWALLOWING FUNCTION: CPT

## 2024-12-21 PROCEDURE — 6370000000 HC RX 637 (ALT 250 FOR IP): Performed by: INTERNAL MEDICINE

## 2024-12-21 PROCEDURE — 2500000003 HC RX 250 WO HCPCS: Performed by: PSYCHIATRY & NEUROLOGY

## 2024-12-21 PROCEDURE — 85027 COMPLETE CBC AUTOMATED: CPT

## 2024-12-21 PROCEDURE — 6360000002 HC RX W HCPCS: Performed by: NURSE PRACTITIONER

## 2024-12-21 PROCEDURE — 84100 ASSAY OF PHOSPHORUS: CPT

## 2024-12-21 RX ORDER — METOCLOPRAMIDE HYDROCHLORIDE 5 MG/ML
5 INJECTION INTRAMUSCULAR; INTRAVENOUS EVERY 8 HOURS
Status: DISCONTINUED | OUTPATIENT
Start: 2024-12-21 | End: 2024-12-26 | Stop reason: HOSPADM

## 2024-12-21 RX ADMIN — ISOSORBIDE DINITRATE 20 MG: 10 TABLET ORAL at 11:10

## 2024-12-21 RX ADMIN — LEVETIRACETAM 750 MG: 100 INJECTION INTRAVENOUS at 22:45

## 2024-12-21 RX ADMIN — HEPARIN SODIUM 5000 UNITS: 5000 INJECTION INTRAVENOUS; SUBCUTANEOUS at 06:04

## 2024-12-21 RX ADMIN — ATORVASTATIN CALCIUM 40 MG: 40 TABLET, FILM COATED ORAL at 11:10

## 2024-12-21 RX ADMIN — ASPIRIN 81 MG: 81 TABLET, CHEWABLE ORAL at 11:12

## 2024-12-21 RX ADMIN — WATER 1000 MG: 1 INJECTION INTRAMUSCULAR; INTRAVENOUS; SUBCUTANEOUS at 14:09

## 2024-12-21 RX ADMIN — VALPROATE SODIUM 250 MG: 100 INJECTION, SOLUTION INTRAVENOUS at 18:04

## 2024-12-21 RX ADMIN — METOCLOPRAMIDE 5 MG: 5 INJECTION, SOLUTION INTRAMUSCULAR; INTRAVENOUS at 01:42

## 2024-12-21 RX ADMIN — HEPARIN SODIUM 5000 UNITS: 5000 INJECTION INTRAVENOUS; SUBCUTANEOUS at 21:56

## 2024-12-21 RX ADMIN — ISOSORBIDE DINITRATE 20 MG: 10 TABLET ORAL at 14:09

## 2024-12-21 RX ADMIN — METOCLOPRAMIDE 5 MG: 5 INJECTION, SOLUTION INTRAMUSCULAR; INTRAVENOUS at 11:13

## 2024-12-21 RX ADMIN — VALPROATE SODIUM 250 MG: 100 INJECTION, SOLUTION INTRAVENOUS at 23:30

## 2024-12-21 RX ADMIN — FERROUS SULFATE TAB 325 MG (65 MG ELEMENTAL FE) 325 MG: 325 (65 FE) TAB at 06:04

## 2024-12-21 RX ADMIN — Medication 1 AMPULE: at 21:53

## 2024-12-21 RX ADMIN — HYDRALAZINE HYDROCHLORIDE 10 MG: 20 INJECTION INTRAMUSCULAR; INTRAVENOUS at 03:04

## 2024-12-21 RX ADMIN — METOCLOPRAMIDE 5 MG: 5 INJECTION, SOLUTION INTRAMUSCULAR; INTRAVENOUS at 21:53

## 2024-12-21 RX ADMIN — LEVETIRACETAM 750 MG: 100 INJECTION INTRAVENOUS at 11:15

## 2024-12-21 RX ADMIN — SODIUM CHLORIDE, PRESERVATIVE FREE 10 ML: 5 INJECTION INTRAVENOUS at 21:56

## 2024-12-21 RX ADMIN — HEPARIN SODIUM 5000 UNITS: 5000 INJECTION INTRAVENOUS; SUBCUTANEOUS at 14:09

## 2024-12-21 RX ADMIN — THIAMINE HCL TAB 100 MG 200 MG: 100 TAB at 11:10

## 2024-12-21 RX ADMIN — FOLIC ACID 1000 MCG: 1 TABLET ORAL at 11:10

## 2024-12-21 RX ADMIN — Medication 1 AMPULE: at 11:21

## 2024-12-21 RX ADMIN — PANTOPRAZOLE SODIUM 40 MG: 40 INJECTION, POWDER, FOR SOLUTION INTRAVENOUS at 11:14

## 2024-12-21 RX ADMIN — CYANOCOBALAMIN TAB 500 MCG 1000 MCG: 500 TAB at 11:13

## 2024-12-21 RX ADMIN — VALPROATE SODIUM 250 MG: 100 INJECTION, SOLUTION INTRAVENOUS at 06:09

## 2024-12-21 RX ADMIN — VALPROATE SODIUM 250 MG: 100 INJECTION, SOLUTION INTRAVENOUS at 12:40

## 2024-12-21 RX ADMIN — SODIUM CHLORIDE, PRESERVATIVE FREE 10 ML: 5 INJECTION INTRAVENOUS at 11:15

## 2024-12-21 ASSESSMENT — PAIN SCALES - WONG BAKER: WONGBAKER_NUMERICALRESPONSE: NO HURT

## 2024-12-21 ASSESSMENT — PAIN SCALES - PAIN ASSESSMENT IN ADVANCED DEMENTIA (PAINAD)
FACIALEXPRESSION: SMILING OR INEXPRESSIVE
CONSOLABILITY: NO NEED TO CONSOLE
BREATHING: NORMAL
BODYLANGUAGE: RELAXED
TOTALSCORE: 0

## 2024-12-21 NOTE — CASE COMMUNICATION
COMMUNICATION NOTE - Neurology Service    Name:  Nehemias Burleson Jr.     MRN: [id number]         Communication Note:   I discussed with the ICU team  LTM EEG , negative for any further seizures. Nonspecific encephalopathy changes, with focal slowing in the right side, suggestive of neuronal dysfunction noted.   Depacon 250 mg every 6 hours, has been on Keppra 750 twice a day at home which has been continued   Follow up with Neurology in the out patient clinic.  I explained in detail about the current condition.   Rest of the management per primary team.  Neurology will sign off.   Please do not hesitate to call with questions or concerns.  Please let us know if we can provide any additional information.

## 2024-12-21 NOTE — PLAN OF CARE
Speech LAnguage Pathology EVALUATION/DISCHARGE    Patient: Neheimas Burleson Jr. (79 y.o. male)  Date: 12/21/2024  Primary Diagnosis: Status epilepticus (HCC) [G40.901]  Hypernatremia [E87.0]  Seizure (HCC) [R56.9]  PAXTON (acute kidney injury) (HCC) [N17.9]  Breakthrough seizure (HCC) [G40.919]       Precautions:                     ASSESSMENT :  Patient presents with presumed baseline oropharyngeal swallow function (given h/o dementia, stroke, and seizure). Patient's wife reports patient on pureed diet at baseline and with great insight into safe swallow precautions, as well as, assistance with feeding. Patient positioned upright and provided trials of thin via cup (patient does not use straw) and puree. Patient with anterior loss of partial thin bolus initially but as session progressed, no anterior loss was noted. Patient with slowed oral manipulation/transit inconsistently. Patient benefited from occasional liquid was to assist with oral cavity clearance. No overt clinical s/s aspiration noted across PO trials.     Given that patient is presumably at his baseline, recommend continuation of baseline diet with general aspiration precautions outlined below.      Patient will be discharged from skilled speech-language pathology services at this time.     PLAN :  Recommendations and Planned Interventions:  Diet: Puree and thin liquids  --PO only when alert/willing to accept   --Medications crushed in puree (I.e. applesauce), if approved by pharmacy   --Upright all PO intake   --Single bites/sips   --Alternate solids & liquids   --Slow rate  --Oral hygiene 2-3x/day  --1:1 supervision/assistance     Tips for assisting people with dementia at mealtimes  Environmental modifications  · Ensure mealtimes are relaxed and unhurried; eat together to encourage participation  · Decrease distractions (e.g. turn off the TV, provide only the utensil needed the food item)  · Use smaller plates with color contrast between plate and food to

## 2024-12-21 NOTE — PROGRESS NOTES
End of Shift Note    Bedside shift change report given to Shanthi (oncoming nurse) by Vanessa Roman RN (offgoing nurse).  Report included the following information SBAR, Kardex, Intake/Output, MAR, Recent Results, and Cardiac Rhythm NSR    Shift worked:  [id number]     Shift summary and any significant changes:     New IV placed     Concerns for physician to address:       Zone phone for oncoming shift:           Activity:     Number times ambulated in hallways past shift: 0  Number of times OOB to chair past shift: 0    Cardiac:   Cardiac Monitoring: Yes      Cardiac Rhythm: Sinus rhythm    Access:  Current line(s): PIV     Genitourinary:   Urinary Status: Voiding, External catheter    Respiratory:   O2 Device: None (Room air)  Chronic home O2 use?: NO  Incentive spirometer at bedside: NO    GI:  Last BM (including prior to admit):  (PTA)  Current diet:  Diet NPO  ADULT TUBE FEEDING; Nasogastric; Standard with Fiber; Continuous; 10; Yes; 5; Q 12 hours; 45; 200; Q 4 hours  DIET ONE TIME MESSAGE;  Passing flatus:     Pain Management:   Patient states pain is manageable on current regimen: N/A    Skin:  Tomas Scale Score: 10  Interventions: Wound Offloading (Prevention Methods): Bed, pressure reduction mattress, Elevate heels, Pillows, Repositioning, Turning    Patient Safety:  Fall Risk: Nursing Judgement-Fall Risk High(Add Comments): No  Fall Risk Interventions  Nursing Judgement-Fall Risk High(Add Comments): No  Toilet Every 2 Hours-In Advance of Need: Yes  Hourly Visual Checks: Eyes closed, In bed  Fall Visual Posted: Armband  Room Door Open: Yes  Alarm On: Bed  Patient Moved Closer to Nursing Station: No    Active Consults:   IP CONSULT TO TELE-NEUROLOGY  IP CONSULT TO HOSPITALIST  IP CONSULT TO NEUROLOGY  IP CONSULT TO INTENSIVIST  IP CONSULT TO PALLIATIVE CARE  IP CONSULT TO DIETITIAN    Length of Stay:  Expected LOS: 5  Actual LOS: 2    Vanessa Roman RN

## 2024-12-21 NOTE — PROGRESS NOTES
CRITICAL CARE NOTE    Name: Nehemias Burleson Jr.   : 1945   MRN: [id number]   Date: 2024      REASON FOR ICU ADMISSION:  Breakthrough seizures     PRINCIPAL ICU DIAGNOSIS     Seizure disorder c/b breakthrough seizures  Vascular dementia  Acute on chronic deconditioning  PAXTON on CKD3    BRIEF PATIENT SUMMARY     Nehemias Burleson Jr is an 80 yo male with a PMH of vascular dementia, CKD3, chronic R hemispheric infarct, seizures on keppra, bedbound status at baseline who had recently been experiencing increased seizure frequency, prompting his primary neurologist to increase baseline keppra dose from 500mg BID to 750mg BID. He continued to have breakthrough seizures as many as 5-6x/day for the last few days.  He was seen by his home visit physician on  who recommended he go to the ED. In the ED, he had two witnessed seizures requiring ativan. He was loaded with fosphenytoin and was started on depakote and remains on keppra.    He was transferred to ICU and started on cEEG. He is protecting his airway with a strong cough and gag. Infectious workup ongoing. He was started on empiric ceftriaxone.    : stable overnight. Remains lethargic and minimally responsive. Baseline is quite limited per family. No fever. Very weak cough. NG placed.    : stable overnight. Slightly more active than yesterday. Weak cough.     Current Outpatient Medications on File Prior to Visit   Medication Sig Dispense Refill    isosorbide dinitrate (ISORDIL) 20 MG tablet Take 1 tablet by mouth 3 times daily (To be crushed) 270 tablet 3    levETIRAcetam (KEPPRA) 100 MG/ML oral solution GIVE 5 ML BY MOUTH TWO TIMES DAILY 473 mL 2    atorvastatin (LIPITOR) 40 MG tablet TAKE 1 TABLET BY MOUTH EVERY DAY 90 tablet 1    ferrous sulfate (IRON 325) 325 (65 Fe) MG tablet TAKE 1 TABLET BY MOUTH EVERY DAY BEFORE BREAKFAST 90 tablet 1    folic acid (FOLVITE) 1 MG tablet TAKE 1 TABLET BY MOUTH EVERY DAY 90 tablet 1    cyanocobalamin 1000 MCG

## 2024-12-21 NOTE — PROGRESS NOTES
Hospitalist Progress Note    NAME: Nehemias Burleson Jr.   :  1945   MRN:  [id number]       Assessment / Plan:    79-year-old male with past medical history of vascular dementia, CKD stage III, chronic right hemispheric infarct, seizures on Keppra, bedbound status at baseline admitted with increased seizure activity from home.  Patient Keppra was recently increased to 750 twice daily and was advised by home visit physician for persistent seizure to go to the emergency room for for further evaluation.  He received loading fosphenytoin and was started on Depakote and admitted to ICU with EEG monitoring showed no active epileptic focus.  Patient has strong cough and gag reflex unable to protect airway.  Empirically placed on ceftriaxone.  Patient remained stable without any seizure activity and transferred to the floor for further care.    Seizure disorder c/b breakthrough seizures  - Neurology following  - cEEG completed  - Keppra 750 q12h  - Received fosphenytoin for status  - Continue depakote 250 q6h  - CT head non-con negative  - Continue CTX for potential UTI - f/u urine culture.       Vascular dementia c/b acute on chronic deconditioning  - Palliative consulted.  - per family, patient has very limited quality of life at baseline and he has been declining significantly and failing to thrive in last one month.   On ASA and statin.     Monitor for refeeding. Nutrition consult.  Speech therapy consulted.   Continue with supplement. On PPI.      Severe protein calorie malnutrition with cachexia and hypoalbuminemia    Hypernatremia:   Improved hypernatremia         18.5 - 24.9 Normal weight / Body mass index is 19.3 kg/m².    Estimated discharge date:   Barriers:    Code status: Full  Prophylaxis: Lovenox  Recommended Disposition: Home w/Family     Subjective:     Chief Complaint / Reason for Physician Visit  \"Admitted for seizure. ICU for airway protection. Remain seziure free. Transfer out of ICU. \".   Discussed with RN events overnight.     Review of Systems:  Symptom Y/N Comments  Symptom Y/N Comments   Fever/Chills    Chest Pain     Poor Appetite    Edema     Cough    Abdominal Pain     Sputum    Joint Pain     SOB/TRAORE    Pruritis/Rash     Nausea/vomit    Tolerating PT/OT     Diarrhea    Tolerating Diet     Constipation    Other       Could NOT obtain due to: Mental status.      Objective:     VITALS:   Last 24hrs VS reviewed since prior progress note. Most recent are:  [unfilled]    Intake/Output Summary (Last 24 hours) at 12/21/2024 1247  Last data filed at 12/21/2024 1028  Gross per 24 hour   Intake 1772.84 ml   Output 900 ml   Net 872.84 ml        I had a face to face encounter and independently examined this patient on 12/21/2024, as outlined below:  PHYSICAL EXAM:  General: WD, WN. Alert, cooperative, no acute distress    EENT:  EOMI. Anicteric sclerae. MMM  Resp:  CTA bilaterally, no wheezing or rales.  No accessory muscle use  CV:  Regular  rhythm,  No edema  GI:  Soft, Non distended, Non tender.  +Bowel sounds  Neurologic:  Alert and oriented X 2, post stroke with contracture.   Psych:   Good insight. Not anxious nor agitated  Skin:  No rashes.  No jaundice    Reviewed most current lab test results and cultures  YES  Reviewed most current radiology test results   YES  Review and summation of old records today    NO  Reviewed patient's current orders and MAR    YES  PMH/ reviewed - no change compared to H&P  ________________________________________________________________________  Care Plan discussed with:    Comments   Patient x    Family  x    RN x    Care Manager     Consultant  x                      Multidiciplinary team rounds were held today with , nursing, pharmacist and clinical coordinator.  Patient's plan of care was discussed; medications were reviewed and discharge planning was addressed.     ________________________________________________________________________  Total NON

## 2024-12-21 NOTE — PROGRESS NOTES
Bedside and Verbal shift change report given to chela (oncoming nurse) by j luis (offgoing nurse). Report included the following information Nurse Handoff Report, Index, Intake/Output, MAR, Recent Results, Quality Measures, and Neuro Assessment.     0900 manwick leak changed gown and pad and gave patient a condom cath    1030 passed speech eval for pureed diet. 1:1 assistance    1800 called report to Pippa on CMSU    1840 Called GET, wife to let her know the new room number

## 2024-12-22 LAB
ANION GAP SERPL CALC-SCNC: 8 MMOL/L (ref 2–12)
BACTERIA SPEC CULT: ABNORMAL
BACTERIA SPEC CULT: ABNORMAL
BUN SERPL-MCNC: 24 MG/DL (ref 6–20)
BUN/CREAT SERPL: 15 (ref 12–20)
CALCIUM SERPL-MCNC: 8.8 MG/DL (ref 8.5–10.1)
CC UR VC: ABNORMAL
CHLORIDE SERPL-SCNC: 116 MMOL/L (ref 97–108)
CO2 SERPL-SCNC: 21 MMOL/L (ref 21–32)
CREAT SERPL-MCNC: 1.55 MG/DL (ref 0.7–1.3)
EKG ATRIAL RATE: 124 BPM
EKG ATRIAL RATE: 128 BPM
EKG ATRIAL RATE: 136 BPM
EKG ATRIAL RATE: 94 BPM
EKG DIAGNOSIS: NORMAL
EKG P AXIS: 2 DEGREES
EKG P AXIS: 40 DEGREES
EKG P AXIS: 51 DEGREES
EKG P AXIS: 66 DEGREES
EKG P-R INTERVAL: 136 MS
EKG P-R INTERVAL: 152 MS
EKG P-R INTERVAL: 154 MS
EKG P-R INTERVAL: 160 MS
EKG Q-T INTERVAL: 304 MS
EKG Q-T INTERVAL: 318 MS
EKG Q-T INTERVAL: 334 MS
EKG Q-T INTERVAL: 364 MS
EKG QRS DURATION: 60 MS
EKG QRS DURATION: 62 MS
EKG QRS DURATION: 68 MS
EKG QRS DURATION: 82 MS
EKG QTC CALCULATION (BAZETT): 417 MS
EKG QTC CALCULATION (BAZETT): 443 MS
EKG QTC CALCULATION (BAZETT): 456 MS
EKG QTC CALCULATION (BAZETT): 547 MS
EKG R AXIS: 10 DEGREES
EKG R AXIS: 21 DEGREES
EKG R AXIS: 48 DEGREES
EKG R AXIS: 7 DEGREES
EKG T AXIS: 59 DEGREES
EKG T AXIS: 67 DEGREES
EKG T AXIS: 71 DEGREES
EKG T AXIS: 81 DEGREES
EKG VENTRICULAR RATE: 124 BPM
EKG VENTRICULAR RATE: 128 BPM
EKG VENTRICULAR RATE: 136 BPM
EKG VENTRICULAR RATE: 94 BPM
ERYTHROCYTE [DISTWIDTH] IN BLOOD BY AUTOMATED COUNT: 13.9 % (ref 11.5–14.5)
GLUCOSE SERPL-MCNC: 89 MG/DL (ref 65–100)
HCT VFR BLD AUTO: 37 % (ref 36.6–50.3)
HGB BLD-MCNC: 11.7 G/DL (ref 12.1–17)
MAGNESIUM SERPL-MCNC: 2.1 MG/DL (ref 1.6–2.4)
MCH RBC QN AUTO: 30 PG (ref 26–34)
MCHC RBC AUTO-ENTMCNC: 31.6 G/DL (ref 30–36.5)
MCV RBC AUTO: 94.9 FL (ref 80–99)
NRBC # BLD: 0 K/UL (ref 0–0.01)
NRBC BLD-RTO: 0 PER 100 WBC
PHOSPHATE SERPL-MCNC: 2.2 MG/DL (ref 2.6–4.7)
PLATELET # BLD AUTO: 233 K/UL (ref 150–400)
PMV BLD AUTO: 11.9 FL (ref 8.9–12.9)
POTASSIUM SERPL-SCNC: 3.7 MMOL/L (ref 3.5–5.1)
RBC # BLD AUTO: 3.9 M/UL (ref 4.1–5.7)
SERVICE CMNT-IMP: ABNORMAL
SODIUM SERPL-SCNC: 145 MMOL/L (ref 136–145)
WBC # BLD AUTO: 10.4 K/UL (ref 4.1–11.1)

## 2024-12-22 PROCEDURE — 2500000003 HC RX 250 WO HCPCS: Performed by: PSYCHIATRY & NEUROLOGY

## 2024-12-22 PROCEDURE — 6370000000 HC RX 637 (ALT 250 FOR IP): Performed by: HOSPITALIST

## 2024-12-22 PROCEDURE — 2060000000 HC ICU INTERMEDIATE R&B

## 2024-12-22 PROCEDURE — 2580000003 HC RX 258: Performed by: INTERNAL MEDICINE

## 2024-12-22 PROCEDURE — 2580000003 HC RX 258: Performed by: PSYCHIATRY & NEUROLOGY

## 2024-12-22 PROCEDURE — 84100 ASSAY OF PHOSPHORUS: CPT

## 2024-12-22 PROCEDURE — 80048 BASIC METABOLIC PNL TOTAL CA: CPT

## 2024-12-22 PROCEDURE — 2580000003 HC RX 258: Performed by: NURSE PRACTITIONER

## 2024-12-22 PROCEDURE — 36415 COLL VENOUS BLD VENIPUNCTURE: CPT

## 2024-12-22 PROCEDURE — 2580000003 HC RX 258: Performed by: HOSPITALIST

## 2024-12-22 PROCEDURE — 6360000002 HC RX W HCPCS: Performed by: INTERNAL MEDICINE

## 2024-12-22 PROCEDURE — 85027 COMPLETE CBC AUTOMATED: CPT

## 2024-12-22 PROCEDURE — 2500000003 HC RX 250 WO HCPCS: Performed by: INTERNAL MEDICINE

## 2024-12-22 PROCEDURE — 2500000003 HC RX 250 WO HCPCS: Performed by: HOSPITALIST

## 2024-12-22 PROCEDURE — 83735 ASSAY OF MAGNESIUM: CPT

## 2024-12-22 PROCEDURE — 6360000002 HC RX W HCPCS: Performed by: NURSE PRACTITIONER

## 2024-12-22 PROCEDURE — 93005 ELECTROCARDIOGRAM TRACING: CPT | Performed by: INTERNAL MEDICINE

## 2024-12-22 PROCEDURE — 6370000000 HC RX 637 (ALT 250 FOR IP): Performed by: INTERNAL MEDICINE

## 2024-12-22 PROCEDURE — 6360000002 HC RX W HCPCS: Performed by: HOSPITALIST

## 2024-12-22 RX ORDER — METOPROLOL TARTRATE 25 MG/1
25 TABLET, FILM COATED ORAL 2 TIMES DAILY
Status: DISCONTINUED | OUTPATIENT
Start: 2024-12-22 | End: 2024-12-22

## 2024-12-22 RX ORDER — 0.9 % SODIUM CHLORIDE 0.9 %
500 INTRAVENOUS SOLUTION INTRAVENOUS ONCE
Status: COMPLETED | OUTPATIENT
Start: 2024-12-22 | End: 2024-12-22

## 2024-12-22 RX ORDER — METOPROLOL TARTRATE 1 MG/ML
5 INJECTION, SOLUTION INTRAVENOUS EVERY 6 HOURS
Status: DISCONTINUED | OUTPATIENT
Start: 2024-12-22 | End: 2024-12-25

## 2024-12-22 RX ADMIN — SODIUM CHLORIDE 500 ML: 9 INJECTION, SOLUTION INTRAVENOUS at 06:40

## 2024-12-22 RX ADMIN — METOCLOPRAMIDE 5 MG: 5 INJECTION, SOLUTION INTRAMUSCULAR; INTRAVENOUS at 13:02

## 2024-12-22 RX ADMIN — Medication 1 AMPULE: at 08:37

## 2024-12-22 RX ADMIN — SODIUM CHLORIDE: 9 INJECTION, SOLUTION INTRAVENOUS at 13:08

## 2024-12-22 RX ADMIN — ISOSORBIDE DINITRATE 20 MG: 10 TABLET ORAL at 08:15

## 2024-12-22 RX ADMIN — METOCLOPRAMIDE 5 MG: 5 INJECTION, SOLUTION INTRAMUSCULAR; INTRAVENOUS at 20:12

## 2024-12-22 RX ADMIN — LORAZEPAM 1 MG: 2 INJECTION INTRAMUSCULAR; INTRAVENOUS at 05:58

## 2024-12-22 RX ADMIN — METOPROLOL TARTRATE 5 MG: 5 INJECTION INTRAVENOUS at 22:18

## 2024-12-22 RX ADMIN — HEPARIN SODIUM 5000 UNITS: 5000 INJECTION INTRAVENOUS; SUBCUTANEOUS at 05:34

## 2024-12-22 RX ADMIN — MINERAL SUPPLEMENT IRON 300 MG / 5 ML STRENGTH LIQUID 100 PER BOX UNFLAVORED 300 MG: at 08:36

## 2024-12-22 RX ADMIN — METOPROLOL TARTRATE 5 MG: 5 INJECTION INTRAVENOUS at 10:37

## 2024-12-22 RX ADMIN — SODIUM CHLORIDE, PRESERVATIVE FREE 10 ML: 5 INJECTION INTRAVENOUS at 08:17

## 2024-12-22 RX ADMIN — VALPROATE SODIUM 250 MG: 100 INJECTION, SOLUTION INTRAVENOUS at 23:20

## 2024-12-22 RX ADMIN — ASPIRIN 81 MG: 81 TABLET, CHEWABLE ORAL at 08:15

## 2024-12-22 RX ADMIN — FOLIC ACID 1000 MCG: 1 TABLET ORAL at 08:14

## 2024-12-22 RX ADMIN — ATORVASTATIN CALCIUM 40 MG: 40 TABLET, FILM COATED ORAL at 08:14

## 2024-12-22 RX ADMIN — HEPARIN SODIUM 5000 UNITS: 5000 INJECTION INTRAVENOUS; SUBCUTANEOUS at 13:02

## 2024-12-22 RX ADMIN — LEVETIRACETAM 750 MG: 100 INJECTION INTRAVENOUS at 22:17

## 2024-12-22 RX ADMIN — VALPROATE SODIUM 250 MG: 100 INJECTION, SOLUTION INTRAVENOUS at 13:08

## 2024-12-22 RX ADMIN — PANTOPRAZOLE SODIUM 40 MG: 40 INJECTION, POWDER, FOR SOLUTION INTRAVENOUS at 08:16

## 2024-12-22 RX ADMIN — VALPROATE SODIUM 250 MG: 100 INJECTION, SOLUTION INTRAVENOUS at 17:14

## 2024-12-22 RX ADMIN — SODIUM CHLORIDE: 9 INJECTION, SOLUTION INTRAVENOUS at 17:13

## 2024-12-22 RX ADMIN — METOPROLOL TARTRATE 5 MG: 5 INJECTION INTRAVENOUS at 15:19

## 2024-12-22 RX ADMIN — SODIUM CHLORIDE 500 ML: 9 INJECTION, SOLUTION INTRAVENOUS at 08:57

## 2024-12-22 RX ADMIN — LEVETIRACETAM 750 MG: 100 INJECTION INTRAVENOUS at 10:38

## 2024-12-22 RX ADMIN — HEPARIN SODIUM 5000 UNITS: 5000 INJECTION INTRAVENOUS; SUBCUTANEOUS at 22:17

## 2024-12-22 RX ADMIN — HYDRALAZINE HYDROCHLORIDE 10 MG: 20 INJECTION INTRAMUSCULAR; INTRAVENOUS at 03:54

## 2024-12-22 RX ADMIN — Medication 1 AMPULE: at 22:17

## 2024-12-22 RX ADMIN — VALPROATE SODIUM 250 MG: 100 INJECTION, SOLUTION INTRAVENOUS at 05:25

## 2024-12-22 RX ADMIN — SODIUM CHLORIDE, PRESERVATIVE FREE 10 ML: 5 INJECTION INTRAVENOUS at 20:12

## 2024-12-22 RX ADMIN — THIAMINE HCL TAB 100 MG 200 MG: 100 TAB at 08:14

## 2024-12-22 RX ADMIN — WATER 1000 MG: 1 INJECTION INTRAMUSCULAR; INTRAVENOUS; SUBCUTANEOUS at 15:19

## 2024-12-22 RX ADMIN — CYANOCOBALAMIN TAB 500 MCG 1000 MCG: 500 TAB at 08:15

## 2024-12-22 ASSESSMENT — PAIN SCALES - GENERAL
PAINLEVEL_OUTOF10: 0

## 2024-12-22 ASSESSMENT — PAIN SCALES - WONG BAKER: WONGBAKER_NUMERICALRESPONSE: NO HURT

## 2024-12-22 NOTE — PROGRESS NOTES
Nursing contacted Nocturnist/cross cover provider via non-urgent messaging system [employer] and notified patient hr 120s, ekg done, seizures poa and remains non verbal, states having repeated mouth motion when confirmed if any other issues, no adventitious lung sounds reported, states doesn't appear seizure activity.. No other concerns reported. No acute distress reported. No other information provided by nurse. VSS.     Ordered stat ekg- upon initial review sinus tachy rate 124-, no ischemia, pending final cards review, nurse to get am labs now, ns bolus 500ml x1, asked nurse d/w rapid nurse at minimal or call rapid response as repetitive mouth chewing motion sounds suspicious for seizure activity to me. . Will defer further evaluation/management to the day shift primary attending care team. Patient denies any further complaints or concerns.     Nursing to notify Hospitalist for further/continued concerns. Will remain available overnight for further concerns if nursing/patient needs. Please note, there are RRT systems in this hospital in place that if nursing has acute or critical patient condition change or concern, this is to help facilitate and notify that patient needs immediate bedside evaluation by a provider.     Non-billable note.

## 2024-12-22 NOTE — PROGRESS NOTES
Hospitalist Progress Note    NAME: Nehemias Burleson Jr.   :  1945   MRN:  [id number]       Assessment / Plan:    79-year-old male with past medical history of vascular dementia, CKD stage III, chronic right hemispheric infarct, seizures on Keppra, bedbound status at baseline admitted with increased seizure activity from home.  Patient Keppra was recently increased to 750 twice daily and was advised by home visit physician for persistent seizure to go to the emergency room for for further evaluation.  He received loading fosphenytoin and was started on Depakote and admitted to ICU with EEG monitoring showed no active epileptic focus.  Patient has strong cough and gag reflex unable to protect airway.  Empirically placed on ceftriaxone.  Patient remained stable without any seizure activity and transferred to the floor for further care.    Seizure disorder c/b breakthrough seizures  - Neurology following  - cEEG completed  - Continue with Keppra 750 q12h  - Received fosphenytoin for status  - Continue depakote 250 q6h  - CT head non-con negative  - Continue CTX for potential UTI - f/u urine culture.Pending.     Sinus tachycardia.   On tele.EKG with sinus tachy. Given IV fluid bolus.  Added low BB. Previously had hx of sinus bradycardia. Seen by cardiology and pacemaker was refused given poor functional status.        Vascular dementia c/b acute on chronic deconditioning  - Palliative consulted.  - per family, patient has very limited quality of life at baseline and he has been declining significantly and failing to thrive in last one month.   On ASA and statin.     Monitor for refeeding. Nutrition consult.  Speech therapy consulted. Diet puree with thin liquid. Supervised feeding.   Continue with supplement. On PPI.      Severe protein calorie malnutrition with cachexia and hypoalbuminemia    Hypernatremia:   Improved hypernatremia         18.5 - 24.9 Normal weight / Body mass index is 19.3 kg/m².    Estimated

## 2024-12-22 NOTE — PLAN OF CARE
Problem: Safety - Adult  Goal: Free from fall injury  Outcome: Progressing     Problem: Chronic Conditions and Co-morbidities  Goal: Patient's chronic conditions and co-morbidity symptoms are monitored and maintained or improved  Outcome: Progressing     Problem: Discharge Planning  Goal: Discharge to home or other facility with appropriate resources  Outcome: Progressing     Problem: Pain  Goal: Verbalizes/displays adequate comfort level or baseline comfort level  Outcome: Progressing  Flowsheets (Taken 12/21/2024 1833 by Ronaldo Cooley RN)  Verbalizes/displays adequate comfort level or baseline comfort level: Encourage patient to monitor pain and request assistance     Problem: Skin/Tissue Integrity  Goal: Absence of new skin breakdown  Description: 1.  Monitor for areas of redness and/or skin breakdown  2.  Assess vascular access sites hourly  3.  Every 4-6 hours minimum:  Change oxygen saturation probe site  4.  Every 4-6 hours:  If on nasal continuous positive airway pressure, respiratory therapy assess nares and determine need for appliance change or resting period.  Outcome: Progressing     Problem: ABCDS Injury Assessment  Goal: Absence of physical injury  Outcome: Progressing     Problem: Neurosensory - Adult  Goal: Achieves stable or improved neurological status  Outcome: Progressing  Goal: Absence of seizures  Outcome: Progressing  Goal: Remains free of injury related to seizures activity  Outcome: Progressing  Goal: Achieves maximal functionality and self care  Outcome: Progressing     Problem: Skin/Tissue Integrity - Adult  Goal: Skin integrity remains intact  Outcome: Progressing  Goal: Incisions, wounds, or drain sites healing without S/S of infection  Outcome: Progressing  Goal: Oral mucous membranes remain intact  Outcome: Progressing     Problem: Infection - Adult  Goal: Absence of infection at discharge  Outcome: Progressing  Goal: Absence of infection during hospitalization  Outcome:

## 2024-12-22 NOTE — PLAN OF CARE
Problem: Safety - Adult  Goal: Free from fall injury  12/22/2024 1015 by Ronaldo Cooley RN  Outcome: Progressing  12/22/2024 0048 by Sary Connor RN  Outcome: Progressing     Problem: Chronic Conditions and Co-morbidities  Goal: Patient's chronic conditions and co-morbidity symptoms are monitored and maintained or improved  12/22/2024 1015 by Ronaldo Cooley RN  Outcome: Progressing  Flowsheets (Taken 12/22/2024 0714)  Care Plan - Patient's Chronic Conditions and Co-Morbidity Symptoms are Monitored and Maintained or Improved: Monitor and assess patient's chronic conditions and comorbid symptoms for stability, deterioration, or improvement  12/22/2024 0048 by Sary Connor RN  Outcome: Progressing     Problem: Discharge Planning  Goal: Discharge to home or other facility with appropriate resources  12/22/2024 1015 by Ronaldo Cooley RN  Outcome: Progressing  Flowsheets (Taken 12/22/2024 0714)  Discharge to home or other facility with appropriate resources: Identify barriers to discharge with patient and caregiver  12/22/2024 0048 by Sary Connor RN  Outcome: Progressing     Problem: Pain  Goal: Verbalizes/displays adequate comfort level or baseline comfort level  12/22/2024 1015 by Ronaldo Cooley RN  Outcome: Progressing  Flowsheets (Taken 12/22/2024 0714)  Verbalizes/displays adequate comfort level or baseline comfort level: Encourage patient to monitor pain and request assistance  12/22/2024 0048 by Sary Connor RN  Outcome: Progressing  Flowsheets (Taken 12/21/2024 1833 by Ronaldo Cooley RN)  Verbalizes/displays adequate comfort level or baseline comfort level: Encourage patient to monitor pain and request assistance     Problem: Skin/Tissue Integrity  Goal: Absence of new skin breakdown  Description: 1.  Monitor for areas of redness and/or skin breakdown  2.  Assess vascular access sites hourly  3.  Every 4-6 hours minimum:  Change oxygen saturation probe site  4.  Every  Progressing  Goal: Absence of fever/infection during anticipated neutropenic period  12/22/2024 1015 by Ronaldo Cooley RN  Outcome: Progressing  Flowsheets (Taken 12/22/2024 0714)  Absence of fever/infection during anticipated neutropenic period: Monitor white blood cell count  12/22/2024 0048 by Sary Connor RN  Outcome: Progressing     Problem: Nutrition Deficit:  Goal: Optimize nutritional status  12/22/2024 1015 by Ronaldo Cooley RN  Outcome: Progressing  12/22/2024 0048 by Sary Connor RN  Outcome: Progressing

## 2024-12-22 NOTE — PROGRESS NOTES
1730: TRANSFER - IN REPORT:    Verbal report received from Shanthi Burleson Jr.  being received from CCU for routine progression of patient care      Report consisted of patient's Situation, Background, Assessment and   Recommendations(SBAR).     Information from the following report(s) Nurse Handoff Report, Index, ED Encounter Summary, ED SBAR, Adult Overview, Intake/Output, MAR, Recent Results, and Cardiac Rhythm NSR  was reviewed with the receiving nurse.    Opportunity for questions and clarification was provided.      Assessment completed upon patient's arrival to unit and care assumed.      1900: End of Shift Note    Bedside shift change report given to Sary (oncoming nurse) by Ronaldo Cooley RN (offgoing nurse).  Report included the following information SBAR, Kardex, ED Summary, Intake/Output, MAR, Recent Results, and Cardiac Rhythm NSR    Shift worked:  5623-4693     Shift summary and any significant changes:     See above note     Concerns for physician to address:  N/A     Zone phone for oncoming shift:          Activity:     Number times ambulated in hallways past shift: 0  Number of times OOB to chair past shift: 0    Cardiac:   Cardiac Monitoring: Yes      Cardiac Rhythm: Sinus rhythm    Access:  Current line(s): PIV     Genitourinary:   Urinary Status: Voiding, Incontinent    Respiratory:   O2 Device: None (Room air)  Chronic home O2 use?: NO  Incentive spirometer at bedside: NO    GI:  Last BM (including prior to admit):  (PTA)  Current diet:  ADULT TUBE FEEDING; Nasogastric; Standard with Fiber; Continuous; 10; Yes; 5; Q 12 hours; 45; 200; Q 4 hours  DIET ONE TIME MESSAGE;  ADULT DIET; Dysphagia - Pureed  DIET ONE TIME MESSAGE;  DIET ONE TIME MESSAGE;  Passing flatus: YES    Pain Management:   Patient states pain is manageable on current regimen: N/A    Skin:  Tomas Scale Score: 10  Interventions: Wound Offloading (Prevention Methods): Bed, pressure redistribution/air, Bed, pressure

## 2024-12-22 NOTE — PROGRESS NOTES
SLP Contact Note    SLP paged regarding this patient. RN states that the patient had an episode of intermittent gagging with subsequent coughing with PO intake. O2 remained at 99% but patient became tachycardic when this occurred. RN performed suctioning and it appeared that there was some food that was suctioned. Recommend holding PO for now except for essential medications in applesauce, and SLP will re-evaluate tomorrow as patient and SLP schedule permit.      Shanthi Mendez M.Ed, PhD(c), CCC-SLP (pronouns: she/her/hers)  Speech-Language Pathologist

## 2024-12-22 NOTE — PROGRESS NOTES
0720: Bedside and Verbal shift change report given to Ronaldo (oncoming nurse) by Sary (offgoing nurse). Report included the following information Nurse Handoff Report, Index, ED Encounter Summary, ED SBAR, Adult Overview, Intake/Output, MAR, Recent Results, and Cardiac Rhythm Sinus Tach .      0836: Pt HR sustaining 130's. 500ml from night shift running. /86. MD notified.     0850: 500ml Bolus ordered per MD.    0930: EKG complete.    0948: Pt HR sustaining 130's and reaching 140's. MD notified.    0955: Pt having episode of coughing with intermittent gaging. Spo2 99% on RA, 's. Pt orally suctioned. MD notified. Speech contacted for reevaluation. Pt placed on NPO.    1340: Speech therapy contacted regarding speech eval. Speech therapy not at [facility] and will see first thing in the AM.   1900: End of Shift Note    Bedside shift change report given to MAG Roche (oncoming nurse) by Ronaldo Cooley RN (offgoing nurse).  Report included the following information SBAR, Kardex, ED Summary, Intake/Output, MAR, Recent Results, and Cardiac Rhythm NSR/ Sinus Tach    Shift worked:  7212-7025     Shift summary and any significant changes:     See above note     Concerns for physician to address:  HR     Zone phone for oncoming shift:        Activity:  Level of Assistance: Dependent, patient does less than 25%  Number times ambulated in hallways past shift: 0  Number of times OOB to chair past shift: 0    Cardiac:   Cardiac Monitoring: Yes      Cardiac Rhythm: Sinus tachy    Access:  Current line(s): PIV     Genitourinary:   Urinary Status: Voiding, External catheter    Respiratory:   O2 Device: None (Room air)  Chronic home O2 use?: NO  Incentive spirometer at bedside: NO    GI:  Last BM (including prior to admit):  (PTA)  Current diet:  Diet NPO  Passing flatus: NO    Pain Management:   Patient states pain is manageable on current regimen: N/A    Skin:  Tomas Scale Score: 13  Interventions: Wound Offloading

## 2024-12-22 NOTE — PROGRESS NOTES
On call provider made aware of pt being tachycardic with rates up to 124. EKG obtained, sinus tachycardia. Pt resting but with constant spontaneous chewing motions of mouth, rapid response nurse made aware and at bedside assisting with labs. Pt's vitals otherwise normal and does not appear to be in distress. Pt now more alert than yesterday.

## 2024-12-23 LAB
ANION GAP SERPL CALC-SCNC: 7 MMOL/L (ref 2–12)
BUN SERPL-MCNC: 25 MG/DL (ref 6–20)
BUN/CREAT SERPL: 17 (ref 12–20)
CALCIUM SERPL-MCNC: 8.7 MG/DL (ref 8.5–10.1)
CHLORIDE SERPL-SCNC: 119 MMOL/L (ref 97–108)
CO2 SERPL-SCNC: 20 MMOL/L (ref 21–32)
CREAT SERPL-MCNC: 1.51 MG/DL (ref 0.7–1.3)
ERYTHROCYTE [DISTWIDTH] IN BLOOD BY AUTOMATED COUNT: 13.9 % (ref 11.5–14.5)
GLUCOSE SERPL-MCNC: 74 MG/DL (ref 65–100)
HCT VFR BLD AUTO: 38.5 % (ref 36.6–50.3)
HGB BLD-MCNC: 12.2 G/DL (ref 12.1–17)
MAGNESIUM SERPL-MCNC: 1.9 MG/DL (ref 1.6–2.4)
MCH RBC QN AUTO: 31 PG (ref 26–34)
MCHC RBC AUTO-ENTMCNC: 31.7 G/DL (ref 30–36.5)
MCV RBC AUTO: 97.7 FL (ref 80–99)
NRBC # BLD: 0 K/UL (ref 0–0.01)
NRBC BLD-RTO: 0 PER 100 WBC
PHOSPHATE SERPL-MCNC: 2.4 MG/DL (ref 2.6–4.7)
PLATELET # BLD AUTO: 209 K/UL (ref 150–400)
PMV BLD AUTO: 12.2 FL (ref 8.9–12.9)
POTASSIUM SERPL-SCNC: 4.1 MMOL/L (ref 3.5–5.1)
RBC # BLD AUTO: 3.94 M/UL (ref 4.1–5.7)
SODIUM SERPL-SCNC: 146 MMOL/L (ref 136–145)
WBC # BLD AUTO: 6 K/UL (ref 4.1–11.1)

## 2024-12-23 PROCEDURE — 80048 BASIC METABOLIC PNL TOTAL CA: CPT

## 2024-12-23 PROCEDURE — 2500000003 HC RX 250 WO HCPCS: Performed by: INTERNAL MEDICINE

## 2024-12-23 PROCEDURE — 2580000003 HC RX 258: Performed by: NURSE PRACTITIONER

## 2024-12-23 PROCEDURE — 36415 COLL VENOUS BLD VENIPUNCTURE: CPT

## 2024-12-23 PROCEDURE — 6360000002 HC RX W HCPCS: Performed by: NURSE PRACTITIONER

## 2024-12-23 PROCEDURE — 2500000003 HC RX 250 WO HCPCS: Performed by: HOSPITALIST

## 2024-12-23 PROCEDURE — 6360000002 HC RX W HCPCS: Performed by: HOSPITALIST

## 2024-12-23 PROCEDURE — 6370000000 HC RX 637 (ALT 250 FOR IP): Performed by: HOSPITALIST

## 2024-12-23 PROCEDURE — 6370000000 HC RX 637 (ALT 250 FOR IP): Performed by: INTERNAL MEDICINE

## 2024-12-23 PROCEDURE — 85027 COMPLETE CBC AUTOMATED: CPT

## 2024-12-23 PROCEDURE — 2500000003 HC RX 250 WO HCPCS: Performed by: PSYCHIATRY & NEUROLOGY

## 2024-12-23 PROCEDURE — 6360000002 HC RX W HCPCS: Performed by: INTERNAL MEDICINE

## 2024-12-23 PROCEDURE — 83735 ASSAY OF MAGNESIUM: CPT

## 2024-12-23 PROCEDURE — 2580000003 HC RX 258: Performed by: INTERNAL MEDICINE

## 2024-12-23 PROCEDURE — 2060000000 HC ICU INTERMEDIATE R&B

## 2024-12-23 PROCEDURE — 92610 EVALUATE SWALLOWING FUNCTION: CPT

## 2024-12-23 PROCEDURE — 2580000003 HC RX 258: Performed by: PSYCHIATRY & NEUROLOGY

## 2024-12-23 PROCEDURE — 84100 ASSAY OF PHOSPHORUS: CPT

## 2024-12-23 RX ORDER — DEXTROSE MONOHYDRATE 50 MG/ML
INJECTION, SOLUTION INTRAVENOUS CONTINUOUS
Status: DISCONTINUED | OUTPATIENT
Start: 2024-12-23 | End: 2024-12-25

## 2024-12-23 RX ADMIN — METOCLOPRAMIDE 5 MG: 5 INJECTION, SOLUTION INTRAMUSCULAR; INTRAVENOUS at 12:26

## 2024-12-23 RX ADMIN — HEPARIN SODIUM 5000 UNITS: 5000 INJECTION INTRAVENOUS; SUBCUTANEOUS at 13:53

## 2024-12-23 RX ADMIN — LEVETIRACETAM 750 MG: 100 INJECTION INTRAVENOUS at 10:36

## 2024-12-23 RX ADMIN — METOCLOPRAMIDE 5 MG: 5 INJECTION, SOLUTION INTRAMUSCULAR; INTRAVENOUS at 04:17

## 2024-12-23 RX ADMIN — METOPROLOL TARTRATE 5 MG: 5 INJECTION INTRAVENOUS at 16:13

## 2024-12-23 RX ADMIN — SODIUM CHLORIDE, PRESERVATIVE FREE 10 ML: 5 INJECTION INTRAVENOUS at 08:21

## 2024-12-23 RX ADMIN — Medication 1 AMPULE: at 21:48

## 2024-12-23 RX ADMIN — VALPROATE SODIUM 250 MG: 100 INJECTION, SOLUTION INTRAVENOUS at 08:15

## 2024-12-23 RX ADMIN — DEXTROSE MONOHYDRATE: 50 INJECTION, SOLUTION INTRAVENOUS at 08:13

## 2024-12-23 RX ADMIN — VALPROATE SODIUM 250 MG: 100 INJECTION, SOLUTION INTRAVENOUS at 14:39

## 2024-12-23 RX ADMIN — HEPARIN SODIUM 5000 UNITS: 5000 INJECTION INTRAVENOUS; SUBCUTANEOUS at 21:47

## 2024-12-23 RX ADMIN — METOCLOPRAMIDE 5 MG: 5 INJECTION, SOLUTION INTRAMUSCULAR; INTRAVENOUS at 21:47

## 2024-12-23 RX ADMIN — SODIUM CHLORIDE, PRESERVATIVE FREE 10 ML: 5 INJECTION INTRAVENOUS at 21:47

## 2024-12-23 RX ADMIN — HEPARIN SODIUM 5000 UNITS: 5000 INJECTION INTRAVENOUS; SUBCUTANEOUS at 05:59

## 2024-12-23 RX ADMIN — VALPROATE SODIUM 250 MG: 100 INJECTION, SOLUTION INTRAVENOUS at 19:37

## 2024-12-23 RX ADMIN — LEVETIRACETAM 750 MG: 100 INJECTION INTRAVENOUS at 21:46

## 2024-12-23 RX ADMIN — METOPROLOL TARTRATE 5 MG: 5 INJECTION INTRAVENOUS at 04:17

## 2024-12-23 RX ADMIN — PANTOPRAZOLE SODIUM 40 MG: 40 INJECTION, POWDER, FOR SOLUTION INTRAVENOUS at 08:15

## 2024-12-23 RX ADMIN — METOPROLOL TARTRATE 5 MG: 5 INJECTION INTRAVENOUS at 21:47

## 2024-12-23 RX ADMIN — METOPROLOL TARTRATE 5 MG: 5 INJECTION INTRAVENOUS at 10:36

## 2024-12-23 RX ADMIN — CEFTAZIDIME 2000 MG: 1 INJECTION, POWDER, FOR SOLUTION INTRAMUSCULAR; INTRAVENOUS at 13:41

## 2024-12-23 ASSESSMENT — PAIN SCALES - GENERAL: PAINLEVEL_OUTOF10: 0

## 2024-12-23 ASSESSMENT — PAIN SCALES - WONG BAKER: WONGBAKER_NUMERICALRESPONSE: NO HURT

## 2024-12-23 NOTE — PLAN OF CARE
Bedsores are also more common in people with feeding tubes.      Patient will benefit from skilled intervention to address the above impairments.     PLAN :  Recommendations and Planned Interventions:  Diet: Puree and thin liquids  --PO only when alert/willing to accept   --Medications crushed in puree (I.e. applesauce), if approved by pharmacy   --Upright all PO intake   --Single bites/sips   --Alternate solids & liquids   --Slow rate  --Oral hygiene 2-3x/day  --1:1 supervision/assistance   --Patient prefers room temperature food/drink     Tips for assisting people with dementia at mealtimes  Environmental modifications  · Ensure mealtimes are relaxed and unhurried; eat together to encourage participation  · Decrease distractions (e.g. turn off the TV, provide only the utensil needed the food item)  · Use smaller plates with color contrast between plate and food to increase attention  Feeding techniques  · Try offering smaller meals more frequently with high-calorie snacks, as needed  · Tastes change; people with dementia may prefer greater taste contrasts (e.g. sweet, spicy)  · Make eye contact and provide verbal cues to start and continue eating  · Maximize use of finger foods  · Assist with hand-over-hand feeding when needed  · Offer one item at a time and ensure bite has been swallowed prior to providing another       Recommend next SLP session: ensure diet tolerance, continued patient/family/staff education     Acute SLP Services: SLP Plan of Care: 1 times/week. Patient's rehabilitation potential is considered to be Poor.  Discharge Recommendations: Continue to assess pending progress     SUBJECTIVE:   Patient lethargic, inconsistently opening eyes during session. Wife present at bedside and requesting to feed patient, stating patient opening mouth like he does when he wants food.     OBJECTIVE:     Past Medical History:   Diagnosis Date    Cholesterolosis     Chronic kidney disease     GERD (gastroesophageal  reflux disease)     Hypercholesterolemia     Hypertension     Kidney disease     Seizure (HCC)     Stroke (HCC)    No past surgical history on file.  Prior Level of Function/Home Situation:   Social/Functional History  Lives With: Spouse, Son  Type of Home: House  Home Layout: One level  Home Access:  (4 colleen)  Home Equipment: Adjustable bed, Mechanical lift, Wheelchair - Manual  Active : No  Patient's  Info: n/a patient home bound    Baseline Assessment:  Current Diet : NPO  Current Liquid Diet : NPO  Prior Dysphagia History: Patient known to SLP services with baseline dementia and baseline puree/thin liquid diet  Patient Complaint: none    Cognitive and Communication Status:  Neurologic State: Lethargic  Orientation Level: Unable to verbalize  Cognition: No command following    Dysphagia:  Oral Assessment:  Oral Motor   Labial:  (patient not following commands to assess)  Oral Hygiene: Xerostomia  Lingual:  (unable to assess as patient not following commands)  Velum: Unable to visualize  P.O. Trials:  PO Trials  Assessment Method(s): Observation  Patient Position: upright in bed  Consistency Presented: Thin;Pureed  How Presented: SLP-fed/Presented;Cup/sip;Spoon  Bolus Acceptance:  (acceptance limited by level of alertness)  Bolus Formation/Control: Impaired  Type of Impairment: Anterior;Spillage;Delayed  Oral Residue: 10-50% of bolus (clears with liquid wash)  Aspiration Signs/Symptoms: None  Pharyngeal Phase Characteristics: No impairment, issues, or problems            Motor Speech:         Language Comprehension and Expression:                   Neuro-Linguistics:                      Voice:       Respiratory Status/Airway:  Room air                         Outcome Measure:  Functional Oral Intake Scale (FOIS): 5--Total oral diet with multiple consistencies but requiring special preparation or compensations    After treatment:   Patient left in no apparent distress in bed, Nursing notified, Caregiver

## 2024-12-23 NOTE — PLAN OF CARE
Problem: Safety - Adult  Goal: Free from fall injury  12/22/2024 2132 by Merry Murphy RN  Outcome: Progressing  12/22/2024 1015 by Ronaldo Cooley RN  Outcome: Progressing     Problem: Chronic Conditions and Co-morbidities  Goal: Patient's chronic conditions and co-morbidity symptoms are monitored and maintained or improved  12/22/2024 2132 by Merry Murphy RN  Outcome: Progressing  12/22/2024 1015 by Ronaldo Cooley RN  Outcome: Progressing  Flowsheets (Taken 12/22/2024 0714)  Care Plan - Patient's Chronic Conditions and Co-Morbidity Symptoms are Monitored and Maintained or Improved: Monitor and assess patient's chronic conditions and comorbid symptoms for stability, deterioration, or improvement     Problem: Discharge Planning  Goal: Discharge to home or other facility with appropriate resources  12/22/2024 2132 by Merry Murphy RN  Outcome: Progressing  12/22/2024 1015 by Ronaldo Cooley RN  Outcome: Progressing  Flowsheets (Taken 12/22/2024 0714)  Discharge to home or other facility with appropriate resources: Identify barriers to discharge with patient and caregiver     Problem: Pain  Goal: Verbalizes/displays adequate comfort level or baseline comfort level  Recent Flowsheet Documentation  Taken 12/22/2024 1546 by Ronaldo Cooley RN  Verbalizes/displays adequate comfort level or baseline comfort level: Encourage patient to monitor pain and request assistance  Taken 12/22/2024 1130 by Ronaldo Cooley RN  Verbalizes/displays adequate comfort level or baseline comfort level: Encourage patient to monitor pain and request assistance  12/22/2024 1015 by Ronaldo Cooley RN  Outcome: Progressing  Flowsheets (Taken 12/22/2024 0714)  Verbalizes/displays adequate comfort level or baseline comfort level: Encourage patient to monitor pain and request assistance     Problem: Skin/Tissue Integrity  Goal: Absence of new skin breakdown  Description: 1.  Monitor for areas of redness

## 2024-12-23 NOTE — PROGRESS NOTES
Attempted to schedule hospital follow up PCP appointment. Office  will contact the patient with appointment information per office protocol. Patient's PCP is a home based and will call the patient to schedule the appt.  [facility] information on AVS for patient resource. Pending patient discharge.

## 2024-12-23 NOTE — PROGRESS NOTES
Physician Progress Note      PATIENT:               SHIV CARMEN  [facility] #:                  [id number]  :                       1945  ADMIT DATE:       2024 9:36 AM  DISCH DATE:  RESPONDING  PROVIDER #:        Anastacio Burnett MD          QUERY TEXT:    Patient admitted with seizures. Noted documentation of Acute Kidney Injury.   \"Creatinine 2.33 with baseline creatinine around 1.6\" Per KDIGO, this is not a   1.5 x increase from baseline.  In order to support the diagnosis of PAXTON,   please include additional clinical indicators in your documentation.? Or   please document if the diagnosis of PAXTON has been ruled out after further   study.  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-bedbound and homebound at baseline  Severe vascular dementia  PAXTON on CKD stage III  Hypernatremia  Hypertension  Sodium 149  Creatinine 2.33 with baseline creatinine around 1.6  Magnesium 2.4  LFTs normal  WBC 13.4 with left shift 84% PMNs  CT head negative acute, old ischemic changes noted    CRITICAL CARE CONSULT -  PAXTON on CKD3  RENAL/ELECTROLYTE/FLUIDS:  Hypernatremia: Likely from decreased PO intake    Cr 2.33 - 2.02 - 1.99 - 1.86-1.78-1.55 - 1.51      Treatment: daily labs, IVF, correct lytes    Defined by Kidney Disease Improving Global Outcomes (KDIGO) clinical practice   guideline for acute kidney injury:  -Increase in SCr by greater than or equal to 0.3 mg/dl within 48 hours; or  -Increase or decrease in SCr to greater than or equal to 1.5 times baseline,   which is known or presumed to have occurred within the prior 7 days; or  -Urine volume < 0.5ml/kg/h for 6 hours.  Options provided:  -- Acute kidney injury evidenced by, Please document evidence as well as a   numerical baseline creatinine, if known.  -- Acute kidney injury

## 2024-12-23 NOTE — CARE COORDINATION
Care Management Initial Assessment       RUR: 17%  Readmission? No  1st IM letter given? Yes       CM spoke with patient's wife to complete initial assessment. Patient is total assist with all ADLs. Patient has an adjustable bed, fady lift, and wheelchair at home. Wife completes all ADLs for patient. Pt and wife also have a son at home that has also suffered from a stroke, however, can complete his own ADLs. Patient is seen by visiting physicians, last seen last week. Goal for patient to return home but wife reported if rehab is needed she would like for patient to go to [facility] as he has been there in the past.        12/23/24 1118   Service Assessment   Patient Orientation Other (see comment)  (vascular dementia)   Cognition Dementia / Early Alzheimer's   History Provided By Spouse   Primary Caregiver Spouse   Accompanied By/Relationship wife   Support Systems Spouse/Significant Other   Patient's Healthcare Decision Maker is: Legal Next of Kin   PCP Verified by CM Yes   Last Visit to PCP Within last 3 months   Prior Functional Level Assistance with the following:;Bathing;Dressing;Toileting;Feeding;Cooking;Housework;Shopping;Mobility   Current Functional Level Assistance with the following:;Mobility;Shopping;Housework;Cooking;Toileting;Feeding;Dressing;Bathing   Can patient return to prior living arrangement Yes   Family able to assist with home care needs: Yes   Would you like for me to discuss the discharge plan with any other family members/significant others, and if so, who? Yes  (wife)   Financial Resources Medicare   Social/Functional History   Lives With Spouse;Son   Type of Home House   Home Layout One level   Home Access   (4 colleen)   Home Equipment Adjustable bed;Mechanical lift;Wheelchair - Manual   Active  No   Patient's  Info n/a patient home bound   Discharge Planning   Patient expects to be discharged to: House   Services At/After Discharge   Mode of Transport at Discharge BLS   Confirm

## 2024-12-23 NOTE — PROGRESS NOTES
Hospitalist Progress Note    NAME: Nehemias Burleson Jr.   :  1945   MRN:  [id number]       Assessment / Plan:    79-year-old male with past medical history of vascular dementia, CKD stage III, chronic right hemispheric infarct, seizures on Keppra, bedbound status at baseline admitted with increased seizure activity from home.  Patient Keppra was recently increased to 750 twice daily and was advised by home visit physician for persistent seizure to go to the emergency room for for further evaluation.  He received loading fosphenytoin and was started on Depakote and admitted to ICU with EEG monitoring showed no active epileptic focus.  Patient has strong cough and gag reflex unable to protect airway.  Empirically placed on ceftriaxone.  Patient remained stable without any seizure activity and transferred to the floor for further care.    Seizure disorder c/b breakthrough seizures  - Neurology following  - cEEG completed  - Continue with Keppra 750 q12h  - Received fosphenytoin for status  - Continue depakote 250 q6h  - CT head non-con negative  - Continue CTX for potential UTI -urine culture with Klebsiella CRE Acetobacter.  Antibiotic changed from ceftriaxone to ceftazidime.    Sinus tachycardia.   On tele.EKG with sinus tachy. Given IV fluid bolus.  Added low BB. Previously had hx of sinus bradycardia. Seen by cardiology and pacemaker was refused given poor functional status.        Vascular dementia c/b acute on chronic deconditioning  - Palliative consulted.  - per family, patient has very limited quality of life at baseline and he has been declining significantly and failing to thrive in last one month.   On ASA and statin.     Monitor for refeeding. Nutrition consult.  Speech therapy consulted. Diet puree with thin liquid. Supervised feeding.   Continue with supplement. On PPI.   Calorie count.  Patient previously had PEG tube that was removed in rehab.  Oral intake remains poor with need discussion on PEG

## 2024-12-24 LAB
ANION GAP SERPL CALC-SCNC: 7 MMOL/L (ref 2–12)
BUN SERPL-MCNC: 19 MG/DL (ref 6–20)
BUN/CREAT SERPL: 14 (ref 12–20)
CALCIUM SERPL-MCNC: 8.6 MG/DL (ref 8.5–10.1)
CHLORIDE SERPL-SCNC: 111 MMOL/L (ref 97–108)
CO2 SERPL-SCNC: 22 MMOL/L (ref 21–32)
CREAT SERPL-MCNC: 1.33 MG/DL (ref 0.7–1.3)
ERYTHROCYTE [DISTWIDTH] IN BLOOD BY AUTOMATED COUNT: 13.7 % (ref 11.5–14.5)
GLUCOSE SERPL-MCNC: 106 MG/DL (ref 65–100)
HCT VFR BLD AUTO: 38.4 % (ref 36.6–50.3)
HGB BLD-MCNC: 12.3 G/DL (ref 12.1–17)
MCH RBC QN AUTO: 31 PG (ref 26–34)
MCHC RBC AUTO-ENTMCNC: 32 G/DL (ref 30–36.5)
MCV RBC AUTO: 96.7 FL (ref 80–99)
NRBC # BLD: 0 K/UL (ref 0–0.01)
NRBC BLD-RTO: 0 PER 100 WBC
PLATELET # BLD AUTO: 197 K/UL (ref 150–400)
PMV BLD AUTO: 11.6 FL (ref 8.9–12.9)
POTASSIUM SERPL-SCNC: 3.7 MMOL/L (ref 3.5–5.1)
RBC # BLD AUTO: 3.97 M/UL (ref 4.1–5.7)
SODIUM SERPL-SCNC: 140 MMOL/L (ref 136–145)
WBC # BLD AUTO: 5.8 K/UL (ref 4.1–11.1)

## 2024-12-24 PROCEDURE — 2500000003 HC RX 250 WO HCPCS: Performed by: PSYCHIATRY & NEUROLOGY

## 2024-12-24 PROCEDURE — 2500000003 HC RX 250 WO HCPCS: Performed by: HOSPITALIST

## 2024-12-24 PROCEDURE — 2580000003 HC RX 258: Performed by: PSYCHIATRY & NEUROLOGY

## 2024-12-24 PROCEDURE — 85027 COMPLETE CBC AUTOMATED: CPT

## 2024-12-24 PROCEDURE — 6360000002 HC RX W HCPCS: Performed by: HOSPITALIST

## 2024-12-24 PROCEDURE — 6370000000 HC RX 637 (ALT 250 FOR IP): Performed by: INTERNAL MEDICINE

## 2024-12-24 PROCEDURE — 80048 BASIC METABOLIC PNL TOTAL CA: CPT

## 2024-12-24 PROCEDURE — 2060000000 HC ICU INTERMEDIATE R&B

## 2024-12-24 PROCEDURE — 6360000002 HC RX W HCPCS: Performed by: INTERNAL MEDICINE

## 2024-12-24 PROCEDURE — 2580000003 HC RX 258: Performed by: NURSE PRACTITIONER

## 2024-12-24 PROCEDURE — 36415 COLL VENOUS BLD VENIPUNCTURE: CPT

## 2024-12-24 PROCEDURE — 2500000003 HC RX 250 WO HCPCS: Performed by: INTERNAL MEDICINE

## 2024-12-24 PROCEDURE — 2580000003 HC RX 258: Performed by: INTERNAL MEDICINE

## 2024-12-24 PROCEDURE — 6370000000 HC RX 637 (ALT 250 FOR IP): Performed by: HOSPITALIST

## 2024-12-24 PROCEDURE — 6360000002 HC RX W HCPCS: Performed by: NURSE PRACTITIONER

## 2024-12-24 RX ORDER — AMLODIPINE BESYLATE 5 MG/1
10 TABLET ORAL DAILY
Status: DISCONTINUED | OUTPATIENT
Start: 2024-12-24 | End: 2024-12-26 | Stop reason: HOSPADM

## 2024-12-24 RX ADMIN — ATORVASTATIN CALCIUM 40 MG: 40 TABLET, FILM COATED ORAL at 09:22

## 2024-12-24 RX ADMIN — VALPROATE SODIUM 250 MG: 100 INJECTION, SOLUTION INTRAVENOUS at 09:21

## 2024-12-24 RX ADMIN — SODIUM CHLORIDE, PRESERVATIVE FREE 10 ML: 5 INJECTION INTRAVENOUS at 19:49

## 2024-12-24 RX ADMIN — POLYETHYLENE GLYCOL 3350 17 G: 17 POWDER, FOR SOLUTION ORAL at 09:54

## 2024-12-24 RX ADMIN — METOCLOPRAMIDE 5 MG: 5 INJECTION, SOLUTION INTRAMUSCULAR; INTRAVENOUS at 14:08

## 2024-12-24 RX ADMIN — VALPROATE SODIUM 250 MG: 100 INJECTION, SOLUTION INTRAVENOUS at 01:47

## 2024-12-24 RX ADMIN — METOPROLOL TARTRATE 5 MG: 5 INJECTION INTRAVENOUS at 11:53

## 2024-12-24 RX ADMIN — Medication 1 AMPULE: at 19:49

## 2024-12-24 RX ADMIN — SODIUM CHLORIDE, PRESERVATIVE FREE 10 ML: 5 INJECTION INTRAVENOUS at 09:26

## 2024-12-24 RX ADMIN — CYANOCOBALAMIN TAB 500 MCG 1000 MCG: 500 TAB at 09:21

## 2024-12-24 RX ADMIN — CEFTAZIDIME 2000 MG: 1 INJECTION, POWDER, FOR SOLUTION INTRAMUSCULAR; INTRAVENOUS at 13:44

## 2024-12-24 RX ADMIN — ISOSORBIDE DINITRATE 20 MG: 10 TABLET ORAL at 20:00

## 2024-12-24 RX ADMIN — AMLODIPINE BESYLATE 10 MG: 5 TABLET ORAL at 09:22

## 2024-12-24 RX ADMIN — METOCLOPRAMIDE 5 MG: 5 INJECTION, SOLUTION INTRAMUSCULAR; INTRAVENOUS at 04:20

## 2024-12-24 RX ADMIN — METOPROLOL TARTRATE 5 MG: 5 INJECTION INTRAVENOUS at 04:20

## 2024-12-24 RX ADMIN — HEPARIN SODIUM 5000 UNITS: 5000 INJECTION INTRAVENOUS; SUBCUTANEOUS at 21:28

## 2024-12-24 RX ADMIN — CEFTAZIDIME 2000 MG: 1 INJECTION, POWDER, FOR SOLUTION INTRAMUSCULAR; INTRAVENOUS at 23:44

## 2024-12-24 RX ADMIN — HEPARIN SODIUM 5000 UNITS: 5000 INJECTION INTRAVENOUS; SUBCUTANEOUS at 14:09

## 2024-12-24 RX ADMIN — FOLIC ACID 1000 MCG: 1 TABLET ORAL at 09:22

## 2024-12-24 RX ADMIN — VALPROATE SODIUM 250 MG: 100 INJECTION, SOLUTION INTRAVENOUS at 19:51

## 2024-12-24 RX ADMIN — METOCLOPRAMIDE 5 MG: 5 INJECTION, SOLUTION INTRAMUSCULAR; INTRAVENOUS at 19:49

## 2024-12-24 RX ADMIN — METOPROLOL TARTRATE 5 MG: 5 INJECTION INTRAVENOUS at 22:50

## 2024-12-24 RX ADMIN — METOPROLOL TARTRATE 5 MG: 5 INJECTION INTRAVENOUS at 18:39

## 2024-12-24 RX ADMIN — PANTOPRAZOLE SODIUM 40 MG: 40 INJECTION, POWDER, FOR SOLUTION INTRAVENOUS at 09:25

## 2024-12-24 RX ADMIN — DEXTROSE MONOHYDRATE: 50 INJECTION, SOLUTION INTRAVENOUS at 00:55

## 2024-12-24 RX ADMIN — LEVETIRACETAM 750 MG: 100 INJECTION INTRAVENOUS at 11:53

## 2024-12-24 RX ADMIN — FERROUS SULFATE TAB 325 MG (65 MG ELEMENTAL FE) 325 MG: 325 (65 FE) TAB at 09:23

## 2024-12-24 RX ADMIN — ASPIRIN 81 MG: 81 TABLET, CHEWABLE ORAL at 09:24

## 2024-12-24 RX ADMIN — HEPARIN SODIUM 5000 UNITS: 5000 INJECTION INTRAVENOUS; SUBCUTANEOUS at 05:07

## 2024-12-24 RX ADMIN — LEVETIRACETAM 750 MG: 100 INJECTION INTRAVENOUS at 22:53

## 2024-12-24 RX ADMIN — VALPROATE SODIUM 250 MG: 100 INJECTION, SOLUTION INTRAVENOUS at 14:08

## 2024-12-24 RX ADMIN — THIAMINE HCL TAB 100 MG 200 MG: 100 TAB at 09:22

## 2024-12-24 RX ADMIN — ISOSORBIDE DINITRATE 20 MG: 10 TABLET ORAL at 09:21

## 2024-12-24 ASSESSMENT — PAIN SCALES - GENERAL: PAINLEVEL_OUTOF10: 0

## 2024-12-24 NOTE — PROGRESS NOTES
Comprehensive Nutrition Assessment    Type and Reason for Visit:  Reassess    Nutrition Recommendations/Plan:   Please document % meals and supplements consumed in flowsheet I/O's under intake   Continue current diet per SLP: purees and thin liquids  Add Ensure plus , Ensure clear, Magic cup daily  Pt's wife declined additional feeding tube  Daily bowel regimen. No BM documented since admission.     Malnutrition Assessment:  Malnutrition Status:  Severe malnutrition (12/20/24 1418)    Context:  Chronic Illness     Findings of the 6 clinical characteristics of malnutrition:  Energy Intake:  Unable to assess  Weight Loss:  Unable to assess     Body Fat Loss:  Severe body fat loss Triceps, Fat Overlying Ribs, Buccal region   Muscle Mass Loss:  Severe muscle mass loss Thigh (quadriceps), Scapula (trapezius)  Fluid Accumulation:  No fluid accumulation     Strength:  Not Performed    Nutrition Assessment:     Chart reviewed for follow up and case discussed during IDR. Since last RD assessment pt pulled out NGT on 12/21. Pt seen with wife feeding him lunch. Fair intake of breakfast this morning per RN and he had already consumed ~30% of lunch when I visited. Wife stated she is not interested in having another feeding tube placed for pt, which I agree with as it will not decrease risk of aspiration nor will it increase quality or quantity of life. Will add supplements to diet order. Discussed with RN to document PO intake in the flow sheets as attending referenced calorie count. Meal preferences updated in his diet order. Will continue supporting as needed.    Patient Vitals for the past 120 hrs:   PO Meals Eaten (%)   12/24/24 0925 26 - 50%   12/21/24 1559 1 - 25%   12/21/24 1240 1 - 25%     Wt Readings from Last 5 Encounters:   12/20/24 55.9 kg (123 lb 3.8 oz)   07/20/24 57 kg (125 lb 10.6 oz)   ]    Nutrition Related Findings:    Labs: Cr 1.33.   Meds: Lipitor, Iron, Ferrous sulfate, Folic acid, Reglan, Protonix,

## 2024-12-24 NOTE — PROGRESS NOTES
0700: Bedside shift change report given to Alonso Osborne RN   (oncoming nurse) by MAG Sousa (offgoing nurse). Report included the following information Nurse Handoff Report, Index, Adult Overview, Intake/Output, MAR, Recent Results, and Cardiac Rhythm NSR-ST .     End of Shift Note    Bedside shift change report given to MAG Logan (oncoming nurse) by Alonso Osborne RN (offgoing nurse).  Report included the following information SBAR, Kardex, Intake/Output, MAR, Recent Results, and Cardiac Rhythm NSR-ST    Shift worked:  1150-1122     Shift summary and any significant changes:     none     Concerns for physician to address:  none     Zone phone for oncoming shift:   N/a       Activity:  Level of Assistance: Dependent, patient does less than 25%  Number times ambulated in hallways past shift: 0  Number of times OOB to chair past shift: 0    Cardiac:   Cardiac Monitoring: Yes      Cardiac Rhythm: Sinus rhythm, Sinus tachy    Access:  Current line(s): PIV     Genitourinary:   Urinary Status: Voiding, External catheter    Respiratory:   O2 Device: None (Room air)  Chronic home O2 use?: NO  Incentive spirometer at bedside: NO    GI:  Last BM (including prior to admit):  (PTA)  Current diet:  ADULT DIET; Dysphagia - Pureed  Passing flatus: YES    Pain Management:   Patient states pain is manageable on current regimen: YES    Skin:  Tomas Scale Score: 13  Interventions: Wound Offloading (Prevention Methods): Elevate heels, Pillows, Repositioning, Turning    Patient Safety:  Fall Risk: Nursing Judgement-Fall Risk High(Add Comments): Yes  Fall Risk Interventions  Nursing Judgement-Fall Risk High(Add Comments): Yes  Toilet Every 2 Hours-In Advance of Need: Yes  Hourly Visual Checks: Eyes closed, In bed  Fall Visual Posted: Armband, Fall sign posted, Socks  Room Door Open: Yes  Alarm On: Bed  Patient Moved Closer to Nursing Station: No    Active Consults:   IP CONSULT TO TELE-NEUROLOGY  IP CONSULT TO  HOSPITALIST  IP CONSULT TO INTENSIVIST  IP CONSULT TO PALLIATIVE CARE  IP CONSULT TO DIETITIAN    Length of Stay:  Expected LOS: 7  Actual LOS: 5    Alonso Osborne RN

## 2024-12-24 NOTE — PLAN OF CARE
Problem: Safety - Adult  Goal: Free from fall injury  12/24/2024 1255 by Stefanie Osborne RN  Outcome: Progressing  Flowsheets (Taken 12/24/2024 1255)  Free From Fall Injury: Instruct family/caregiver on patient safety  12/24/2024 1255 by Stefanie Osborne RN  Outcome: Progressing  Flowsheets (Taken 12/24/2024 1255)  Free From Fall Injury: Instruct family/caregiver on patient safety  12/24/2024 0120 by Merry Murphy RN  Outcome: Progressing     Problem: Chronic Conditions and Co-morbidities  Goal: Patient's chronic conditions and co-morbidity symptoms are monitored and maintained or improved  12/24/2024 1255 by Stefanie Osborne RN  Outcome: Progressing  Flowsheets (Taken 12/23/2024 1940 by Merry Murphy RN)  Care Plan - Patient's Chronic Conditions and Co-Morbidity Symptoms are Monitored and Maintained or Improved:   Monitor and assess patient's chronic conditions and comorbid symptoms for stability, deterioration, or improvement   Collaborate with multidisciplinary team to address chronic and comorbid conditions and prevent exacerbation or deterioration  12/24/2024 0120 by Merry Murphy RN  Outcome: Progressing  Flowsheets (Taken 12/23/2024 1940)  Care Plan - Patient's Chronic Conditions and Co-Morbidity Symptoms are Monitored and Maintained or Improved:   Monitor and assess patient's chronic conditions and comorbid symptoms for stability, deterioration, or improvement   Collaborate with multidisciplinary team to address chronic and comorbid conditions and prevent exacerbation or deterioration     Problem: Discharge Planning  Goal: Discharge to home or other facility with appropriate resources  12/24/2024 1255 by Stefanie Osborne RN  Outcome: Progressing  Flowsheets (Taken 12/23/2024 1940 by Merry Murphy RN)  Discharge to home or other facility with appropriate resources:   Identify barriers to discharge with patient and caregiver   Arrange for needed discharge resources and

## 2024-12-24 NOTE — PROGRESS NOTES
declined at this time.     Hyponatremia.  Started on D5 water.     Severe protein calorie malnutrition with cachexia and hypoalbuminemia    Hypernatremia:   Improved hypernatremia         18.5 - 24.9 Normal weight / Body mass index is 19.3 kg/m².    Estimated discharge date: December 23  Barriers:    Code status: Full  Prophylaxis: Lovenox  Recommended Disposition: Home w/Family     Subjective:     Chief Complaint / Reason for Physician Visit  \"Admitted for seizure. ICU for airway protection. Remain seziure free. Transfer out of ICU. Non verbal. Seems more alert today. Had sinus tachy. Speech evaluation complete.  \".  Discussed with RN events overnight.     Review of Systems:  Symptom Y/N Comments  Symptom Y/N Comments   Fever/Chills    Chest Pain     Poor Appetite    Edema     Cough    Abdominal Pain     Sputum    Joint Pain     SOB/TRAORE    Pruritis/Rash     Nausea/vomit    Tolerating PT/OT     Diarrhea    Tolerating Diet     Constipation    Other       Could NOT obtain due to: Mental status.      Objective:     VITALS:   Last 24hrs VS reviewed since prior progress note. Most recent are:  [unfilled]    Intake/Output Summary (Last 24 hours) at 12/24/2024 1346  Last data filed at 12/24/2024 0925  Gross per 24 hour   Intake 320 ml   Output 500 ml   Net -180 ml        I had a face to face encounter and independently examined this patient on 12/24/2024, as outlined below:  PHYSICAL EXAM:  General: WD, WN. Alert, cooperative, no acute distress    EENT:  EOMI. Anicteric sclerae. MMM  Resp:  CTA bilaterally, no wheezing or rales.  No accessory muscle use  CV:  Regular  rhythm,  No edema  GI:  Soft, Non distended, Non tender.  +Bowel sounds  Neurologic:  Alert and oriented X 2, post stroke with contracture.   Psych:   Good insight. Not anxious nor agitated  Skin:  No rashes.  No jaundice    Reviewed most current lab test results and cultures  YES  Reviewed most current radiology test results   YES  Review and summation of old

## 2024-12-24 NOTE — PLAN OF CARE
Problem: Safety - Adult  Goal: Free from fall injury  Outcome: Progressing     Problem: Chronic Conditions and Co-morbidities  Goal: Patient's chronic conditions and co-morbidity symptoms are monitored and maintained or improved  Outcome: Progressing  Flowsheets (Taken 12/23/2024 1940)  Care Plan - Patient's Chronic Conditions and Co-Morbidity Symptoms are Monitored and Maintained or Improved:   Monitor and assess patient's chronic conditions and comorbid symptoms for stability, deterioration, or improvement   Collaborate with multidisciplinary team to address chronic and comorbid conditions and prevent exacerbation or deterioration     Problem: Discharge Planning  Goal: Discharge to home or other facility with appropriate resources  Outcome: Progressing  Flowsheets (Taken 12/23/2024 1940)  Discharge to home or other facility with appropriate resources:   Identify barriers to discharge with patient and caregiver   Arrange for needed discharge resources and transportation as appropriate   Identify discharge learning needs (meds, wound care, etc)     Problem: Skin/Tissue Integrity  Goal: Absence of new skin breakdown  Description: 1.  Monitor for areas of redness and/or skin breakdown  2.  Assess vascular access sites hourly  3.  Every 4-6 hours minimum:  Change oxygen saturation probe site  4.  Every 4-6 hours:  If on nasal continuous positive airway pressure, respiratory therapy assess nares and determine need for appliance change or resting period.  Outcome: Progressing     Problem: ABCDS Injury Assessment  Goal: Absence of physical injury  Outcome: Progressing     Problem: Skin/Tissue Integrity - Adult  Goal: Skin integrity remains intact  Recent Flowsheet Documentation  Taken 12/23/2024 1940 by Merry Murphy RN  Skin Integrity Remains Intact:   Monitor for areas of redness and/or skin breakdown   Assess vascular access sites hourly  Goal: Incisions, wounds, or drain sites healing without S/S of  Bactrim Pregnancy And Lactation Text: This medication is Pregnancy Category D and is known to cause fetal risk.  It is also excreted in breast milk.

## 2024-12-24 NOTE — PROGRESS NOTES
Spiritual Health History and Assessment/Progress Note  [facility]    Attempted Encounter, Palliative Care (pal),  ,  ,      Name: Nehemias Burleson Jr. MRN: [id number]    Age: 79 y.o.     Sex: male   Language: English   Jew: None   Seizure (HCC)     Date: 12/24/2024            Total Time Calculated: 5 min              Spiritual Assessment began in [facility] 2 CARDIAC MEDICAL STEP DOWN        Referral/Consult From: Rounding   Encounter Overview/Reason: Attempted Encounter, Palliative Care (pal)  Service Provided For: Patient not available    Corinna, Belief, Meaning:   Patient unable to assess at this time  Family/Friends No family/friends present      Importance and Influence:  Patient unable to assess at this time  Family/Friends No family/friends present    Community:  Patient Other: Pt soundly sleeping.  Family/Friends No family/friends present    Assessment and Plan of Care:     Patient Interventions include: Other: Left card on tray table.  Family/Friends Interventions include: No family/friends present    Patient Plan of Care: Spiritual Care available upon further referral  Family/Friends Plan of Care: Spiritual Care available upon further referral    Electronically signed by Chaplain Anu on 12/24/2024 at 4:49 PM

## 2024-12-25 LAB
BACTERIA SPEC CULT: NORMAL
BACTERIA SPEC CULT: NORMAL
ERYTHROCYTE [DISTWIDTH] IN BLOOD BY AUTOMATED COUNT: 13.8 % (ref 11.5–14.5)
HCT VFR BLD AUTO: 36.3 % (ref 36.6–50.3)
HGB BLD-MCNC: 11.9 G/DL (ref 12.1–17)
MCH RBC QN AUTO: 31.1 PG (ref 26–34)
MCHC RBC AUTO-ENTMCNC: 32.8 G/DL (ref 30–36.5)
MCV RBC AUTO: 94.8 FL (ref 80–99)
NRBC # BLD: 0 K/UL (ref 0–0.01)
NRBC BLD-RTO: 0 PER 100 WBC
PLATELET # BLD AUTO: 205 K/UL (ref 150–400)
PMV BLD AUTO: 12 FL (ref 8.9–12.9)
RBC # BLD AUTO: 3.83 M/UL (ref 4.1–5.7)
SERVICE CMNT-IMP: NORMAL
SERVICE CMNT-IMP: NORMAL
WBC # BLD AUTO: 7.4 K/UL (ref 4.1–11.1)

## 2024-12-25 PROCEDURE — 36415 COLL VENOUS BLD VENIPUNCTURE: CPT

## 2024-12-25 PROCEDURE — 6370000000 HC RX 637 (ALT 250 FOR IP): Performed by: INTERNAL MEDICINE

## 2024-12-25 PROCEDURE — 2500000003 HC RX 250 WO HCPCS: Performed by: HOSPITALIST

## 2024-12-25 PROCEDURE — 2580000003 HC RX 258: Performed by: PSYCHIATRY & NEUROLOGY

## 2024-12-25 PROCEDURE — 6360000002 HC RX W HCPCS: Performed by: NURSE PRACTITIONER

## 2024-12-25 PROCEDURE — 85027 COMPLETE CBC AUTOMATED: CPT

## 2024-12-25 PROCEDURE — 2500000003 HC RX 250 WO HCPCS: Performed by: INTERNAL MEDICINE

## 2024-12-25 PROCEDURE — 2500000003 HC RX 250 WO HCPCS: Performed by: PSYCHIATRY & NEUROLOGY

## 2024-12-25 PROCEDURE — 2580000003 HC RX 258: Performed by: NURSE PRACTITIONER

## 2024-12-25 PROCEDURE — 2580000003 HC RX 258: Performed by: INTERNAL MEDICINE

## 2024-12-25 PROCEDURE — 6360000002 HC RX W HCPCS: Performed by: HOSPITALIST

## 2024-12-25 PROCEDURE — 6370000000 HC RX 637 (ALT 250 FOR IP): Performed by: HOSPITALIST

## 2024-12-25 PROCEDURE — 2060000000 HC ICU INTERMEDIATE R&B

## 2024-12-25 PROCEDURE — 6360000002 HC RX W HCPCS: Performed by: INTERNAL MEDICINE

## 2024-12-25 RX ORDER — METOPROLOL TARTRATE 25 MG/1
25 TABLET, FILM COATED ORAL 2 TIMES DAILY
Status: DISCONTINUED | OUTPATIENT
Start: 2024-12-25 | End: 2024-12-26 | Stop reason: HOSPADM

## 2024-12-25 RX ADMIN — ATORVASTATIN CALCIUM 40 MG: 40 TABLET, FILM COATED ORAL at 08:28

## 2024-12-25 RX ADMIN — HEPARIN SODIUM 5000 UNITS: 5000 INJECTION INTRAVENOUS; SUBCUTANEOUS at 14:21

## 2024-12-25 RX ADMIN — VALPROATE SODIUM 250 MG: 100 INJECTION, SOLUTION INTRAVENOUS at 01:53

## 2024-12-25 RX ADMIN — HEPARIN SODIUM 5000 UNITS: 5000 INJECTION INTRAVENOUS; SUBCUTANEOUS at 21:00

## 2024-12-25 RX ADMIN — METOPROLOL TARTRATE 25 MG: 25 TABLET, FILM COATED ORAL at 14:22

## 2024-12-25 RX ADMIN — METOPROLOL TARTRATE 5 MG: 5 INJECTION INTRAVENOUS at 03:59

## 2024-12-25 RX ADMIN — FERROUS SULFATE TAB 325 MG (65 MG ELEMENTAL FE) 325 MG: 325 (65 FE) TAB at 06:15

## 2024-12-25 RX ADMIN — CEFTAZIDIME 2000 MG: 1 INJECTION, POWDER, FOR SOLUTION INTRAMUSCULAR; INTRAVENOUS at 22:37

## 2024-12-25 RX ADMIN — VALPROATE SODIUM 250 MG: 100 INJECTION, SOLUTION INTRAVENOUS at 21:24

## 2024-12-25 RX ADMIN — METOPROLOL TARTRATE 5 MG: 5 INJECTION INTRAVENOUS at 09:54

## 2024-12-25 RX ADMIN — ISOSORBIDE DINITRATE 20 MG: 10 TABLET ORAL at 14:21

## 2024-12-25 RX ADMIN — ASPIRIN 81 MG: 81 TABLET, CHEWABLE ORAL at 08:29

## 2024-12-25 RX ADMIN — VALPROATE SODIUM 250 MG: 100 INJECTION, SOLUTION INTRAVENOUS at 08:49

## 2024-12-25 RX ADMIN — HEPARIN SODIUM 5000 UNITS: 5000 INJECTION INTRAVENOUS; SUBCUTANEOUS at 06:15

## 2024-12-25 RX ADMIN — SODIUM CHLORIDE, PRESERVATIVE FREE 10 ML: 5 INJECTION INTRAVENOUS at 21:02

## 2024-12-25 RX ADMIN — CEFTAZIDIME 2000 MG: 1 INJECTION, POWDER, FOR SOLUTION INTRAMUSCULAR; INTRAVENOUS at 11:42

## 2024-12-25 RX ADMIN — SODIUM CHLORIDE, PRESERVATIVE FREE 10 ML: 5 INJECTION INTRAVENOUS at 08:30

## 2024-12-25 RX ADMIN — ISOSORBIDE DINITRATE 20 MG: 10 TABLET ORAL at 08:29

## 2024-12-25 RX ADMIN — AMLODIPINE BESYLATE 10 MG: 5 TABLET ORAL at 08:29

## 2024-12-25 RX ADMIN — LEVETIRACETAM 750 MG: 100 INJECTION INTRAVENOUS at 22:48

## 2024-12-25 RX ADMIN — Medication 1 AMPULE: at 21:33

## 2024-12-25 RX ADMIN — CYANOCOBALAMIN TAB 500 MCG 1000 MCG: 500 TAB at 08:28

## 2024-12-25 RX ADMIN — METOCLOPRAMIDE 5 MG: 5 INJECTION, SOLUTION INTRAMUSCULAR; INTRAVENOUS at 04:29

## 2024-12-25 RX ADMIN — LEVETIRACETAM 750 MG: 100 INJECTION INTRAVENOUS at 11:43

## 2024-12-25 RX ADMIN — THIAMINE HCL TAB 100 MG 100 MG: 100 TAB at 08:30

## 2024-12-25 RX ADMIN — ISOSORBIDE DINITRATE 20 MG: 10 TABLET ORAL at 21:01

## 2024-12-25 RX ADMIN — FOLIC ACID 1000 MCG: 1 TABLET ORAL at 08:28

## 2024-12-25 RX ADMIN — METOPROLOL TARTRATE 25 MG: 25 TABLET, FILM COATED ORAL at 21:01

## 2024-12-25 RX ADMIN — METOCLOPRAMIDE 5 MG: 5 INJECTION, SOLUTION INTRAMUSCULAR; INTRAVENOUS at 14:20

## 2024-12-25 RX ADMIN — METOCLOPRAMIDE 5 MG: 5 INJECTION, SOLUTION INTRAMUSCULAR; INTRAVENOUS at 21:01

## 2024-12-25 RX ADMIN — PANTOPRAZOLE SODIUM 40 MG: 40 INJECTION, POWDER, FOR SOLUTION INTRAVENOUS at 08:28

## 2024-12-25 RX ADMIN — VALPROATE SODIUM 250 MG: 100 INJECTION, SOLUTION INTRAVENOUS at 14:20

## 2024-12-25 ASSESSMENT — PAIN SCALES - GENERAL
PAINLEVEL_OUTOF10: 0

## 2024-12-25 ASSESSMENT — PAIN SCALES - WONG BAKER
WONGBAKER_NUMERICALRESPONSE: NO HURT

## 2024-12-25 NOTE — PLAN OF CARE
Problem: Safety - Adult  Goal: Free from fall injury  Outcome: Progressing     Problem: Chronic Conditions and Co-morbidities  Goal: Patient's chronic conditions and co-morbidity symptoms are monitored and maintained or improved  Outcome: Progressing  Flowsheets (Taken 12/24/2024 2000)  Care Plan - Patient's Chronic Conditions and Co-Morbidity Symptoms are Monitored and Maintained or Improved: Monitor and assess patient's chronic conditions and comorbid symptoms for stability, deterioration, or improvement     Problem: Discharge Planning  Goal: Discharge to home or other facility with appropriate resources  Outcome: Progressing  Flowsheets (Taken 12/24/2024 2000)  Discharge to home or other facility with appropriate resources: Identify barriers to discharge with patient and caregiver     Problem: Pain  Goal: Verbalizes/displays adequate comfort level or baseline comfort level  Outcome: Progressing     Problem: ABCDS Injury Assessment  Goal: Absence of physical injury  Outcome: Progressing     Problem: Neurosensory - Adult  Goal: Achieves stable or improved neurological status  Recent Flowsheet Documentation  Taken 12/24/2024 2000 by Doreen Mccracken RN  Achieves stable or improved neurological status: Assess for and report changes in neurological status  Goal: Absence of seizures  Recent Flowsheet Documentation  Taken 12/24/2024 2000 by Doreen Mccracken RN  Absence of seizures: Monitor for seizure activity.  If seizure occurs, document type and location of movements and any associated apnea  Goal: Remains free of injury related to seizures activity  Recent Flowsheet Documentation  Taken 12/24/2024 2000 by Doreen Mccracken RN  Remains free of injury related to seizure activity: Maintain airway, patient safety  and administer oxygen as ordered  Goal: Achieves maximal functionality and self care  Recent Flowsheet Documentation  Taken 12/24/2024 2000 by Doreen Mccracken RN  Achieves maximal functionality and

## 2024-12-25 NOTE — PLAN OF CARE
Problem: Safety - Adult  Goal: Free from fall injury  12/25/2024 1229 by Stefanie Osborne RN  Outcome: Progressing  Flowsheets (Taken 12/25/2024 1229)  Free From Fall Injury: Instruct family/caregiver on patient safety  12/25/2024 0534 by Doreen Mccracken RN  Outcome: Progressing     Problem: Chronic Conditions and Co-morbidities  Goal: Patient's chronic conditions and co-morbidity symptoms are monitored and maintained or improved  12/25/2024 1229 by Stefanie Osborne RN  Outcome: Progressing  Flowsheets (Taken 12/24/2024 2000 by Doreen Mccracken RN)  Care Plan - Patient's Chronic Conditions and Co-Morbidity Symptoms are Monitored and Maintained or Improved: Monitor and assess patient's chronic conditions and comorbid symptoms for stability, deterioration, or improvement  12/25/2024 0534 by Doreen Mccracken RN  Outcome: Progressing  Flowsheets (Taken 12/24/2024 2000)  Care Plan - Patient's Chronic Conditions and Co-Morbidity Symptoms are Monitored and Maintained or Improved: Monitor and assess patient's chronic conditions and comorbid symptoms for stability, deterioration, or improvement     Problem: Discharge Planning  Goal: Discharge to home or other facility with appropriate resources  12/25/2024 1229 by Stefanie Osborne RN  Outcome: Progressing  Flowsheets (Taken 12/24/2024 2000 by Doreen Mccracken RN)  Discharge to home or other facility with appropriate resources: Identify barriers to discharge with patient and caregiver  12/25/2024 0534 by Doreen Mccracken RN  Outcome: Progressing  Flowsheets (Taken 12/24/2024 2000)  Discharge to home or other facility with appropriate resources: Identify barriers to discharge with patient and caregiver     Problem: Pain  Goal: Verbalizes/displays adequate comfort level or baseline comfort level  12/25/2024 1229 by Stefanie Osborne RN  Outcome: Progressing  Flowsheets (Taken 12/22/2024 1546 by Ronaldo Cooley RN)  Verbalizes/displays adequate comfort level

## 2024-12-25 NOTE — PROGRESS NOTES
0700: Bedside and Verbal shift change report given to Alonso Osborne RN   (oncoming nurse) by MAG Logan (offgoing nurse). Report included the following information Nurse Handoff Report, Index, Adult Overview, Intake/Output, MAR, Recent Results, and Cardiac Rhythm NSR-ST .     End of Shift Note    Bedside shift change report given to MAG Castaneda (oncoming nurse) by Alonso Osborne RN (offgoing nurse).  Report included the following information SBAR, Kardex, Intake/Output, MAR, Recent Results, and Cardiac Rhythm NSR-ST    Shift worked:  5483-3034     Shift summary and any significant changes:     Wife to be called prior to patient leaving with discharge instructions     Concerns for physician to address:  none     Zone phone for oncoming shift:   N/a       Activity:  Level of Assistance: Dependent, patient does less than 25%  Number times ambulated in hallways past shift: 0  Number of times OOB to chair past shift: 0    Cardiac:   Cardiac Monitoring: Yes      Cardiac Rhythm: Sinus rhythm, Sinus tachy    Access:  Current line(s): PIV     Genitourinary:   Urinary Status: Voiding, External catheter    Respiratory:   O2 Device: None (Room air)  Chronic home O2 use?: NO  Incentive spirometer at bedside: NO    GI:  Last BM (including prior to admit):  (PTA)  Current diet:  ADULT ORAL NUTRITION SUPPLEMENT; Dinner; Standard High Calorie/High Protein Oral Supplement  ADULT ORAL NUTRITION SUPPLEMENT; Lunch; Frozen Oral Supplement  ADULT ORAL NUTRITION SUPPLEMENT; Breakfast; Clear Liquid Oral Supplement  ADULT DIET; Dysphagia - Pureed; No coffee or tea. Likes juice, pudding, ice cream. Vanilla Ensure.  Passing flatus: YES    Pain Management:   Patient states pain is manageable on current regimen: YES    Skin:  Tomas Scale Score: 14  Interventions: Wound Offloading (Prevention Methods): Bed, pressure reduction mattress, Elevate heels, Pillows, Repositioning, Turning    Patient Safety:  Fall Risk: Nursing

## 2024-12-25 NOTE — PROGRESS NOTES
Hospitalist Progress Note    NAME: Nehemias Burleson Jr.   :  1945   MRN:  [id number]       Assessment / Plan:    79-year-old male with past medical history of vascular dementia, CKD stage III, chronic right hemispheric infarct, seizures on Keppra, bedbound status at baseline admitted with increased seizure activity from home.  Patient Keppra was recently increased to 750 twice daily and was advised by home visit physician for persistent seizure to go to the emergency room for for further evaluation.  He received loading fosphenytoin and was started on Depakote and admitted to ICU with EEG monitoring showed no active epileptic focus.  Patient has strong cough and gag reflex unable to protect airway.  Empirically placed on ceftriaxone.  Patient remained stable without any seizure activity and transferred to the floor for further care.    Seizure disorder c/b breakthrough seizures  - Neurology following  - Continue with Keppra 750 q12h and depakote 250 q6h  - CT head non-con negative  - Continue CTX for potential UTI -urine culture with Klebsiella CRE Acetobacter.  Antibiotic changed from ceftriaxone to ceftazidime. Follow culture sensitivity.  Suspect could be colonization.  Patient remained stable can be discharged on  home with home care.    Sinus tachycardia.   On tele. EKG with sinus tachy. Given IV fluid bolus.  Started on metoprolol 25 mg twice daily.  Watch for bradycardia.  Previously had hx of sinus bradycardia. Seen by cardiology and pacemaker was refused given poor functional status.        Vascular dementia c/b acute on chronic deconditioning  - Palliative consulted.  - per family, patient has very limited quality of life at baseline and he has been declining significantly and failing to thrive in last one month.   On ASA and statin.     Monitor for refeeding. Nutrition consult.  Speech therapy consulted. Diet puree with thin liquid. Supervised feeding.   Continue with supplement. On PPI.  post stroke with contracture.   Psych:   Good insight. Not anxious nor agitated  Skin:  No rashes.  No jaundice    Reviewed most current lab test results and cultures  YES  Reviewed most current radiology test results   YES  Review and summation of old records today    NO  Reviewed patient's current orders and MAR    YES  PMH/SH reviewed - no change compared to H&P  ________________________________________________________________________  Care Plan discussed with:    Comments   Patient x    Family  x    RN x    Care Manager     Consultant  x                      Multidiciplinary team rounds were held today with , nursing, pharmacist and clinical coordinator.  Patient's plan of care was discussed; medications were reviewed and discharge planning was addressed.     ________________________________________________________________________  Total NON critical care TIME:  35   Minutes    Total CRITICAL CARE TIME Spent:   Minutes non procedure based      Comments   >50% of visit spent in counseling and coordination of care     ________________________________________________________________________  Anastacio Burnett MD     Procedures: see electronic medical records for all procedures/Xrays and details which were not copied into this note but were reviewed prior to creation of Plan.      LABS:  I reviewed today's most current labs and imaging studies.  Pertinent labs include:  Recent Labs     12/23/24  0654 12/24/24  0716 12/25/24  0404   WBC 6.0 5.8 7.4   HGB 12.2 12.3 11.9*   HCT 38.5 38.4 36.3*    197 205     Recent Labs     12/23/24  0654 12/24/24  0716   * 140   K 4.1 3.7   * 111*   CO2 20* 22   BUN 25* 19   MG 1.9  --    PHOS 2.4*  --        Signed: Anastacio Burnett MD

## 2024-12-26 ENCOUNTER — TELEPHONE (OUTPATIENT)
Facility: CLINIC | Age: 79
End: 2024-12-26

## 2024-12-26 VITALS
HEIGHT: 67 IN | BODY MASS INDEX: 19.41 KG/M2 | WEIGHT: 123.68 LBS | TEMPERATURE: 98.7 F | OXYGEN SATURATION: 93 % | HEART RATE: 106 BPM | SYSTOLIC BLOOD PRESSURE: 139 MMHG | RESPIRATION RATE: 20 BRPM | DIASTOLIC BLOOD PRESSURE: 84 MMHG

## 2024-12-26 LAB
ANION GAP SERPL CALC-SCNC: 7 MMOL/L (ref 2–12)
BUN SERPL-MCNC: 24 MG/DL (ref 6–20)
BUN/CREAT SERPL: 15 (ref 12–20)
CALCIUM SERPL-MCNC: 8.7 MG/DL (ref 8.5–10.1)
CHLORIDE SERPL-SCNC: 112 MMOL/L (ref 97–108)
CO2 SERPL-SCNC: 23 MMOL/L (ref 21–32)
CREAT SERPL-MCNC: 1.65 MG/DL (ref 0.7–1.3)
GLUCOSE SERPL-MCNC: 89 MG/DL (ref 65–100)
POTASSIUM SERPL-SCNC: 4.3 MMOL/L (ref 3.5–5.1)
SODIUM SERPL-SCNC: 142 MMOL/L (ref 136–145)

## 2024-12-26 PROCEDURE — 6360000002 HC RX W HCPCS: Performed by: INTERNAL MEDICINE

## 2024-12-26 PROCEDURE — 6370000000 HC RX 637 (ALT 250 FOR IP): Performed by: INTERNAL MEDICINE

## 2024-12-26 PROCEDURE — 6360000002 HC RX W HCPCS: Performed by: HOSPITALIST

## 2024-12-26 PROCEDURE — 6370000000 HC RX 637 (ALT 250 FOR IP): Performed by: HOSPITALIST

## 2024-12-26 PROCEDURE — 2500000003 HC RX 250 WO HCPCS: Performed by: PSYCHIATRY & NEUROLOGY

## 2024-12-26 PROCEDURE — 2580000003 HC RX 258: Performed by: INTERNAL MEDICINE

## 2024-12-26 PROCEDURE — 2580000003 HC RX 258: Performed by: NURSE PRACTITIONER

## 2024-12-26 PROCEDURE — 2580000003 HC RX 258: Performed by: PSYCHIATRY & NEUROLOGY

## 2024-12-26 PROCEDURE — 80048 BASIC METABOLIC PNL TOTAL CA: CPT

## 2024-12-26 PROCEDURE — 6360000002 HC RX W HCPCS: Performed by: NURSE PRACTITIONER

## 2024-12-26 PROCEDURE — 36415 COLL VENOUS BLD VENIPUNCTURE: CPT

## 2024-12-26 PROCEDURE — 2500000003 HC RX 250 WO HCPCS: Performed by: HOSPITALIST

## 2024-12-26 RX ORDER — AMLODIPINE BESYLATE 10 MG/1
10 TABLET ORAL DAILY
Qty: 30 TABLET | Refills: 3 | Status: SHIPPED | OUTPATIENT
Start: 2024-12-27

## 2024-12-26 RX ORDER — VALPROIC ACID 250 MG/5ML
250 SOLUTION ORAL 4 TIMES DAILY
Qty: 600 ML | Refills: 0 | Status: SHIPPED | OUTPATIENT
Start: 2024-12-26 | End: 2025-01-25

## 2024-12-26 RX ORDER — METOPROLOL TARTRATE 25 MG/1
25 TABLET, FILM COATED ORAL 2 TIMES DAILY
Qty: 60 TABLET | Refills: 3 | Status: SHIPPED | OUTPATIENT
Start: 2024-12-26

## 2024-12-26 RX ADMIN — VALPROATE SODIUM 250 MG: 100 INJECTION, SOLUTION INTRAVENOUS at 01:49

## 2024-12-26 RX ADMIN — LEVETIRACETAM 750 MG: 100 INJECTION INTRAVENOUS at 10:50

## 2024-12-26 RX ADMIN — ISOSORBIDE DINITRATE 20 MG: 10 TABLET ORAL at 08:39

## 2024-12-26 RX ADMIN — FERROUS SULFATE TAB 325 MG (65 MG ELEMENTAL FE) 325 MG: 325 (65 FE) TAB at 06:48

## 2024-12-26 RX ADMIN — MINERAL SUPPLEMENT IRON 300 MG / 5 ML STRENGTH LIQUID 100 PER BOX UNFLAVORED 300 MG: at 08:39

## 2024-12-26 RX ADMIN — Medication 1 AMPULE: at 08:40

## 2024-12-26 RX ADMIN — THIAMINE HCL TAB 100 MG 100 MG: 100 TAB at 08:39

## 2024-12-26 RX ADMIN — METOPROLOL TARTRATE 25 MG: 25 TABLET, FILM COATED ORAL at 08:39

## 2024-12-26 RX ADMIN — METOCLOPRAMIDE 5 MG: 5 INJECTION, SOLUTION INTRAMUSCULAR; INTRAVENOUS at 04:14

## 2024-12-26 RX ADMIN — SODIUM CHLORIDE, PRESERVATIVE FREE 10 ML: 5 INJECTION INTRAVENOUS at 08:40

## 2024-12-26 RX ADMIN — ATORVASTATIN CALCIUM 40 MG: 40 TABLET, FILM COATED ORAL at 08:40

## 2024-12-26 RX ADMIN — PANTOPRAZOLE SODIUM 40 MG: 40 INJECTION, POWDER, FOR SOLUTION INTRAVENOUS at 08:40

## 2024-12-26 RX ADMIN — AMLODIPINE BESYLATE 10 MG: 5 TABLET ORAL at 08:39

## 2024-12-26 RX ADMIN — CYANOCOBALAMIN TAB 500 MCG 1000 MCG: 500 TAB at 08:39

## 2024-12-26 RX ADMIN — FOLIC ACID 1000 MCG: 1 TABLET ORAL at 08:39

## 2024-12-26 RX ADMIN — VALPROATE SODIUM 250 MG: 100 INJECTION, SOLUTION INTRAVENOUS at 08:42

## 2024-12-26 RX ADMIN — HEPARIN SODIUM 5000 UNITS: 5000 INJECTION INTRAVENOUS; SUBCUTANEOUS at 06:32

## 2024-12-26 RX ADMIN — CEFTAZIDIME 2000 MG: 1 INJECTION, POWDER, FOR SOLUTION INTRAMUSCULAR; INTRAVENOUS at 10:52

## 2024-12-26 RX ADMIN — ASPIRIN 81 MG: 81 TABLET, CHEWABLE ORAL at 08:39

## 2024-12-26 ASSESSMENT — PAIN SCALES - GENERAL
PAINLEVEL_OUTOF10: 0

## 2024-12-26 ASSESSMENT — PAIN SCALES - WONG BAKER: WONGBAKER_NUMERICALRESPONSE: NO HURT

## 2024-12-26 NOTE — DISCHARGE SUMMARY
-- -- -- -- -- 56.1 kg (123 lb 10.9 oz)   12/26/24 0200 (!) 146/86 98.5 °F (36.9 °C) Temporal 87 17 97 % --   12/26/24 0000 117/76 -- -- (!) 101 24 98 % --   12/25/24 2200 138/83 98 °F (36.7 °C) Temporal (!) 105 21 98 % --   12/25/24 2101 (!) 150/82 -- -- (!) 102 -- -- --   12/25/24 2000 (!) 150/82 -- -- (!) 104 23 97 % --   12/25/24 1930 (!) 167/81 98.2 °F (36.8 °C) Axillary (!) 102 23 97 % --   12/25/24 1600 137/82 98 °F (36.7 °C) Temporal (!) 113 13 97 % --   12/25/24 1530 (!) 152/86 -- -- (!) 118 13 96 % --   12/25/24 1422 (!) 153/97 -- -- (!) 110 -- -- --       Gen:    Not in distress  Chest: Clear lungs  CVS:   Regular rhythm.  No edema  Abd:  Soft, not distended, not tender  Neuro: awake, moving all exts    Discharge/Recent Laboratory Results:  Recent Labs     12/26/24  0416      K 4.3   *   CO2 23   BUN 24*   CREATININE 1.65*   GLUCOSE 89   CALCIUM 8.7     Recent Labs     12/25/24  0404   HGB 11.9*   HCT 36.3*   WBC 7.4          Discharge Medications:     Medication List        START taking these medications      amLODIPine 10 MG tablet  Commonly known as: NORVASC  Take 1 tablet by mouth daily  Start taking on: December 27, 2024     metoprolol tartrate 25 MG tablet  Commonly known as: LOPRESSOR  Take 1 tablet by mouth 2 times daily     valproic acid 250 MG/5ML Soln oral solution  Commonly known as: DEPAKENE  Take 5 mLs by mouth in the morning, at noon, in the evening, and at bedtime            CHANGE how you take these medications      levETIRAcetam 100 MG/ML oral solution  Commonly known as: KEPPRA  GIVE 5 ML BY MOUTH TWO TIMES DAILY  What changed: See the new instructions.            CONTINUE taking these medications      aspirin 81 MG chewable tablet     atorvastatin 40 MG tablet  Commonly known as: LIPITOR  TAKE 1 TABLET BY MOUTH EVERY DAY     cyanocobalamin 1000 MCG tablet  Take 1 tablet by mouth daily     ferrous sulfate 325 (65 Fe) MG tablet  Commonly known as: IRON 325  TAKE 1

## 2024-12-26 NOTE — TELEPHONE ENCOUNTER
[facility] Primary Care at Home ([facility])   Phone:  [phone]      Fax:  [phone] [address], [address]  [place] [address]    Name:  Nehemias Burleson Jr.  YOB: 1945    Returned call to Lillian Burleson to schedule Nehemias Benjy's one month  Primary Care at Home follow up appointment with Dr. Desean Robin.  She states that April from Primary Care at Home has already called her to schedule the appointment for 12/30/24.      Dulce Robin RN, Gerontological Nursing-BC, PN

## 2024-12-26 NOTE — TELEPHONE ENCOUNTER
Care Transitions Initial Follow Up Call    Outreach made within 2 business days of discharge: Yes    Patient: Nehemias Burleson Jr. Patient : 1945   MRN: [id number]  Reason for Admission: Status Epilepticus  Discharge Date: 24       Spoke with: wife Lillian    Discharge department/facility: [facility]    TCM Interactive Patient Contact:  Was patient able to fill all prescriptions: Yes  Was patient instructed to bring all medications to the follow-up visit: Yes  Is patient taking all medications as directed in the discharge summary? Yes  Does patient understand their discharge instructions: Yes  Does patient have questions or concerns that need addressed prior to 7-14 day follow up office visit: no    Additional needs identified to be addressed with provider  Discussed all new discharge meds with Lillian and answered her questions.  Son had just brought in new meds from pharmacy.  Encouraged Lillian to call our office with any questions or concerns.  Appt made for Dr. Robin to see pt for ROBERT on . Lillian advises he can come anytime as she will be up early.             Scheduled appointment with PCP within 7-14 days    Follow Up  Future Appointments   Date Time Provider Department Center   2024  9:00 AM Desean Robin MD [facility] AM[facility]       JESUS GROVE RN

## 2024-12-26 NOTE — PROGRESS NOTES
DISCHARGE SUMMARY FROM [facility] NURSE    The patient is stable for discharge. I have reviewed the discharge instructions with the spouse. The spouse verbalized understanding. All questions were fully answered. The spouse verbalized no complaints.    Hard scripts and medication handouts were given and reviewed with the spouse. Appropriate educational materials and medication side effects teaching were also provided.    Cardiac monitor and IV line(s) were removed.     There were no personal belongings, valuables or home medications left at patient's bedside,  or safe.     Liz Rod RN, 12/26/2024 1:17 PM

## 2024-12-26 NOTE — PLAN OF CARE
Problem: Safety - Adult  Goal: Free from fall injury  Recent Flowsheet Documentation  Taken 12/26/2024 0936 by Liz Rod RN  Free From Fall Injury:   Instruct family/caregiver on patient safety   Based on caregiver fall risk screen, instruct family/caregiver to ask for assistance with transferring infant if caregiver noted to have fall risk factors     Problem: Chronic Conditions and Co-morbidities  Goal: Patient's chronic conditions and co-morbidity symptoms are monitored and maintained or improved  Recent Flowsheet Documentation  Taken 12/26/2024 0845 by Liz Rod RN  Care Plan - Patient's Chronic Conditions and Co-Morbidity Symptoms are Monitored and Maintained or Improved:   Monitor and assess patient's chronic conditions and comorbid symptoms for stability, deterioration, or improvement   Collaborate with multidisciplinary team to address chronic and comorbid conditions and prevent exacerbation or deterioration   Update acute care plan with appropriate goals if chronic or comorbid symptoms are exacerbated and prevent overall improvement and discharge     Problem: Discharge Planning  Goal: Discharge to home or other facility with appropriate resources  Recent Flowsheet Documentation  Taken 12/26/2024 0845 by Liz Rod RN  Discharge to home or other facility with appropriate resources:   Identify barriers to discharge with patient and caregiver   Arrange for needed discharge resources and transportation as appropriate   Identify discharge learning needs (meds, wound care, etc)     Problem: Pain  Goal: Verbalizes/displays adequate comfort level or baseline comfort level  Recent Flowsheet Documentation  Taken 12/26/2024 0730 by Liz Rod RN  Verbalizes/displays adequate comfort level or baseline comfort level:   Implement non-pharmacological measures as appropriate and evaluate response   Consider cultural and social influences on pain and pain management   Administer analgesics based  patient fatigue and changes in neurological status   Encourage and assist patient to increase activity and self care with guidance from physical therapy/occupational therapy   Encourage visually impaired, hearing impaired and aphasic patients to use assistive/communication devices     Problem: Skin/Tissue Integrity - Adult  Goal: Skin integrity remains intact  Recent Flowsheet Documentation  Taken 12/26/2024 0936 by Liz Rod RN  Skin Integrity Remains Intact:   Monitor for areas of redness and/or skin breakdown   Assess vascular access sites hourly   Every 4-6 hours minimum: Change oxygen saturation probe site  Taken 12/26/2024 0845 by Liz Rod RN  Skin Integrity Remains Intact:   Every 4-6 hours minimum: Change oxygen saturation probe site   Every 4-6 hours: If on nasal continuous positive airway pressure, respiratory therapy assesses nares and determine need for appliance change or resting period   Assess vascular access sites hourly   Monitor for areas of redness and/or skin breakdown  Goal: Incisions, wounds, or drain sites healing without S/S of infection  Recent Flowsheet Documentation  Taken 12/26/2024 0936 by Liz Rod RN  Incisions, Wounds, or Drain Sites Healing Without Sign and Symptoms of Infection:   ADMISSION and DAILY: Assess and document risk factors for pressure ulcer development   TWICE DAILY: Assess and document dressing/incision, wound bed, drain sites and surrounding tissue   Implement wound care per orders   TWICE DAILY: Assess and document skin integrity  Taken 12/26/2024 0845 by Liz Rod RN  Incisions, Wounds, or Drain Sites Healing Without Sign and Symptoms of Infection:   ADMISSION and DAILY: Assess and document risk factors for pressure ulcer development   TWICE DAILY: Assess and document skin integrity   Implement wound care per orders   TWICE DAILY: Assess and document dressing/incision, wound bed, drain sites and surrounding tissue  Goal: Oral mucous

## 2024-12-26 NOTE — DISCHARGE INSTRUCTIONS
HOSPITALIST DISCHARGE INSTRUCTIONS    NAME: Nehemias Burleson Jr.   :  1945   MRN:  [id number]     Date/Time:  2024 1:11 PM    ADMIT DATE: 2024     DISCHARGE DATE: 2024     DISCHARGE DIAGNOSIS:  Seizure disorder with breakthrough seizures POA- depakote added ,cont Keppra per Neuro recc  Sinus tachycardia- controlled with low dose Metoprolol- added on discharge  HTN- Amlodipine added  Vascular dementia c/b acute on chronic deconditioning POA- wife still wants pt home with HH, not ready for Hospice yet, s/p palliative consult this admission  Severe protein calorie malnutrition with cachexia and hypoalbuminemia  Hypernatremia  Full code    MEDICATIONS:  As per medication reconciliation  list  It is important that you take the medication exactly as they are prescribed.   Keep your medication in the bottles provided by the pharmacist and keep a list of the medication names, dosages, and times to be taken in your wallet.   Do not take other medications without consulting your doctor.     Pain Management: per above medications    What to do at Home    Recommended diet:  Dysphagia diet    Recommended activity: activity as tolerated    If you have questions regarding the hospital related prescriptions or hospital related issues please call at .    If you experience any of the following symptoms then please call your primary care physician or return to the emergency room if you cannot get hold of your doctor:  Fever, chills, nausea, vomiting, diarrhea, change in mentation, falling, bleeding, shortness of breath,     Follow Up:  Visiting PCP you are to call and set up an appointment to see them in 7-10 days.      Information obtained by :  I understand that if any problems occur once I am at home I am to contact my physician.    I understand and acknowledge receipt of the instructions indicated above.

## 2024-12-26 NOTE — CARE COORDINATION
Pt is cleared for d/c from a CM standpoint.        12/26/24 1137   Services At/After Discharge   Transition of Care Consult (CM Consult) Discharge Planning   Services At/After Discharge None   Mode of Transport at Discharge BLS  (1 p.m.)   Condition of Participation: Discharge Planning   The Patient and/or Patient Representative was provided with a Choice of Provider? Patient Representative   Name of the Patient Representative who was provided with the Choice of Provider and agrees with the Discharge Plan?  Mrs. Burleson   The Patient and/Or Patient Representative agree with the Discharge Plan? Yes   Freedom of Choice list was provided with basic dialogue that supports the patient's individualized plan of care/goals, treatment preferences, and shares the quality data associated with the providers?  Yes         CM acknowledged d/c order.  CM called and spoke with pt's spouse to discuss.  2IM reviewed and emailed to [email or web address].  Transport scheduled for 1 p.m.     Tatiana Fernando LMSW  Supervisee in Social Work  Care Management, [facility]  [phone]

## 2024-12-26 NOTE — PROGRESS NOTES
End of Shift Note    Bedside shift change report given to MAG Hill (oncoming nurse) by Tonya Dewey RN (offgoing nurse).  Report included the following information MAR and Cardiac Rhythm NSR    Shift worked:7P-7A       Shift summary and any significant changes: NONE         Concerns for physician to address:POSSIBLE D/C HOME       Zone phone for oncmarina shift: [phone]         Activity:  Level of Assistance: Dependent, patient does less than 25%  Number times ambulated in hallways past shift: 0  Number of times OOB to chair past shift: 0    Cardiac:   Cardiac Monitoring: Yes      Cardiac Rhythm: Sinus rhythm, Sinus tachy    Access:  Current line(s): PIV     Genitourinary:   Urinary Status: Voiding, External catheter    Respiratory:   O2 Device: None (Room air)  Chronic home O2 use?: NO  Incentive spirometer at bedside: NO    GI:  Last BM (including prior to admit):  (PTA)  Current diet:  ADULT ORAL NUTRITION SUPPLEMENT; Dinner; Standard High Calorie/High Protein Oral Supplement  ADULT ORAL NUTRITION SUPPLEMENT; Lunch; Frozen Oral Supplement  ADULT ORAL NUTRITION SUPPLEMENT; Breakfast; Clear Liquid Oral Supplement  ADULT DIET; Dysphagia - Pureed; No coffee or tea. Likes juice, pudding, ice cream. Vanilla Ensure.  Passing flatus: YES    Pain Management:   Patient states pain is manageable on current regimen: N/A    Skin:  Tomas Scale Score: 14  Interventions: Wound Offloading (Prevention Methods): Bed, pressure reduction mattress, Elevate heels, Pillows, Repositioning, Turning    Patient Safety:  Fall Risk: Nursing Judgement-Fall Risk High(Add Comments): Yes  Fall Risk Interventions  Nursing Judgement-Fall Risk High(Add Comments): Yes  Toilet Every 2 Hours-In Advance of Need: Yes  Hourly Visual Checks: Eyes closed, In bed  Fall Visual Posted: Armband, Fall sign posted  Room Door Open: Yes  Alarm On: Bed  Patient Moved Closer to Nursing Station: No    Active Consults:   IP CONSULT TO TELE-NEUROLOGY  IP CONSULT TO

## 2024-12-26 NOTE — PROGRESS NOTES
0700: bedside shift report received from night shift nurse. Dual skin assessment completed.     0800: shift assessment completed. Wound care completed. Tech is helping eat breakfast. Wife called and asked for updated on his transport, explained that there is nothing set up yet but I will let her know as soon as I hear something.    1000: called wife to update her on medications/discharge orders and transportation being arranged for today.     1040: reassessment completed.     1200: called wife and informed her that transport will be picking him up at 1 pm.    1300: gave report to EMS and removed PIV.

## 2024-12-27 ENCOUNTER — TELEPHONE (OUTPATIENT)
Facility: CLINIC | Age: 79
End: 2024-12-27

## 2024-12-27 NOTE — TELEPHONE ENCOUNTER
Called patient to confirm their in home visit with Dr. Robin on 12/30/2024, arrival between 10-4.  Spoke with wife, they confirmed the appointment and answered the covid screen questions. Does anyone in the household have covid no  Have there been any changes to insurance no or location no

## 2024-12-30 ENCOUNTER — OFFICE VISIT (OUTPATIENT)
Facility: CLINIC | Age: 79
End: 2024-12-30

## 2024-12-30 VITALS
OXYGEN SATURATION: 92 % | HEART RATE: 96 BPM | SYSTOLIC BLOOD PRESSURE: 131 MMHG | TEMPERATURE: 99.2 F | RESPIRATION RATE: 20 BRPM | DIASTOLIC BLOOD PRESSURE: 71 MMHG

## 2024-12-30 DIAGNOSIS — N18.32 STAGE 3B CHRONIC KIDNEY DISEASE (CKD) (HCC): Chronic | ICD-10-CM

## 2024-12-30 DIAGNOSIS — Z86.73 HISTORY OF STROKE: Chronic | ICD-10-CM

## 2024-12-30 DIAGNOSIS — I10 ESSENTIAL HYPERTENSION: Chronic | ICD-10-CM

## 2024-12-30 DIAGNOSIS — G40.909 SEIZURE DISORDER (HCC): Chronic | ICD-10-CM

## 2024-12-30 DIAGNOSIS — I69.359 HEMIPLEGIA OF NONDOMINANT SIDE AS LATE EFFECT OF CEREBROVASCULAR ACCIDENT (CVA) (HCC): Primary | Chronic | ICD-10-CM

## 2024-12-30 DIAGNOSIS — E78.2 MIXED HYPERLIPIDEMIA: Chronic | ICD-10-CM

## 2024-12-30 PROBLEM — R56.9 SEIZURE (HCC): Status: RESOLVED | Noted: 2024-12-19 | Resolved: 2024-12-30

## 2024-12-30 RX ORDER — LEVETIRACETAM 100 MG/ML
750 SOLUTION ORAL 2 TIMES DAILY
Qty: 300 ML | Refills: 5
Start: 2024-12-30 | End: 2025-12-30

## 2024-12-30 NOTE — PROGRESS NOTES
[facility]      PRIMARY CARE AT HOME - TRANSITIONAL CARE MANAGEMENT HOME VISIT      NAME: Nehemias Burleson Jr.    ADDRESS: [address] [place] [address]    :  1945  MRN:  [id number]      DATE OF ADMISSION: 24  DATE OF DISCHARGE: 24      ASSESSMENT:     Diagnosis Orders   1. 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 History of stroke        3. Seizure disorder (HCC)  levETIRAcetam (KEPPRA) 100 MG/ML oral solution      4. Essential hypertension        5. Stage 3b chronic kidney disease (CKD) (HCC)        6. Mixed hyperlipidemia              PLAN:    CVA/ HEMIPLEGIA/ SEIZURE DISORDER: He has had no seizures since his hospital discharge. Will check anti-convulsant medication levels in 1-2 weeks and recheck in 4 weeks.     HYPERTENSION: This problem is stable. Current treatment was continued as noted above.     CKD: This problem is stable. Will continue close surveillance.    HYPERLIPIDEMIA: This problem is stable. Will continue current treatment including diet, exercise and medication.      SUBJECTIVE:    Chief Complaint:  Chief Complaint   Patient presents with    Other     [facility] HOME VISIT CVA/ HEMIPLEGIA/ SEIZURE DISORDER       History of Present Illness:    Nehemias Burleson Jr. is a 79 y.o. male who is seen for an Transitional Care Management [facility] Primary Care At Home Home Visit. It would be physically and emotionally difficult to take the patient from the home to seek primary care services in the community. The patient is homebound as a result of very limited mobility due to multiple medical problems including a CVA. He was recently admitted to the hospital with frequent GM Seizures. Depakote was added to his routine.      The history is obtained from the family and previous records and is felt to be satisfactory to establish an acceptable plan of care. Health status indicators were reviewed and there are no changes

## 2025-01-06 ENCOUNTER — SOCIAL WORK (OUTPATIENT)
Facility: CLINIC | Age: 80
End: 2025-01-06

## 2025-01-06 SDOH — SOCIAL STABILITY: SOCIAL NETWORK: HOW OFTEN DO YOU GET TOGETHER WITH FRIENDS OR RELATIVES?: MORE THAN THREE TIMES A WEEK

## 2025-01-06 SDOH — HEALTH STABILITY: MENTAL HEALTH: HOW MANY STANDARD DRINKS CONTAINING ALCOHOL DO YOU HAVE ON A TYPICAL DAY?: PATIENT DOES NOT DRINK

## 2025-01-06 SDOH — HEALTH STABILITY: MENTAL HEALTH
STRESS IS WHEN SOMEONE FEELS TENSE, NERVOUS, ANXIOUS, OR CAN'T SLEEP AT NIGHT BECAUSE THEIR MIND IS TROUBLED. HOW STRESSED ARE YOU?: NOT AT ALL

## 2025-01-06 SDOH — ECONOMIC STABILITY: FOOD INSECURITY: WITHIN THE PAST 12 MONTHS, THE FOOD YOU BOUGHT JUST DIDN'T LAST AND YOU DIDN'T HAVE MONEY TO GET MORE.: NEVER TRUE

## 2025-01-06 SDOH — SOCIAL STABILITY: SOCIAL INSECURITY
WITHIN THE LAST YEAR, HAVE YOU BEEN KICKED, HIT, SLAPPED, OR OTHERWISE PHYSICALLY HURT BY YOUR PARTNER OR EX-PARTNER?: NO

## 2025-01-06 SDOH — ECONOMIC STABILITY: FOOD INSECURITY: WITHIN THE PAST 12 MONTHS, YOU WORRIED THAT YOUR FOOD WOULD RUN OUT BEFORE YOU GOT MONEY TO BUY MORE.: NEVER TRUE

## 2025-01-06 SDOH — SOCIAL STABILITY: SOCIAL NETWORK: HOW OFTEN DO YOU ATTEND CHURCH OR RELIGIOUS SERVICES?: NEVER

## 2025-01-06 SDOH — HEALTH STABILITY: PHYSICAL HEALTH: ON AVERAGE, HOW MANY MINUTES DO YOU ENGAGE IN EXERCISE AT THIS LEVEL?: 0 MIN

## 2025-01-06 SDOH — SOCIAL STABILITY: SOCIAL INSECURITY: WITHIN THE LAST YEAR, HAVE YOU BEEN HUMILIATED OR EMOTIONALLY ABUSED IN OTHER WAYS BY YOUR PARTNER OR EX-PARTNER?: NO

## 2025-01-06 SDOH — HEALTH STABILITY: MENTAL HEALTH: HOW OFTEN DO YOU HAVE A DRINK CONTAINING ALCOHOL?: NEVER

## 2025-01-06 SDOH — SOCIAL STABILITY: SOCIAL NETWORK: ARE YOU MARRIED, WIDOWED, DIVORCED, SEPARATED, NEVER MARRIED, OR LIVING WITH A PARTNER?: MARRIED

## 2025-01-06 SDOH — SOCIAL STABILITY: SOCIAL INSECURITY
WITHIN THE LAST YEAR, HAVE TO BEEN RAPED OR FORCED TO HAVE ANY KIND OF SEXUAL ACTIVITY BY YOUR PARTNER OR EX-PARTNER?: NO

## 2025-01-06 SDOH — SOCIAL STABILITY: SOCIAL NETWORK: HOW OFTEN DO YOU ATTENT MEETINGS OF THE CLUB OR ORGANIZATION YOU BELONG TO?: NEVER

## 2025-01-06 SDOH — SOCIAL STABILITY: SOCIAL NETWORK
DO YOU BELONG TO ANY CLUBS OR ORGANIZATIONS SUCH AS CHURCH GROUPS UNIONS, FRATERNAL OR ATHLETIC GROUPS, OR SCHOOL GROUPS?: NO

## 2025-01-06 SDOH — SOCIAL STABILITY: SOCIAL NETWORK: IN A TYPICAL WEEK, HOW MANY TIMES DO YOU TALK ON THE PHONE WITH FAMILY, FRIENDS, OR NEIGHBORS?: NEVER

## 2025-01-06 SDOH — ECONOMIC STABILITY: INCOME INSECURITY: HOW HARD IS IT FOR YOU TO PAY FOR THE VERY BASICS LIKE FOOD, HOUSING, MEDICAL CARE, AND HEATING?: NOT HARD AT ALL

## 2025-01-06 SDOH — SOCIAL STABILITY: SOCIAL INSECURITY: WITHIN THE LAST YEAR, HAVE YOU BEEN AFRAID OF YOUR PARTNER OR EX-PARTNER?: NO

## 2025-01-06 SDOH — ECONOMIC STABILITY: INCOME INSECURITY: IN THE LAST 12 MONTHS, WAS THERE A TIME WHEN YOU WERE NOT ABLE TO PAY THE MORTGAGE OR RENT ON TIME?: NO

## 2025-01-06 SDOH — HEALTH STABILITY: PHYSICAL HEALTH: ON AVERAGE, HOW MANY DAYS PER WEEK DO YOU ENGAGE IN MODERATE TO STRENUOUS EXERCISE (LIKE A BRISK WALK)?: 0 DAYS

## 2025-01-06 NOTE — PROGRESS NOTES
Social Determinants of Health screening updated.     JEANNINE Mason, LCSW     [facility] [place] [facility]  Primary Care at Home  [facility]   [address], [address]   [place] [address]  (C) [phone]  (O) [phone]  [email or web address]

## 2025-01-07 ENCOUNTER — TELEPHONE (OUTPATIENT)
Facility: CLINIC | Age: 80
End: 2025-01-07

## 2025-01-07 RX ORDER — VALPROIC ACID 250 MG/5ML
250 SOLUTION ORAL 4 TIMES DAILY
Qty: 1800 ML | Refills: 5 | Status: SHIPPED | OUTPATIENT
Start: 2025-01-07 | End: 2025-01-10 | Stop reason: SDUPTHER

## 2025-01-07 RX ORDER — AMLODIPINE BESYLATE 10 MG/1
10 TABLET ORAL DAILY
Qty: 90 TABLET | Refills: 1 | Status: SHIPPED | OUTPATIENT
Start: 2025-01-07

## 2025-01-07 RX ORDER — METOPROLOL TARTRATE 25 MG/1
25 TABLET, FILM COATED ORAL 2 TIMES DAILY
Qty: 180 TABLET | Refills: 1 | Status: SHIPPED | OUTPATIENT
Start: 2025-01-07

## 2025-01-07 NOTE — TELEPHONE ENCOUNTER
Telephone call returned to wife Lillian, requested refills for meds ordered when in hospital, valproic acid, metoprolol and amlodipine.  Refills pended to Dr. Robin.

## 2025-01-10 RX ORDER — VALPROIC ACID 250 MG/5ML
250 SOLUTION ORAL 4 TIMES DAILY
Qty: 1800 ML | Refills: 5 | Status: SHIPPED | OUTPATIENT
Start: 2025-01-10

## 2025-01-10 NOTE — TELEPHONE ENCOUNTER
Telephone call from Lillian reporting that she was able to  two of the refilled meds for patient but the Valproic Acid was not there.  Checked medical record to see that this med was sent to [facility] Mail order which Lillian says they do not have.  Med resent to [facility].

## 2025-01-14 ENCOUNTER — TELEPHONE (OUTPATIENT)
Facility: CLINIC | Age: 80
End: 2025-01-14

## 2025-01-14 DIAGNOSIS — G40.909 SEIZURE DISORDER (HCC): Primary | Chronic | ICD-10-CM

## 2025-01-14 NOTE — TELEPHONE ENCOUNTER
Call placed to patient's wife - She confirmed that patient does not have  care nursing coming.  I scheduled lab visit for Thursday, 1/16 between 2-3PM.

## 2025-01-15 ENCOUNTER — TELEPHONE (OUTPATIENT)
Facility: CLINIC | Age: 80
End: 2025-01-15

## 2025-01-15 NOTE — TELEPHONE ENCOUNTER
[facility] Primary Care at Home ([facility])   Phone:  [phone]      Fax:  [phone] [address], [address]  [place] [address]    Name:  Nehemias Burleson Jr.  YOB: 1945    Called patient's wife Lillian to schedule patient's  Primary Care at Home follow up appointment with Dr. Desean Robin.  Appointment scheduled for 1/22/25.  Patient has an appointment 1/16/25 at 2pm with Teagan Mcgovern RN for labs.      Dulce Robin, MGA, Gerontological Nursing-[facility]

## 2025-01-15 NOTE — TELEPHONE ENCOUNTER
Lillian Burleson is the patient's spouse.  She called to f/u on a text message she received stating that the patient has a lab appointment today and needs to report to the providers office at 2pm.  I reassured Lillian that the patient will always be seen at home by our providers and nurses since he is homebound and part of the [facility] Primary Care at Home program.  She acknowledged.

## 2025-01-16 ENCOUNTER — NURSE ONLY (OUTPATIENT)
Facility: CLINIC | Age: 80
End: 2025-01-16

## 2025-01-16 DIAGNOSIS — G40.909 SEIZURE DISORDER (HCC): Primary | ICD-10-CM

## 2025-01-16 DIAGNOSIS — G40.909 SEIZURE DISORDER (HCC): Chronic | ICD-10-CM

## 2025-01-16 NOTE — PROGRESS NOTES
SN visit for labs.     Patient in bed, sleeping at my arrival. He opens his eyes and responds to light touch, but remains drowsy during the visit.    L arm edematous. Patient's wife informs that this arm had been used for all IV accesses and blood draws during hospitalization and since that time, it has not been back to the usual appearance.    I provided education on elevating his arm higher than his body and leaving the arm/forearm as extended as possible, avoiding flexing his elbow.     Venipuncture x2 attempted to Right Arm - unsuccessful. Patient is somewhat resistant, withdrawing his arm and seemed dry. I encouraged increasing fluid.      When asked about nutrition, Mrs. Burleson responded that he has good appetite and eats the entire (blended) meal that she prepares.     Wound - As I was providing information about repositioning his arm and body, I asked to visualize patient's sacral area. Bilateral sacral wound seems WORSE than they did on pictures taken while inpatient. I discussed with wife the importance of repositioning, turning and keeping the area as clean as possible. Wife is providing excellent care, especially considering that she is elderly and frail herself.  I informed her that I discuss ordering Home Health nursing with Dr. Robin.    I offered to help with incontinence care, but wife declined.

## 2025-01-17 ENCOUNTER — TELEPHONE (OUTPATIENT)
Facility: CLINIC | Age: 80
End: 2025-01-17

## 2025-01-17 NOTE — TELEPHONE ENCOUNTER
Called patient to confirm their in home visit with Dr. Robin on 01/22/2025, arrival between 10-4.  Spoke with wife, they confirmed the appointment and answered the covid screen questions. Does anyone in the household have covid no  Have there been any changes to insurance no or location no

## 2025-01-22 ENCOUNTER — OFFICE VISIT (OUTPATIENT)
Facility: CLINIC | Age: 80
End: 2025-01-22

## 2025-01-22 DIAGNOSIS — E78.2 MIXED HYPERLIPIDEMIA: Chronic | ICD-10-CM

## 2025-01-22 DIAGNOSIS — N18.32 STAGE 3B CHRONIC KIDNEY DISEASE (CKD) (HCC): ICD-10-CM

## 2025-01-22 DIAGNOSIS — I69.359 HEMIPLEGIA OF NONDOMINANT SIDE AS LATE EFFECT OF CEREBROVASCULAR ACCIDENT (CVA) (HCC): Primary | ICD-10-CM

## 2025-01-22 DIAGNOSIS — G40.909 SEIZURE DISORDER (HCC): ICD-10-CM

## 2025-01-22 DIAGNOSIS — Z86.73 HISTORY OF STROKE: Chronic | ICD-10-CM

## 2025-01-22 DIAGNOSIS — F01.C0 SEVERE VASCULAR DEMENTIA WITHOUT BEHAVIORAL DISTURBANCE, PSYCHOTIC DISTURBANCE, MOOD DISTURBANCE, OR ANXIETY (HCC): ICD-10-CM

## 2025-01-22 DIAGNOSIS — I10 ESSENTIAL HYPERTENSION: Chronic | ICD-10-CM

## 2025-01-22 RX ORDER — ATORVASTATIN CALCIUM 40 MG/1
40 TABLET, FILM COATED ORAL DAILY
Qty: 90 TABLET | Refills: 1 | Status: SHIPPED | OUTPATIENT
Start: 2025-01-22

## 2025-01-31 VITALS
SYSTOLIC BLOOD PRESSURE: 105 MMHG | HEART RATE: 88 BPM | TEMPERATURE: 97.6 F | DIASTOLIC BLOOD PRESSURE: 66 MMHG | RESPIRATION RATE: 16 BRPM

## 2025-01-31 NOTE — PROGRESS NOTES
[facility] SERVICES      Primary Care At Home - Home Visit      Name: Nehemias Burleson Jr.    Address: [address], [place]  [address]    :  1945  MRN:  [id number]      ASSESSMENT:     Diagnosis Orders   1. 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 History of stroke        3. Seizure disorder (HCC)        4. Essential hypertension        5. Stage 3b chronic kidney disease (CKD) (HCC)        6. Mixed hyperlipidemia        7. Severe vascular dementia without behavioral disturbance, psychotic disturbance, mood disturbance, or anxiety (HCC)              PLAN:    CVA/ HEMIPARESIS/ SEIZURE DISORDER: This problem is stable. Will continue current rx.     HYPERTENSION/ CKD: This problem is stable. Current treatment was continued as noted above.     HYPERLIPIDEMIA: This problem is stable. Will continue current treatment including diet, exercise and medication.    DEMENTIA: This problem has deteriorated. Will continue close surveillance.      SUBJECTIVE:    Chief Complaint:  Chief Complaint   Patient presents with    Other     HOME VISIT: CVA/ SEIZURE DISORDER/ HTN       History of Present Illness:    Nehemias Burleson Jr. is a 79 y.o. male who is seen with his wife for an [facility] Primary Care At Home Home Visit. It would be physically and emotionally difficult to take the patient from the home to seek primary care services in the community. The patient is homebound as a result of very limited mobility due to multiple medical problems including CVA with L Sided Paralysis. He has had no further seizures since adding Depakote. He is lovingly cared for at home by his wife.    The patient has a number of chronic medical problems as listed above.    The history is obtained from his wife and previous records and is felt to be satisfactory to establish an acceptable plan of care. Health status indicators were reviewed and there are no

## 2025-02-13 ENCOUNTER — TELEPHONE (OUTPATIENT)
Facility: CLINIC | Age: 80
End: 2025-02-13

## 2025-02-13 NOTE — TELEPHONE ENCOUNTER
Called patient to confirm their in home visit with Dr. Robin on 02/18/2025, arrival between 10-4.  Spoke with Lillian Burleson, they confirmed the appointment and answered the covid screen questions. Does anyone in the household have covid no  Have there been any changes to insurance no or location no

## 2025-02-16 ENCOUNTER — OFFICE VISIT (OUTPATIENT)
Facility: CLINIC | Age: 80
End: 2025-02-16

## 2025-02-16 DIAGNOSIS — R13.11 ORAL PHASE DYSPHAGIA: Chronic | ICD-10-CM

## 2025-02-16 DIAGNOSIS — I69.359 HEMIPLEGIA OF NONDOMINANT SIDE AS LATE EFFECT OF CEREBROVASCULAR ACCIDENT (CVA) (HCC): Primary | Chronic | ICD-10-CM

## 2025-02-16 DIAGNOSIS — G40.909 SEIZURE DISORDER (HCC): Chronic | ICD-10-CM

## 2025-02-16 DIAGNOSIS — F01.C0 SEVERE VASCULAR DEMENTIA WITHOUT BEHAVIORAL DISTURBANCE, PSYCHOTIC DISTURBANCE, MOOD DISTURBANCE, OR ANXIETY (HCC): Chronic | ICD-10-CM

## 2025-02-16 DIAGNOSIS — I10 ESSENTIAL HYPERTENSION: Chronic | ICD-10-CM

## 2025-02-16 DIAGNOSIS — Z86.73 HISTORY OF STROKE: Chronic | ICD-10-CM

## 2025-02-16 DIAGNOSIS — N18.32 STAGE 3B CHRONIC KIDNEY DISEASE (CKD) (HCC): Chronic | ICD-10-CM

## 2025-02-20 ENCOUNTER — TELEPHONE (OUTPATIENT)
Facility: CLINIC | Age: 80
End: 2025-02-20

## 2025-02-20 RX ORDER — FOLIC ACID 1 MG/1
1000 TABLET ORAL DAILY
Qty: 90 TABLET | Refills: 1 | Status: SHIPPED | OUTPATIENT
Start: 2025-02-20

## 2025-02-20 NOTE — TELEPHONE ENCOUNTER
Lillian Burleson called to request refill on the patient's folic acid script.  She said that the pharmacy told her it was last refilled 8/12/2024 but they did not answer her on getting it refilled and when it would be ready.  She states that the patient has 5 pills left and needs the medication by the weekend.  The [facility] uses [facility] at [address] [place] [address].  Lillian's callback # is [phone].

## 2025-02-21 VITALS
RESPIRATION RATE: 16 BRPM | TEMPERATURE: 97.3 F | HEART RATE: 55 BPM | DIASTOLIC BLOOD PRESSURE: 70 MMHG | SYSTOLIC BLOOD PRESSURE: 95 MMHG

## 2025-02-21 PROBLEM — R13.10 DYSPHAGIA: Chronic | Status: ACTIVE | Noted: 2023-03-23

## 2025-02-21 NOTE — PROGRESS NOTES
[facility]      Primary Care At Home - Home Visit      Name: Nehemias Burleson Jr.    Address: [address] [place] [address]    :  1945  MRN:  [id number]      ASSESSMENT:     Diagnosis Orders   1. 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 History of stroke        3. Seizure disorder (HCC)        4. Oral phase dysphagia        5. Essential hypertension        6. Stage 3b chronic kidney disease (CKD) (HCC)        7. Severe vascular dementia without behavioral disturbance, psychotic disturbance, mood disturbance, or anxiety (HCC)              PLAN:    CVA/ HEMIPLEGIA/ SEIZURE DISORDER/ DYSPHAGIA: This problem is stable. Will continue current routine. Will recheck in 1 month.    HYPERTENSION/ CKD: This problem is stable. Current treatment was continued as noted above.    DEMENTIA: This problem has deteriorated. Will continue close surveillance.      SUBJECTIVE:    Chief Complaint:  Chief Complaint   Patient presents with    Other     HOME VISIT: CVA/ HEMIPLEGIA/ HTN       History of Present Illness:    Nehemias Burleson Jr. is a 79 y.o. male who is seen with his wife for an [facility] Primary Care At Home Home Visit. It would be physically and emotionally difficult to take the patient from the home to seek primary care services in the community. The patient is homebound as a result of very limited mobility due to multiple medical problems including a CVA with L sided Hemiplegia. He has had no further seizures. No new problems are reported.     The patient has a number of chronic medical problems as listed above.    The history is obtained from the patient, family and previous records and is felt to be satisfactory to establish an acceptable plan of care. Health status indicators were reviewed and there are no changes except as noted above. The past medical history, family history, social history and ethnic

## 2025-03-02 NOTE — CONSULTS
Neurology Note    Patient ID:  Emanuel Kelly  [id number]  76 y.o.  1945      Date of Consultation:  November 28, 2022    Referring Physician: Dr. Anyi Valladares    Reason for Consultation:  seizure      Assessment and Plan:    The patient is a pleasant 77-year-old gentleman admitted to the hospital with a breakthrough seizure. His neurological examination reveals significant cognitive impairment, generalized quadriparesis with mild asymmetry. The left side appears to be weaker than the right. Breakthrough seizures:    Differential includes associated with urinary tract infection reducing the patient's seizure threshold, not receiving seizure medication for close to 2 weeks, possible new stroke. Given his persistent altered mental status which appears to be worse than baseline, I will obtain an urgent EEG this morning. The patient should receive a brain MRI. Order was placed  Continue Keppra 1000 mg twice a day. If EEG shows ongoing seizure activity, will restart second seizure medication. Encephalopathy:  Patient does have a baseline dementia. Possible worsening mental status associated with infection, seizure. Will need to follow his mental status closely      UTI: Started on antibiotics. Prior history of stroke:  Unless contraindicated, the patient should be on 81 mg aspirin    Neurology will continue to follow closely this complex patient with multiple ongoing medical conditions             Subjective: no verbal output       History of Present Illness:   Emanuel Snow is a 76 y.o. male with a history of prior stroke with residual inability to ambulate, hypertension, dementia, and seizure disorder who presented to the hospital for a seizure. The history is from the patient's wife and son who are at the bedside today. The patient has had a difficult 2022 which included a prolonged hospitalization with COVID as well as having had a stroke.   He has been in and out of the hospital, skilled nursing facility, rehabilitation. They are just moving to [place] from [place] and are currently living with his son. After the patient's last discharge from rehabilitation, the patient was supposed to be on seizure medication but appears there has been difficulty in obtaining the medication and he has not been on his seizure medicine for approximately 2 weeks. The wife and son were not exactly sure on the amount of medication he was taking and the frequency of the medication. At baseline, the patient has been bedbound. He does recognize his wife by voice but has a rather severe dementia. He also does have generalized weakness but worse on the left side. He does need help with all of his activities of daily living. Upon the day of admission, the family noticed that he was staring off into space and did have shaking of his arm and leg. There was some foam coming from his mouth. The exact duration was unclear. He was found to have a urinary tract infection. Past Medical History:   Diagnosis Date    Cholesterolosis     Hypertension     Kidney disease     [facility] ([facility] [address]     Stroke ([facility] [address]         No pertinent past surgical history    No family history of neurodegenerative disease.       Social History     Tobacco Use    Smoking status: Per family , none    Smokeless tobacco: Not on file   Substance Use Topics    Alcohol use: Per family, none        No Known Allergies     Prior to Admission medications    Not on File     Current Facility-Administered Medications   Medication Dose Route Frequency    cefTRIAXone (ROCEPHIN) 1 g in 0.9% sodium chloride (MBP/ADV) 50 mL MBP  1 g IntraVENous Q24H    acetaminophen (TYLENOL) tablet 650 mg  650 mg Oral Q6H PRN    ondansetron (ZOFRAN) injection 4 mg  4 mg IntraVENous Q4H PRN    pantoprazole (PROTONIX) 40 mg in 0.9% sodium chloride 10 mL injection  40 mg IntraVENous Q12H    albuterol-ipratropium (DUO-NEB) 2.5 MG-0.5 MG/3 ML  3 mL Nebulization Q6H PRN    hydrALAZINE (APRESOLINE) 20 mg/mL injection 10 mg  10 mg IntraVENous Q6H PRN    LORazepam (INTENSOL) 2 mg/mL oral concentrate 2 mg  2 mg Oral Q4H PRN    0.9% sodium chloride infusion  75 mL/hr IntraVENous CONTINUOUS    levETIRAcetam (KEPPRA) injection 1,000 mg  1,000 mg IntraVENous Q12H       Review of Systems:    [x]Unable to obtain  ROS due to  [x]mental status change  []sedated   []intubated    Objective:     Visit Vitals  /70 (BP 1 Location: Left upper arm, BP Patient Position: At rest)   Pulse 94   Temp 98.3 °F (36.8 °C)   Resp 16   Ht 5' 7\" (1.702 m)   Wt 163 lb (73.9 kg)   SpO2 100%   BMI 25.53 kg/m²       Physical Exam:      General:  appears in no acute distress  Neck: no carotid bruits  Lungs: clear to auscultation  Heart:  no murmurs, regular rate  Lower extremity: peripheral pulses palpable and no edema  Skin: intact. There is a dry healing wound on his heel       Neurological exam:  The patient was minimally arousable. He will not follow any commands. There was no verbal output for me. The patient's wife and son feel that he is less responsive than his baseline. Cranial nerves:   II-XII were tested    Perrrla  Fundoscopic examination attempted but difficult to visualize fundus due to cooperation  He does resist eye opening  Facial sensation:  normal and symmetric  Facial motor: Symmetric grimace to noxious stimulation  He would not protrude his tongue    Motor: Tone -increased in his left upper and lower extremity    No evidence of fasciculations    Strength testing: He would not participate with manual motor testing. There was spontaneous movement noted in his right upper extremity. He would not follow any commands with that extremity. He does grimace to noxious stimulation to deep painful stimulation in all 4 extremities. He does have a withdrawal in the right upper and lower extremity.       Reflexes:    Right Left  Biceps  2 2  Triceps 2 2  Brachiorad. 2 2  Patella  1 1  Achilles - -    Plantar response:  extensor bilaterally      Cerebellar testing:  no tremor apparent    Gait: not assessed due to mental status    Labs:     Lab Results   Component Value Date/Time    Sodium 137 11/28/2022 03:27 AM    Potassium 4.4 11/28/2022 03:27 AM    Chloride 109 (H) 11/28/2022 03:27 AM    Glucose 98 11/28/2022 03:27 AM    BUN 18 11/28/2022 03:27 AM    Creatinine 1.67 (H) 11/28/2022 03:27 AM    Calcium 8.6 11/28/2022 03:27 AM    WBC 8.0 11/28/2022 03:27 AM    HCT 28.6 (L) 11/28/2022 03:27 AM    HGB 9.4 (L) 11/28/2022 03:27 AM    PLATELET 875 29/97/5644 03:27 AM       Imaging:    No results found for this or any previous visit. Results from [place] encounter on 11/27/22    CT HEAD WO CONT    Narrative  EXAM: Brain CT without contrast.    INDICATION: status epilepticus    TECHNIQUE: Noncontrast CT of the brain is performed with 5 mm collimation. Bone  algorithm axial and sagittal and coronal reconstructions performed and  evaluated. CT dose reduction was achieved through use of a standardized  protocol tailored for this examination and automatic exposure control for dose  modulation. COMPARISON: None    FINDINGS: There is no acute intracranial hemorrhage, mass, mass effect or  herniation. Ventricles and sulci show proportionate and symmetric prominence. There is periventricular white matter hypodensity. The gray-white matter  differentiation is well-preserved. Atherosclerotic calcifications are seen  within the carotid siphons and vertebral arteries. The mastoid air cells are  well pneumatized. The visualized paranasal sinuses are normal.    Impression  No acute intracranial hemorrhage, mass or infarct. Cerebral atrophy  and white matter change most compatible with chronic small vessel ischemic  and/or senescent change. Intracranial atherosclerosis. I did independently review the head CT from November 27, 2022.   There is no acute abnormalities. There was significant atrophy and chronic microvascular ischemic disease.              Active Problems:    Breakthrough seizure ([facility] [address] (11/27/2022)                   Signed By:  Noah Sears DO FAAN    November 28, 2022 The patient is a 14y Male complaining of abdominal pain.

## 2025-03-06 ENCOUNTER — TELEPHONE (OUTPATIENT)
Facility: CLINIC | Age: 80
End: 2025-03-06

## 2025-03-06 NOTE — TELEPHONE ENCOUNTER
Called patient to confirm their in home visit with Dr. Robin on 03/10/2025, arrival between 10-4.  Spoke with Lillian Burleson, they confirmed the appointment and answered the covid screen questions. Does anyone in the household have covid no  Have there been any changes to insurance no or location no

## 2025-03-10 ENCOUNTER — OFFICE VISIT (OUTPATIENT)
Facility: CLINIC | Age: 80
End: 2025-03-10
Payer: MEDICARE

## 2025-03-10 DIAGNOSIS — I69.359 HEMIPLEGIA OF NONDOMINANT SIDE AS LATE EFFECT OF CEREBROVASCULAR ACCIDENT (CVA) (HCC): Primary | Chronic | ICD-10-CM

## 2025-03-10 DIAGNOSIS — G40.909 SEIZURE DISORDER (HCC): Chronic | ICD-10-CM

## 2025-03-10 DIAGNOSIS — E78.2 MIXED HYPERLIPIDEMIA: Chronic | ICD-10-CM

## 2025-03-10 DIAGNOSIS — N18.32 STAGE 3B CHRONIC KIDNEY DISEASE (CKD) (HCC): Chronic | ICD-10-CM

## 2025-03-10 DIAGNOSIS — I10 ESSENTIAL HYPERTENSION: Chronic | ICD-10-CM

## 2025-03-10 DIAGNOSIS — R13.11 ORAL PHASE DYSPHAGIA: Chronic | ICD-10-CM

## 2025-03-10 DIAGNOSIS — Z86.73 HISTORY OF STROKE: Chronic | ICD-10-CM

## 2025-03-10 DIAGNOSIS — K21.9 GASTROESOPHAGEAL REFLUX DISEASE WITHOUT ESOPHAGITIS: Chronic | ICD-10-CM

## 2025-03-10 PROCEDURE — 99349 HOME/RES VST EST MOD MDM 40: CPT | Performed by: FAMILY MEDICINE

## 2025-03-10 PROCEDURE — G8420 CALC BMI NORM PARAMETERS: HCPCS | Performed by: FAMILY MEDICINE

## 2025-03-10 PROCEDURE — 1036F TOBACCO NON-USER: CPT | Performed by: FAMILY MEDICINE

## 2025-03-10 PROCEDURE — 3078F DIAST BP <80 MM HG: CPT | Performed by: FAMILY MEDICINE

## 2025-03-10 PROCEDURE — 3075F SYST BP GE 130 - 139MM HG: CPT | Performed by: FAMILY MEDICINE

## 2025-03-10 PROCEDURE — 1123F ACP DISCUSS/DSCN MKR DOCD: CPT | Performed by: FAMILY MEDICINE

## 2025-03-11 VITALS
HEART RATE: 71 BPM | TEMPERATURE: 97.8 F | OXYGEN SATURATION: 97 % | RESPIRATION RATE: 14 BRPM | SYSTOLIC BLOOD PRESSURE: 134 MMHG | DIASTOLIC BLOOD PRESSURE: 72 MMHG

## 2025-03-11 NOTE — PROGRESS NOTES
[facility] SERVICES      Primary Care At Home - Home Visit      Name: Nehemias Burleson Jr.    Address: [address], [place]  [address]    :  1945  MRN:  [id number]      ASSESSMENT:     Diagnosis Orders   1. 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 History of stroke        3. Seizure disorder (HCC)        4. Essential hypertension        5. Stage 3b chronic kidney disease (CKD) (HCC)        6. Mixed hyperlipidemia        7. Oral phase dysphagia        8. Gastroesophageal reflux disease without esophagitis              PLAN:    CVA/ HEMIPLEGIA L/ SEIZURE DISORDER: This problem is stable. Will continue anti-seizure meds. Will recheck in 1 month.    HYPERTENSION/ CKD: This problem is stable. Current treatment was continued as noted above.     HYPERLIPIDEMIA: This problem is stable. Will continue current treatment including diet, exercise and medication.    DYSPHAGIA/ GERD: This problem is stable. Will continue close surveillance.      SUBJECTIVE:    Chief Complaint:  Chief Complaint   Patient presents with    Other     HOME VISIT: CVA/ HTN       History of Present Illness:    Nehemias Burleson Jr. is a 79 y.o. male who is seen with his wife for an [facility] Primary Care At Home Home Visit. It would be physically and emotionally difficult to take the patient from the home to seek primary care services in the community. The patient is homebound as a result of very limited mobility due to multiple medical problems including CVA and HTN. He remains completely and permanently disabled as a result of his stroke.    The patient has a number of chronic medical problems as listed above.    The history is obtained from the patient, family and previous records and is felt to be satisfactory to establish an acceptable plan of care. Health status indicators were reviewed and there are no changes except as noted above. The past medical

## 2025-03-25 ENCOUNTER — OFFICE VISIT (OUTPATIENT)
Facility: CLINIC | Age: 80
End: 2025-03-25

## 2025-03-25 DIAGNOSIS — Z76.89 ENCOUNTER FOR SOCIAL WORK INTERVENTION: Primary | ICD-10-CM

## 2025-03-25 SDOH — HEALTH STABILITY: PHYSICAL HEALTH: ON AVERAGE, HOW MANY DAYS PER WEEK DO YOU ENGAGE IN MODERATE TO STRENUOUS EXERCISE (LIKE A BRISK WALK)?: 0 DAYS

## 2025-03-25 SDOH — ECONOMIC STABILITY: FOOD INSECURITY: WITHIN THE PAST 12 MONTHS, YOU WORRIED THAT YOUR FOOD WOULD RUN OUT BEFORE YOU GOT THE MONEY TO BUY MORE.: NEVER TRUE

## 2025-03-25 SDOH — HEALTH STABILITY: MENTAL HEALTH: HOW MANY DRINKS CONTAINING ALCOHOL DO YOU HAVE ON A TYPICAL DAY WHEN YOU ARE DRINKING?: PATIENT DOES NOT DRINK

## 2025-03-25 SDOH — SOCIAL STABILITY: SOCIAL NETWORK: HOW OFTEN DO YOU ATTEND CHURCH OR RELIGIOUS SERVICES?: NEVER

## 2025-03-25 SDOH — SOCIAL STABILITY: SOCIAL NETWORK
DO YOU BELONG TO ANY CLUBS OR ORGANIZATIONS SUCH AS CHURCH GROUPS, UNIONS, FRATERNAL OR ATHLETIC GROUPS, OR SCHOOL GROUPS?: NO

## 2025-03-25 SDOH — ECONOMIC STABILITY: FOOD INSECURITY: WITHIN THE PAST 12 MONTHS, THE FOOD YOU BOUGHT JUST DIDN'T LAST AND YOU DIDN'T HAVE MONEY TO GET MORE.: NEVER TRUE

## 2025-03-25 SDOH — HEALTH STABILITY: MENTAL HEALTH
DO YOU FEEL STRESS - TENSE, RESTLESS, NERVOUS, OR ANXIOUS, OR UNABLE TO SLEEP AT NIGHT BECAUSE YOUR MIND IS TROUBLED ALL THE TIME - THESE DAYS?: NOT AT ALL

## 2025-03-25 SDOH — SOCIAL STABILITY: SOCIAL INSECURITY: WITHIN THE LAST YEAR, HAVE YOU BEEN HUMILIATED OR EMOTIONALLY ABUSED IN OTHER WAYS BY YOUR PARTNER OR EX-PARTNER?: NO

## 2025-03-25 SDOH — SOCIAL STABILITY: SOCIAL INSECURITY: WITHIN THE LAST YEAR, HAVE YOU BEEN AFRAID OF YOUR PARTNER OR EX-PARTNER?: NO

## 2025-03-25 SDOH — HEALTH STABILITY: PHYSICAL HEALTH: ON AVERAGE, HOW MANY MINUTES DO YOU ENGAGE IN EXERCISE AT THIS LEVEL?: 0 MIN

## 2025-03-25 SDOH — HEALTH STABILITY: MENTAL HEALTH: HOW OFTEN DO YOU HAVE A DRINK CONTAINING ALCOHOL?: NEVER

## 2025-03-25 SDOH — ECONOMIC STABILITY: FOOD INSECURITY: HOW HARD IS IT FOR YOU TO PAY FOR THE VERY BASICS LIKE FOOD, HOUSING, MEDICAL CARE, AND HEATING?: NOT HARD AT ALL

## 2025-03-25 SDOH — SOCIAL STABILITY: SOCIAL INSECURITY
WITHIN THE LAST YEAR, HAVE YOU BEEN RAPED OR FORCED TO HAVE ANY KIND OF SEXUAL ACTIVITY BY YOUR PARTNER OR EX-PARTNER?: NO

## 2025-03-25 SDOH — ECONOMIC STABILITY: TRANSPORTATION INSECURITY: IN THE PAST 12 MONTHS, HAS LACK OF TRANSPORTATION KEPT YOU FROM MEDICAL APPOINTMENTS OR FROM GETTING MEDICATIONS?: NO

## 2025-03-25 SDOH — SOCIAL STABILITY: SOCIAL NETWORK: HOW OFTEN DO YOU GET TOGETHER WITH FRIENDS OR RELATIVES?: NEVER

## 2025-03-25 SDOH — SOCIAL STABILITY: SOCIAL INSECURITY: ARE YOU MARRIED, WIDOWED, DIVORCED, SEPARATED, NEVER MARRIED, OR LIVING WITH A PARTNER?: MARRIED

## 2025-03-25 SDOH — SOCIAL STABILITY: SOCIAL NETWORK: IN A TYPICAL WEEK, HOW MANY TIMES DO YOU TALK ON THE PHONE WITH FAMILY, FRIENDS, OR NEIGHBORS?: NEVER

## 2025-03-25 SDOH — SOCIAL STABILITY: SOCIAL NETWORK: HOW OFTEN DO YOU ATTEND MEETINGS OF THE CLUBS OR ORGANIZATIONS YOU BELONG TO?: NEVER

## 2025-03-25 ASSESSMENT — ACTIVITIES OF DAILY LIVING (ADL): LACK_OF_TRANSPORTATION: NO

## 2025-03-25 NOTE — PROGRESS NOTES
LCSW visited with pt's spouse in the home where they live, along with their adult disabled son. Pt was resting in his bedroom. LCSW sat with spouse at dining room table. She signed [facility] annual paperwork on his behalf, as mPOA. She denied any acute psychosocial concerns at this time. LCSW to remain available for support and resources as needed.     JEANNINE Mason, [facility]     [facility]  Primary Care at [facility] Building   [address], [address]   [place] [address]  (C) [phone]  (O) [phone]  [email or web address]

## 2025-03-26 RX ORDER — FERROUS SULFATE 325(65) MG
1 TABLET ORAL
Qty: 90 TABLET | Refills: 1 | Status: SHIPPED | OUTPATIENT
Start: 2025-03-26

## 2025-04-14 ENCOUNTER — OFFICE VISIT (OUTPATIENT)
Facility: CLINIC | Age: 80
End: 2025-04-14
Payer: MEDICARE

## 2025-04-14 VITALS — HEART RATE: 77 BPM | OXYGEN SATURATION: 98 % | SYSTOLIC BLOOD PRESSURE: 128 MMHG | DIASTOLIC BLOOD PRESSURE: 70 MMHG

## 2025-04-14 DIAGNOSIS — G25.3 MYOCLONIC JERKING: ICD-10-CM

## 2025-04-14 DIAGNOSIS — I10 ESSENTIAL HYPERTENSION: Chronic | ICD-10-CM

## 2025-04-14 DIAGNOSIS — D64.9 NORMOCYTIC ANEMIA: ICD-10-CM

## 2025-04-14 DIAGNOSIS — N18.32 STAGE 3B CHRONIC KIDNEY DISEASE (CKD) (HCC): Chronic | ICD-10-CM

## 2025-04-14 DIAGNOSIS — I69.359 HEMIPLEGIA OF NONDOMINANT SIDE AS LATE EFFECT OF CEREBROVASCULAR ACCIDENT (CVA) (HCC): Chronic | ICD-10-CM

## 2025-04-14 DIAGNOSIS — E78.2 MIXED HYPERLIPIDEMIA: Chronic | ICD-10-CM

## 2025-04-14 DIAGNOSIS — L98.421 SACRAL ULCER, LIMITED TO BREAKDOWN OF SKIN (HCC): ICD-10-CM

## 2025-04-14 DIAGNOSIS — Z86.73 HISTORY OF STROKE: Chronic | ICD-10-CM

## 2025-04-14 DIAGNOSIS — F01.C0 SEVERE VASCULAR DEMENTIA WITHOUT BEHAVIORAL DISTURBANCE, PSYCHOTIC DISTURBANCE, MOOD DISTURBANCE, OR ANXIETY (HCC): Primary | Chronic | ICD-10-CM

## 2025-04-14 DIAGNOSIS — G40.909 SEIZURE DISORDER (HCC): Chronic | ICD-10-CM

## 2025-04-14 PROBLEM — G40.901 STATUS EPILEPTICUS (HCC): Status: RESOLVED | Noted: 2024-12-19 | Resolved: 2025-04-14

## 2025-04-14 PROBLEM — B35.1 ONYCHOMYCOSIS: Status: RESOLVED | Noted: 2024-09-25 | Resolved: 2025-04-14

## 2025-04-14 PROBLEM — G40.919 BREAKTHROUGH SEIZURE (HCC): Status: RESOLVED | Noted: 2024-12-19 | Resolved: 2025-04-14

## 2025-04-14 PROBLEM — E43 SEVERE PROTEIN-CALORIE MALNUTRITION: Status: RESOLVED | Noted: 2024-12-20 | Resolved: 2025-04-14

## 2025-04-14 PROBLEM — R00.1 SYMPTOMATIC BRADYCARDIA: Status: RESOLVED | Noted: 2024-07-19 | Resolved: 2025-04-14

## 2025-04-14 PROBLEM — R13.10 DYSPHAGIA: Chronic | Status: RESOLVED | Noted: 2023-03-23 | Resolved: 2025-04-14

## 2025-04-14 PROBLEM — K21.9 GASTROESOPHAGEAL REFLUX DISEASE WITHOUT ESOPHAGITIS: Chronic | Status: RESOLVED | Noted: 2023-03-23 | Resolved: 2025-04-14

## 2025-04-14 PROCEDURE — 3074F SYST BP LT 130 MM HG: CPT | Performed by: FAMILY MEDICINE

## 2025-04-14 PROCEDURE — 1159F MED LIST DOCD IN RCRD: CPT | Performed by: FAMILY MEDICINE

## 2025-04-14 PROCEDURE — 1123F ACP DISCUSS/DSCN MKR DOCD: CPT | Performed by: FAMILY MEDICINE

## 2025-04-14 PROCEDURE — 3078F DIAST BP <80 MM HG: CPT | Performed by: FAMILY MEDICINE

## 2025-04-14 PROCEDURE — 99350 HOME/RES VST EST HIGH MDM 60: CPT | Performed by: FAMILY MEDICINE

## 2025-04-14 PROCEDURE — 1160F RVW MEDS BY RX/DR IN RCRD: CPT | Performed by: FAMILY MEDICINE

## 2025-04-14 RX ORDER — FERROUS SULFATE 325(65) MG
1 TABLET ORAL EVERY OTHER DAY
Qty: 90 TABLET | Refills: 1 | Status: SHIPPED | OUTPATIENT
Start: 2025-04-14

## 2025-04-14 NOTE — PROGRESS NOTES
[facility]      Primary Care At Home - Home Visit      Name: Nehemias Burleson Jr.    [place] [address] [place]    :  1945  MRN:  [id number]      ASSESSMENT/PLAN:    Assessment & Plan  Severe vascular dementia without behavioral disturbance, psychotic disturbance, mood disturbance, or anxiety (HCC)     Progressive. He is a FAST score 7 with a life expectancy of 12-18 months. He is still taking po as offered. No choking events and I encouraged his wife to be judicious with the food offerings and liquids.         Essential hypertension     Excellent and at goal. Would continue current medications       Mixed hyperlipidemia     Discussed the diminished value of a statin with such a low life expectancy. We will probably discontinue at the next visit to reduce pill burden.       History of stroke     He continues on aspirin and he has excellent BP control.       Seizure disorder ([facility])     Continue keppra and depakote. He has had no seizure activity.       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         SUBJECTIVE:    Chief Complaint:  Chief Complaint   Patient presents with    Follow-up     Follow up for seizure, dementia and hypertension       History of Present Illness:    Nehemias Burleson Jr. is a 79 y.o. male who is seen for an [facility] Primary Care At Home Home Visit. It would be physically and emotionally difficult to take the patient from the home to seek primary care services

## 2025-05-27 RX ORDER — ATORVASTATIN CALCIUM 40 MG/1
40 TABLET, FILM COATED ORAL DAILY
Qty: 90 TABLET | Refills: 1 | Status: SHIPPED | OUTPATIENT
Start: 2025-05-27

## 2025-05-27 NOTE — TELEPHONE ENCOUNTER
Telephone call from wife Lillian wondering why folic acid was canceled at the pharmacy.  Referred to handwritten AVS left in the home by Dr. Michelle at time of last visit.  Reviewed that folic acid is a vitamin and that the doctor has spoken with Lillian about decreasing pts pill burden and that vitamins may be discontinued when they run out.  Lillian has 5 more folic acid tablets on hand, instructed to stop that med when she runs out.  Also discussed that she can stop B12 when she runs out of that medication.  Lillian states she has already stopped the pantoprazole and is tapering him down on isosorbide as the doctor instructed.  Advised Lillian to refer to the written instruction on this AVS that she has in the home.

## 2025-06-06 RX ORDER — ATORVASTATIN CALCIUM 40 MG/1
40 TABLET, FILM COATED ORAL DAILY
Qty: 90 TABLET | Refills: 1 | OUTPATIENT
Start: 2025-06-06

## 2025-06-10 ENCOUNTER — TELEPHONE (OUTPATIENT)
Facility: CLINIC | Age: 80
End: 2025-06-10

## 2025-06-17 ENCOUNTER — OFFICE VISIT (OUTPATIENT)
Facility: CLINIC | Age: 80
End: 2025-06-17
Payer: MEDICARE

## 2025-06-17 VITALS — OXYGEN SATURATION: 100 % | DIASTOLIC BLOOD PRESSURE: 77 MMHG | SYSTOLIC BLOOD PRESSURE: 130 MMHG | HEART RATE: 68 BPM

## 2025-06-17 DIAGNOSIS — I10 ESSENTIAL HYPERTENSION: Chronic | ICD-10-CM

## 2025-06-17 DIAGNOSIS — G40.909 SEIZURE DISORDER (HCC): Chronic | ICD-10-CM

## 2025-06-17 DIAGNOSIS — G25.3 MYOCLONIC JERKING: ICD-10-CM

## 2025-06-17 DIAGNOSIS — L98.421 SKIN ULCER OF SACRUM, LIMITED TO BREAKDOWN OF SKIN (HCC): ICD-10-CM

## 2025-06-17 DIAGNOSIS — F01.C0 SEVERE VASCULAR DEMENTIA WITHOUT BEHAVIORAL DISTURBANCE, PSYCHOTIC DISTURBANCE, MOOD DISTURBANCE, OR ANXIETY (HCC): Primary | Chronic | ICD-10-CM

## 2025-06-17 PROCEDURE — 3075F SYST BP GE 130 - 139MM HG: CPT | Performed by: FAMILY MEDICINE

## 2025-06-17 PROCEDURE — 1123F ACP DISCUSS/DSCN MKR DOCD: CPT | Performed by: FAMILY MEDICINE

## 2025-06-17 PROCEDURE — 1160F RVW MEDS BY RX/DR IN RCRD: CPT | Performed by: FAMILY MEDICINE

## 2025-06-17 PROCEDURE — 3078F DIAST BP <80 MM HG: CPT | Performed by: FAMILY MEDICINE

## 2025-06-17 PROCEDURE — 99349 HOME/RES VST EST MOD MDM 40: CPT | Performed by: FAMILY MEDICINE

## 2025-06-17 PROCEDURE — 1159F MED LIST DOCD IN RCRD: CPT | Performed by: FAMILY MEDICINE

## 2025-06-17 NOTE — PROGRESS NOTES
[facility]      Primary Care At Home - Home Visit      Name: Nehemias Burleson Jr.    Address: [address] [facility]    :  1945  MRN:  [id number]    ASSESSMENT/PLAN:  Assessment & Plan  Severe vascular dementia without behavioral disturbance, psychotic disturbance, mood disturbance, or anxiety (HCC)     He is having more difficulty with swallowing, managing secretions  We discussed risk of aspiration and so many of his medications will be discontinued. It is nearing the time that hospice should be consulted.     Essential hypertension     AT goal, so for now we will continue his BP medications       Seizure disorder (HCC)     Continue anti-epileptics     Myoclonic jerking     Reassurance     Skin ulcer of sacrum, limited to breakdown of skin (HCC)     Continue wound care      SUBJECTIVE:    Chief Complaint:  Chief Complaint   Patient presents with    Follow-up     Continues to decline       History of Present Illness:    Nehemias Burleson Jr. is a 79 y.o. male who is seen for an [facility] Primary Care At Home Home Visit. It would be physically and emotionally difficult to take the patient from the home to seek primary care services in the community. The patient is homebound as a result of very limited mobility due to multiple medical problems including severe, end-stage dementia. Wife has noted that secretions are pooling. She has also noted that he is having a bit more coughing when she feeds him, although she believes he still enjoys eating.  His myoclonic jerking is stable, but remains a concern of his wife and she thinks these represent a facet of his seizure disorder.  The patient has a number of chronic medical problems as listed above.  Wife is very good about multiple position changes.  The history is obtained from the patient, family and previous records and is felt to be satisfactory to establish an acceptable plan of care. Health status indicators were reviewed

## 2025-06-17 NOTE — ASSESSMENT & PLAN NOTE
He is having more difficulty with swallowing, managing secretions  We discussed risk of aspiration and so many of his medications will be discontinued. It is nearing the time that hospice should be consulted.